# Patient Record
Sex: FEMALE | Race: WHITE | Employment: STUDENT | URBAN - METROPOLITAN AREA
[De-identification: names, ages, dates, MRNs, and addresses within clinical notes are randomized per-mention and may not be internally consistent; named-entity substitution may affect disease eponyms.]

---
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
## 2021-02-09 ENCOUNTER — HOSPITAL ENCOUNTER (OUTPATIENT)
Dept: LAB | Age: 21
Discharge: HOME | End: 2021-02-09
Payer: COMMERCIAL

## 2021-02-09 LAB — HGB BLD-MCNC: 9.5 G/DL (ref 11.7–15.4)

## 2021-02-09 PROCEDURE — 36415 COLL VENOUS BLD VENIPUNCTURE: CPT

## 2021-02-09 PROCEDURE — 85018 HEMOGLOBIN: CPT

## 2021-02-10 ENCOUNTER — ANESTHESIA EVENT (OUTPATIENT)
Dept: SURGERY | Age: 21
End: 2021-02-10
Payer: COMMERCIAL

## 2021-02-11 ENCOUNTER — ANESTHESIA (OUTPATIENT)
Dept: SURGERY | Age: 21
End: 2021-02-11
Payer: COMMERCIAL

## 2021-02-11 ENCOUNTER — HOSPITAL ENCOUNTER (OUTPATIENT)
Age: 21
Setting detail: OUTPATIENT SURGERY
Discharge: HOME | End: 2021-02-11
Attending: ORTHOPAEDIC SURGERY | Admitting: ORTHOPAEDIC SURGERY
Payer: COMMERCIAL

## 2021-02-11 ENCOUNTER — APPOINTMENT (OUTPATIENT)
Dept: GENERAL RADIOLOGY | Age: 21
End: 2021-02-11
Attending: ORTHOPAEDIC SURGERY
Payer: COMMERCIAL

## 2021-02-11 VITALS
HEART RATE: 89 BPM | RESPIRATION RATE: 20 BRPM | OXYGEN SATURATION: 99 % | SYSTOLIC BLOOD PRESSURE: 112 MMHG | TEMPERATURE: 97.6 F | WEIGHT: 125 LBS | DIASTOLIC BLOOD PRESSURE: 60 MMHG | BODY MASS INDEX: 19.58 KG/M2

## 2021-02-11 DIAGNOSIS — T85.848D PAIN FROM IMPLANTED HARDWARE, SUBSEQUENT ENCOUNTER: Primary | ICD-10-CM

## 2021-02-11 LAB — HCG UR QL: NEGATIVE

## 2021-02-11 PROCEDURE — 77030002933 HC SUT MCRYL J&J -A: Performed by: ORTHOPAEDIC SURGERY

## 2021-02-11 PROCEDURE — 77030008984 HC BN CANC CHP MUSC -E: Performed by: ORTHOPAEDIC SURGERY

## 2021-02-11 PROCEDURE — 76010010054 HC POST OP PAIN BLOCK: Performed by: ORTHOPAEDIC SURGERY

## 2021-02-11 PROCEDURE — 74011000250 HC RX REV CODE- 250: Performed by: ANESTHESIOLOGY

## 2021-02-11 PROCEDURE — 76010000161 HC OR TIME 1 TO 1.5 HR INTENSV-TIER 1: Performed by: ORTHOPAEDIC SURGERY

## 2021-02-11 PROCEDURE — 2709999900 HC NON-CHARGEABLE SUPPLY: Performed by: ORTHOPAEDIC SURGERY

## 2021-02-11 PROCEDURE — 74011250636 HC RX REV CODE- 250/636: Performed by: ANESTHESIOLOGY

## 2021-02-11 PROCEDURE — 77030003602 HC NDL NRV BLK BBMI -B: Performed by: ANESTHESIOLOGY

## 2021-02-11 PROCEDURE — 74011000250 HC RX REV CODE- 250: Performed by: NURSE ANESTHETIST, CERTIFIED REGISTERED

## 2021-02-11 PROCEDURE — 76060000033 HC ANESTHESIA 1 TO 1.5 HR: Performed by: ORTHOPAEDIC SURGERY

## 2021-02-11 PROCEDURE — 74011250636 HC RX REV CODE- 250/636: Performed by: ORTHOPAEDIC SURGERY

## 2021-02-11 PROCEDURE — 76210000006 HC OR PH I REC 0.5 TO 1 HR: Performed by: ORTHOPAEDIC SURGERY

## 2021-02-11 PROCEDURE — 76210000020 HC REC RM PH II FIRST 0.5 HR: Performed by: ORTHOPAEDIC SURGERY

## 2021-02-11 PROCEDURE — 74011250637 HC RX REV CODE- 250/637: Performed by: ANESTHESIOLOGY

## 2021-02-11 PROCEDURE — 76942 ECHO GUIDE FOR BIOPSY: CPT | Performed by: ORTHOPAEDIC SURGERY

## 2021-02-11 PROCEDURE — 77030038911 HC SYS ANGEL BN MARRW ARTH -G1: Performed by: ORTHOPAEDIC SURGERY

## 2021-02-11 PROCEDURE — 74011250636 HC RX REV CODE- 250/636: Performed by: NURSE ANESTHETIST, CERTIFIED REGISTERED

## 2021-02-11 PROCEDURE — 77030000032 HC CUF TRNQT ZIMM -B: Performed by: ORTHOPAEDIC SURGERY

## 2021-02-11 PROCEDURE — 81025 URINE PREGNANCY TEST: CPT

## 2021-02-11 DEVICE — GRAFT BNE SUB 30CC 1.7-10MM CANC CHIP MORSELIZED FRZ DRY: Type: IMPLANTABLE DEVICE | Site: FOOT | Status: FUNCTIONAL

## 2021-02-11 RX ORDER — PROPOFOL 10 MG/ML
INJECTION, EMULSION INTRAVENOUS AS NEEDED
Status: DISCONTINUED | OUTPATIENT
Start: 2021-02-11 | End: 2021-02-11 | Stop reason: HOSPADM

## 2021-02-11 RX ORDER — FENTANYL CITRATE 50 UG/ML
100 INJECTION, SOLUTION INTRAMUSCULAR; INTRAVENOUS ONCE
Status: COMPLETED | OUTPATIENT
Start: 2021-02-11 | End: 2021-02-11

## 2021-02-11 RX ORDER — SODIUM CHLORIDE 0.9 % (FLUSH) 0.9 %
5-40 SYRINGE (ML) INJECTION EVERY 8 HOURS
Status: DISCONTINUED | OUTPATIENT
Start: 2021-02-11 | End: 2021-02-11 | Stop reason: HOSPADM

## 2021-02-11 RX ORDER — PROPOFOL 10 MG/ML
INJECTION, EMULSION INTRAVENOUS
Status: DISCONTINUED | OUTPATIENT
Start: 2021-02-11 | End: 2021-02-11 | Stop reason: HOSPADM

## 2021-02-11 RX ORDER — DEXAMETHASONE SODIUM PHOSPHATE 4 MG/ML
INJECTION, SOLUTION INTRA-ARTICULAR; INTRALESIONAL; INTRAMUSCULAR; INTRAVENOUS; SOFT TISSUE
Status: COMPLETED | OUTPATIENT
Start: 2021-02-11 | End: 2021-02-11

## 2021-02-11 RX ORDER — HEPARIN SODIUM 1000 [USP'U]/ML
1000 INJECTION, SOLUTION INTRAVENOUS; SUBCUTANEOUS ONCE
Status: DISCONTINUED | OUTPATIENT
Start: 2021-02-11 | End: 2021-02-11 | Stop reason: HOSPADM

## 2021-02-11 RX ORDER — SODIUM CHLORIDE 9 MG/ML
50 INJECTION, SOLUTION INTRAVENOUS CONTINUOUS
Status: DISCONTINUED | OUTPATIENT
Start: 2021-02-11 | End: 2021-02-11 | Stop reason: HOSPADM

## 2021-02-11 RX ORDER — LIDOCAINE HYDROCHLORIDE 10 MG/ML
0.1 INJECTION INFILTRATION; PERINEURAL AS NEEDED
Status: DISCONTINUED | OUTPATIENT
Start: 2021-02-11 | End: 2021-02-11 | Stop reason: HOSPADM

## 2021-02-11 RX ORDER — ACETAMINOPHEN 500 MG
1000 TABLET ORAL ONCE
Status: COMPLETED | OUTPATIENT
Start: 2021-02-11 | End: 2021-02-11

## 2021-02-11 RX ORDER — LIDOCAINE HYDROCHLORIDE 20 MG/ML
INJECTION, SOLUTION EPIDURAL; INFILTRATION; INTRACAUDAL; PERINEURAL AS NEEDED
Status: DISCONTINUED | OUTPATIENT
Start: 2021-02-11 | End: 2021-02-11 | Stop reason: HOSPADM

## 2021-02-11 RX ORDER — SODIUM CHLORIDE, SODIUM LACTATE, POTASSIUM CHLORIDE, CALCIUM CHLORIDE 600; 310; 30; 20 MG/100ML; MG/100ML; MG/100ML; MG/100ML
150 INJECTION, SOLUTION INTRAVENOUS CONTINUOUS
Status: DISCONTINUED | OUTPATIENT
Start: 2021-02-11 | End: 2021-02-11 | Stop reason: HOSPADM

## 2021-02-11 RX ORDER — HYDROMORPHONE HYDROCHLORIDE 2 MG/ML
0.5 INJECTION, SOLUTION INTRAMUSCULAR; INTRAVENOUS; SUBCUTANEOUS
Status: DISCONTINUED | OUTPATIENT
Start: 2021-02-11 | End: 2021-02-11 | Stop reason: HOSPADM

## 2021-02-11 RX ORDER — ACETAMINOPHEN 500 MG
1000 TABLET ORAL
Status: DISCONTINUED | OUTPATIENT
Start: 2021-02-11 | End: 2021-02-11 | Stop reason: HOSPADM

## 2021-02-11 RX ORDER — MIDAZOLAM HYDROCHLORIDE 1 MG/ML
2 INJECTION, SOLUTION INTRAMUSCULAR; INTRAVENOUS
Status: COMPLETED | OUTPATIENT
Start: 2021-02-11 | End: 2021-02-11

## 2021-02-11 RX ORDER — CEFAZOLIN SODIUM/WATER 2 G/20 ML
2 SYRINGE (ML) INTRAVENOUS ONCE
Status: COMPLETED | OUTPATIENT
Start: 2021-02-11 | End: 2021-02-11

## 2021-02-11 RX ORDER — HYDROCODONE BITARTRATE AND ACETAMINOPHEN 5; 325 MG/1; MG/1
1 TABLET ORAL AS NEEDED
Status: DISCONTINUED | OUTPATIENT
Start: 2021-02-11 | End: 2021-02-11 | Stop reason: HOSPADM

## 2021-02-11 RX ORDER — SODIUM CHLORIDE 0.9 % (FLUSH) 0.9 %
5-40 SYRINGE (ML) INJECTION AS NEEDED
Status: DISCONTINUED | OUTPATIENT
Start: 2021-02-11 | End: 2021-02-11 | Stop reason: HOSPADM

## 2021-02-11 RX ORDER — FAMOTIDINE 20 MG/1
20 TABLET, FILM COATED ORAL ONCE
Status: COMPLETED | OUTPATIENT
Start: 2021-02-11 | End: 2021-02-11

## 2021-02-11 RX ADMIN — PROPOFOL 180 MCG/KG/MIN: 10 INJECTION, EMULSION INTRAVENOUS at 10:57

## 2021-02-11 RX ADMIN — DEXAMETHASONE SODIUM PHOSPHATE 4 MG: 4 INJECTION, SOLUTION INTRAMUSCULAR; INTRAVENOUS at 08:52

## 2021-02-11 RX ADMIN — FAMOTIDINE 20 MG: 20 TABLET, FILM COATED ORAL at 08:46

## 2021-02-11 RX ADMIN — HYDROCODONE BITARTRATE AND ACETAMINOPHEN 1 TABLET: 5; 325 TABLET ORAL at 13:07

## 2021-02-11 RX ADMIN — SODIUM CHLORIDE, SODIUM LACTATE, POTASSIUM CHLORIDE, AND CALCIUM CHLORIDE 150 ML/HR: 600; 310; 30; 20 INJECTION, SOLUTION INTRAVENOUS at 08:30

## 2021-02-11 RX ADMIN — MIDAZOLAM 2 MG: 1 INJECTION INTRAMUSCULAR; INTRAVENOUS at 08:44

## 2021-02-11 RX ADMIN — ROPIVACAINE HYDROCHLORIDE 20 ML: 5 INJECTION, SOLUTION EPIDURAL; INFILTRATION; PERINEURAL at 08:52

## 2021-02-11 RX ADMIN — ACETAMINOPHEN 1000 MG: 500 TABLET, FILM COATED ORAL at 08:46

## 2021-02-11 RX ADMIN — DEXAMETHASONE SODIUM PHOSPHATE 4 MG: 4 INJECTION, SOLUTION INTRAMUSCULAR; INTRAVENOUS at 08:48

## 2021-02-11 RX ADMIN — LIDOCAINE HYDROCHLORIDE 40 MG: 20 INJECTION, SOLUTION EPIDURAL; INFILTRATION; INTRACAUDAL; PERINEURAL at 10:57

## 2021-02-11 RX ADMIN — PROPOFOL 50 MG: 10 INJECTION, EMULSION INTRAVENOUS at 10:57

## 2021-02-11 RX ADMIN — Medication 2 G: at 10:52

## 2021-02-11 RX ADMIN — FENTANYL CITRATE 100 MCG: 50 INJECTION, SOLUTION INTRAMUSCULAR; INTRAVENOUS at 08:44

## 2021-02-11 RX ADMIN — ROPIVACAINE HYDROCHLORIDE 30 ML: 5 INJECTION, SOLUTION EPIDURAL; INFILTRATION; PERINEURAL at 08:48

## 2021-02-11 NOTE — DISCHARGE INSTRUCTIONS
INSTRUCTIONS FOLLOWING FOOT SURGERY    ACTIVITY  Elevate foot (feet) for 48 hours. Use crutches as directed by your doctor. No weight bearing on operative foot. Aspirin 325 mg po every day Patient Education   Patient Education        Learning About FYCVI-80 and Social Distancing  What is it? Social distancing means putting space between yourself and other people. The recommended distance is 6 feet, or about 2 meters. This also means staying away from any place where people may gather, such as goins or other public gathering places. Why is it important? Social distancing is the best way to reduce the spread of COVID-19. This virus seems to spread from person to person through droplets from coughing and sneezing. So if you keep your distance from others, you're less likely to get it or spread it. And social distancing is important for everyone, not just those who are at high risk of infection, like older people. You might have the virus but not have symptoms. You could then give the infection to someone you come into contact with. How is it done? Putting 6 feet, or about 2 meters, between you and other people is the recommended distance. Also stay away from any place where people may gather, such as goins or other public gathering places. So if possible:  · Work from home, and keep your kids at home. · Don't travel if you don't have to. And avoid public transportation, ride-shares, and taxis unless you have no choice. · Limit shopping to essentials, like food and medicines. · Wear a cloth face cover if you have to go to a public place like the grocery store or pharmacy. · Don't eat in restaurants. (You can still get takeout or food deliveries.)  · Avoid crowds and busy places. Follow stay-at-home orders or other directions for your area. Where can you learn more?   Go to http://www.gray.com/  Enter A133 in the search box to learn more about \"Learning About COVID-19 and Social Distancing. \"  Current as of: December 18, 2020               Content Version: 12.7  © 2006-2021 [employer]. Care instructions adapted under license by [employer] (which disclaims liability or warranty for this information). If you have questions about a medical condition or this instruction, always ask your healthcare professional. [place] [address] any warranty or liability for your use of this information. Learning About How to Use Crutches  Your Care Instructions  Crutches can help you walk when you have an injured hip, leg, knee, ankle, or foot. Your doctor will tell you how much weight--if any--you can put on your leg. Be sure your crutches fit you. When you stand up in your normal posture, there should be space for two or three fingers between the top of the crutch and your armpit. When you let your hands hang down, the hand  should be at your wrists. When you put your hands on the hand , your elbows should be slightly bent. To stay safe when using crutches:  · Look straight ahead, not down at your feet. · Clear away small rugs, cords, or anything else that could cause you to trip, slip, or fall. · Be very careful around pets and small children. They can get in your path when you least expect it. · Be sure the rubber tips on your crutches are clean and in good condition to help prevent slipping. · Avoid slick conditions, such as wet floors and snowy or icy driveways. In bad weather, be especially careful on curbs and steps. How to use crutches  Getting ready to walk   1. Bend your elbows slightly. Press the padded top parts of the crutches against your sides, under your armpits. 2. If you have been told not to put any weight on your injured leg, keep that leg bent and off the ground. How to walk with crutches when you can put weight on the injured leg   Crutches allow you to take all the weight off of one leg.  They can also be used as an added support if you have some injury or condition of both legs. Here's how to walk with crutches when you're able to put weight on your weak or injured leg. Be sure your crutches fit you. · When you stand up in your normal posture, there should be space for two or three fingers between the top of the crutch and your underarm. · When you let your hands hang down, the hand  should be at your wrists. · When you put your hands on the hand , your elbows should be slightly bent. 1. Put both crutches about 12 inches in front of you. 2. Put your weight on the handgrips, not on the pads under your arms. 3. Move your weak or injured leg forward so it's almost even with the crutches. 4. Bring your good leg up, so it's even with your weak or injured leg. 5. Move your crutches about 12 inches in front of you, and start the next step. The crutches and your feet should form a triangle. Hold the crutches close enough to your body so you can push straight down on them, but leave room between the crutches for your body to pass through. Don't lean forward to reach farther. Constant pressure against your underarms can cause numbness. When you're confident using the crutches, you can move the crutches and your injured leg at the same time. Then push straight down on the crutches as you step past the crutches with your strong leg, as you would in normal walking. Take small steps. Use ramps and elevators when you can. Sitting down   1. To sit, back up to the chair. Use one hand to hold both crutches by the handgrips, beside your injured leg. With the other hand, hold onto the seat and slowly lower yourself onto the chair. 2. Lay the crutches on the ground near your chair. If you prop them up, they may fall over. Getting up from a chair   1. To get up from a chair,  the crutches and put them in one hand beside your injured leg.   2. Put your weight on the handgrips of the crutches and on your strong leg to stand up. How to go up and down stairs using crutches   Here's how to go up and down stairs using crutches. Try this first with another person nearby to steady you if needed. 1. Stand near the edge of the stairs. 2. Go up or down the stairs. · If you are going up, step up with your stronger leg. Then bring the crutches and your weak or injured leg to the upper step. · If you are going down, put your crutches and your weak or injured leg on the lower step. Then bring your stronger leg down to the lower step. Remember \"up with the good, and down with the bad\" to help you lead with the correct leg. Crutches: How to go up and down stairs with handrails   If the stairs have a good sturdy handrail, you can hold it with one hand and your crutches together in the other hand. Here's how. Try this first with another person nearby to steady you if needed. If the stairs have a handrail but you don't think it's sturdy enough, use the crutches normally, holding one in each hand. 1. Stand near the edge of the stairs. 2. Put both crutches under the arm opposite the handrail. 3. Use the hand opposite the handrail to hold both crutches by the handgrips. 4. Hold onto the handrail as you go up or down. 5. Go up or down the stairs. · If you are going up, step up with your stronger leg. Then bring the crutches and your weak or injured leg to the upper step. · If you are going down, put your crutches and your weak or injured leg on the lower step. Then bring your stronger leg down to the lower step. Remember \"up with the good, down with the bad\" to help you lead with the correct leg. Follow-up care is a key part of your treatment and safety. Be sure to make and go to all appointments, and call your doctor if you are having problems. It's also a good idea to know your test results and keep a list of the medicines you take. Where can you learn more?   Go to http://www.gray.com/  Enter H378 in the search box to learn more about \"Learning About How to Use Crutches. \"  Current as of: March 2, 2020               Content Version: 12.6  © 6978-3458 "[employer]. Care instructions adapted under license by [employer] (which disclaims liability or warranty for this information). If you have questions about a medical condition or this instruction, always ask your healthcare professional. [place] [address] any warranty or liability for your use of this information. DIET  Clear liquids until no nausea or vomiting; then light diet for the first day. Advance to regular diet on second day, unless your doctor orders otherwise. PAIN  Take pain medications as directed by your doctor. Call your doctor if pain is NOT relieved by medication. DO NOT take aspirin or blood thinners until directed by your doctor. DRESSING CARE      FOLLOW-UP PHONE CALLS  Calls will be made by nursing staff. If you have any problems, call your doctor as needed. CALL YOUR DOCTOR IF YOU HAVE  Excessive bleeding that does not stop after holding mild pressure over the area. Temperature of 101 degrees or above. Redness, excessive swelling or bruising, and/or green or yellow, smelly discharge from incision. Loss of sensation - cold, white or blue toes. AFTER ANESTHESIA  For the first 24 hours and while taking narcotics for pain: DO NOT Drive, Drink Alcoholic beverages, or make important Decisions. Be aware of dizziness following anesthesia and while taking pain medication.     OTHER INSTRUCTIONS    APPOINTMENT DATE/TIME_________________________________    Ibeth Sosa DOCTOR'S PHONE NUMBER__________________________

## 2021-02-11 NOTE — BRIEF OP NOTE
Brief Postoperative Note    Patient: Christie Phoenix  YOB: 2000  MRN: [id number]    Date of Procedure: 2/11/2021     Pre-Op Diagnosis: Other mechanical complication of internal orthopedic device, unspecified orthopedic device type, initial encounter [facility]) [D26.096S]      Post-Op Diagnosis: Same as preoperative diagnosis. Procedure(s):  RIGHT FOOT HARDWARE REMOVAL/ PERONEAL TENOSYNOVECTOMY/ CALCANEAL BONE MARROW ASPIRATE CHOICE ANES    Surgeon(s):  Dani Asher MD    Surgical Assistant: None    Anesthesia: General     Estimated Blood Loss (mL): Minimal    Complications: None    Specimens: * No specimens in log *     Implants:   Implant Name Type Inv.  Item Serial No.  Lot No. LRB No. Used Action   BONE CHP CANC FD 1.7-10MM 30ML --  - [id number]  BONE CHP CANC FD 1.7-10MM 30ML --  [id number] [employer]  Right 1 Implanted       Drains: * No LDAs found *    Findings:     Electronically Signed by Johanny Pena MD on 2/11/2021 at 2:20 PM

## 2021-02-11 NOTE — ANESTHESIA PROCEDURE NOTES
Peripheral Block    Start time: 2/11/2021 8:44 AM  End time: 2/11/2021 8:48 AM  Performed by: Los Espinal MD  Authorized by: Los Espinal MD       Pre-procedure: Indications: at surgeon's request and post-op pain management    Preanesthetic Checklist: patient identified, risks and benefits discussed, site marked, timeout performed, anesthesia consent given and patient being monitored    Timeout Time: 08:44          Block Type:   Block Type:  Popliteal  Laterality:  Right  Monitoring:  Standard ASA monitoring, responsive to questions, oxygen, continuous pulse ox, frequent vital sign checks and heart rate  Injection Technique:  Single shot  Procedures: ultrasound guided and nerve stimulator    Patient Position: left lateral decubitus (lateral decubitus)  Prep: chlorhexidine    Location:  Mid thigh  Needle Type:  Stimuplex  Needle Gauge:  22 G  Needle Localization:  Nerve stimulator and ultrasound guidance  Motor Response comment:   Motor Response: minimal motor response >0.4 mA   Medication Injected:  Ropivacaine 0.375% with epi 1:200,000 in NS injection, 30 mL (Mixture components: ropivacaine (PF) 5 mg/mL (0.5 %) Soln, . 75 mL; EPINEPHrine HCl (PF) 1 mg/mL (1 mL) Soln, . 005 mL; 0.9% sodium chloride Soln, .245 mL)  dexamethasone (DECADRON) 4 mg/mL injection, 4 mg  Med Admin Time: 2/11/2021 8:48 AM    Assessment:  Number of attempts:  1  Injection Assessment:  Incremental injection every 5 mL, local visualized surrounding nerve on ultrasound, negative aspiration for blood, no intravascular symptoms, no paresthesia and ultrasound image on chart  Patient tolerance:  Patient tolerated the procedure well with no immediate complications  All needles out intact, proc. Tolerated well.

## 2021-02-11 NOTE — ANESTHESIA PROCEDURE NOTES
Peripheral Block    Start time: 2/11/2021 8:49 AM  End time: 2/11/2021 8:52 AM  Performed by: Danna Hsu MD  Authorized by: Danna Hsu MD       Pre-procedure: Indications: at surgeon's request and post-op pain management    Preanesthetic Checklist: patient identified, risks and benefits discussed, site marked, timeout performed, anesthesia consent given and patient being monitored    Timeout Time: 08:49          Block Type:   Block Type: Adductor canal  Laterality:  Right  Monitoring:  Standard ASA monitoring, responsive to questions, oxygen, continuous pulse ox, frequent vital sign checks and heart rate  Injection Technique:  Single shot  Procedures: ultrasound guided    Patient Position: supine  Prep: chlorhexidine    Location:  Mid thigh  Needle Type:  Stimuplex  Needle Gauge:  22 G  Needle Localization:  Ultrasound guidance  Medication Injected:  Ropivacaine 0.5% with epinephrine 1:200,000 injection, 20 mL (Mixture components: EPINEPHrine HCl (PF) 1 mg/mL (1 mL) Soln, . 005 mL; ropivacaine (PF) 5 mg/mL (0.5 %) Soln, 1 mL)  dexamethasone (DECADRON) 4 mg/mL injection, 4 mg  Med Admin Time: 2/11/2021 8:52 AM    Assessment:  Number of attempts:  1  Injection Assessment:  Incremental injection every 5 mL, local visualized surrounding nerve on ultrasound, negative aspiration for blood, no intravascular symptoms, no paresthesia and ultrasound image on chart  Patient tolerance:  Patient tolerated the procedure well with no immediate complications  All needles out intact, proc. Tolerated well.

## 2021-02-11 NOTE — OP NOTES
FULL OP NOTE    PATIENT NAME: Kevin Jacob  MRN: [id number]    DATE OF SURGERY: 2/11/2021    PREOPERATIVE DIAGNOSIS: Other mechanical complication of internal orthopedic device, unspecified orthopedic device type, initial encounter [facility]) [V52.092B]        POSTOPERATIVE DIAGNOSIS: Other mechanical complication of internal orthopedic device, unspecified orthopedic device type, initial encounter ([facility] [address] [M21.091K]        PROCEDURE: 1. Right foot hardware removal, 20680                             2.  Right peroneal Tenolysis, 09727                             3.  Right sural nerve neurolysis, 97124                            2.  Right lateral calcaneal bone marrow autograft harvest, 20900      SURGEON: Dwayne Rinaldi MD    HARDWARE:   Implant Name Type Inv. Item Serial No.  Lot No. LRB No. Used Action   BONE CHP CANC FD 1.7-10MM 30ML --  - [id number]  BONE CHP CANC FD 1.7-10MM 30ML --  [id number] [employer]_  Right 1 Implanted     INDICATIONS: This patient is a 21y.o. year old female with a history of Other mechanical complication of internal orthopedic device, unspecified orthopedic device type, initial encounter ([facility] [address] [H24.850R] with a history of an Jane lateral column lengthening procedure done who has pain over peroneal tendons and sural neuritis. Additionally she has some impingement of the wedge and her subtalar joint laterally causing sinus Tarsi pain   who has failed conservative therapy and desires surgical treatment. Risks and benefits of the procedure including, but not limited to, anesthetic complications as well as surgical complications including damage to nerves and blood vessels, risk of infection, risk of incomplete pain relief, risk of malunion, nonunion and need for additional surgery have been discussed with the patient who wishes to proceed. PROCEDURE IN DETAIL: A time out was done to confirm the operating procedure, surgeon, patient and site. A block was placed by the department of anesthesia. In a preop surgical timeout the right lower extremity was then a 5 tract surgical site and prepped and draped in a standard sterile fashion using 70% alcohol solution. A large bore needle was placed lateral calcaneal wall where bone marrow aspirate was obtained. This was later spun in a centrifuge and mixed with allograft and placed in the defect from the hardware removal.  Patient's previous lateral approach to the calcaneus was an open. The sural nerve was identified. A neurolysis of this was performed at that time to free the sural nerve from scar tissue and the nerve was carefully protected out the procedure. Both peroneal tendons were identified and a tenolysis of both peroneal tendons was also performed that time to mobilize them from scar tissue in the area. The underlying Jane metal wedge was identified. An osteotome was used on both sides a wedge to remove the wedge in its entirety. Disc rated a large defect in the calcaneus as was anticipated. This was packed with allograft chips soaked in the patient's bone marrow aspirate and confirmed to be adequately placed on image intensification. Wound was irrigated and closed using Monocryl nylon sutures. A sterile dressings and applied followed by well-padded posterior splint. Anesthesia was discontinued. Patient was transfer better recovery bed. She was taken recovery in satisfactory condition. She appeared to tolerate procedure well. There were no apparent or lower anesthetic complications. All needle and sponge counts are correct. Postop should be nonweightbearing in splint taking aspirin 325 mg daily for DVT prophylaxis. TOURNIQUET TIME: Approx 54 minutes. SPECIMENS: none    ESTIMATED BLOOD LOSS: min mL.

## 2021-02-11 NOTE — ANESTHESIA PREPROCEDURE EVALUATION
Anesthetic History     PONV          Review of Systems / Medical History  Patient summary reviewed and pertinent labs reviewed    Pulmonary  Within defined limits                 Neuro/Psych         Psychiatric history (Depression)     Cardiovascular                  Exercise tolerance: >4 METS  Comments: Denies CP, SOB or changes in functional status   GI/Hepatic/Renal  Within defined limits              Endo/Other  Within defined limits           Other Findings   Comments: CRPS LE           Physical Exam    Airway  Mallampati: II  TM Distance: 4 - 6 cm  Neck ROM: normal range of motion   Mouth opening: Normal     Cardiovascular    Rhythm: regular  Rate: normal         Dental  No notable dental hx       Pulmonary  Breath sounds clear to auscultation               Abdominal  GI exam deferred       Other Findings            Anesthetic Plan    ASA: 2  Anesthesia type: total IV anesthesia and general - backup      Post-op pain plan if not by surgeon: peripheral nerve block single    Induction: Intravenous  Anesthetic plan and risks discussed with: Patient and Spouse

## 2021-02-11 NOTE — ANESTHESIA POSTPROCEDURE EVALUATION
Procedure(s):  RIGHT FOOT HARDWARE REMOVAL/ PERONEAL TENOSYNOVECTOMY/ CALCANEAL BONE MARROW ASPIRATE CHOICE ANES. total IV anesthesia, general - backup    Anesthesia Post Evaluation      Multimodal analgesia: multimodal analgesia used between 6 hours prior to anesthesia start to PACU discharge  Patient location during evaluation: bedside  Patient participation: complete - patient participated  Level of consciousness: awake and alert  Pain management: adequate  Airway patency: patent  Anesthetic complications: no  Cardiovascular status: hemodynamically stable  Respiratory status: spontaneous ventilation  Hydration status: euvolemic  Comments: Patient stable and may discharge at this time. Post anesthesia nausea and vomiting:  none  Final Post Anesthesia Temperature Assessment:  Normothermia (36.0-37.5 degrees C)    Good result with popliteal and adductor canal blocks.     INITIAL Post-op Vital signs:   Vitals Value Taken Time   /63 02/11/21 1253   Temp 36.4 °C (97.6 °F) 02/11/21 1253   Pulse 83 02/11/21 1253   Resp 16 02/11/21 1253   SpO2 100 % 02/11/21 1253

## 2021-02-19 RX ORDER — OXYCODONE HYDROCHLORIDE 5 MG/1
10 TABLET ORAL
Qty: 30 TAB | Refills: 0 | Status: SHIPPED | OUTPATIENT
Start: 2021-02-19 | End: 2021-02-22

## 2021-05-13 ENCOUNTER — HOSPITAL ENCOUNTER (OUTPATIENT)
Dept: CT IMAGING | Age: 21
Discharge: HOME | End: 2021-05-13
Attending: ORTHOPAEDIC SURGERY
Payer: COMMERCIAL

## 2021-05-13 DIAGNOSIS — T84.498A OTHER MECHANICAL COMPLICATION OF OTHER INTERNAL ORTHOPEDIC DEVICES, IMPLANTS AND GRAFTS, INITIAL ENCOUNTER (HCC): ICD-10-CM

## 2021-05-13 PROCEDURE — 73700 CT LOWER EXTREMITY W/O DYE: CPT

## 2021-05-17 ENCOUNTER — HOSPITAL ENCOUNTER (OUTPATIENT)
Dept: LAB | Age: 21
Discharge: HOME | End: 2021-05-17
Attending: ORTHOPAEDIC SURGERY
Payer: COMMERCIAL

## 2021-05-17 DIAGNOSIS — Z98.890 S/P FOOT SURGERY, LEFT: Chronic | ICD-10-CM

## 2021-05-17 LAB
BASOPHILS # BLD: 0.1 K/UL (ref 0–0.2)
BASOPHILS NFR BLD: 1 % (ref 0–2)
CRP SERPL-MCNC: <0.3 MG/DL (ref 0–0.9)
DIFFERENTIAL METHOD BLD: ABNORMAL
EOSINOPHIL # BLD: 0.1 K/UL (ref 0–0.8)
EOSINOPHIL NFR BLD: 1 % (ref 0.5–7.8)
ERYTHROCYTE [DISTWIDTH] IN BLOOD BY AUTOMATED COUNT: 15.5 % (ref 11.9–14.6)
ERYTHROCYTE [SEDIMENTATION RATE] IN BLOOD: 14 MM/HR (ref 0–20)
HCT VFR BLD AUTO: 32.2 % (ref 35.8–46.3)
HGB BLD-MCNC: 8.9 G/DL (ref 11.7–15.4)
IMM GRANULOCYTES # BLD AUTO: 0 K/UL (ref 0–0.5)
IMM GRANULOCYTES NFR BLD AUTO: 1 % (ref 0–5)
LYMPHOCYTES # BLD: 2 K/UL (ref 0.5–4.6)
LYMPHOCYTES NFR BLD: 23 % (ref 13–44)
MCH RBC QN AUTO: 20.1 PG (ref 26.1–32.9)
MCHC RBC AUTO-ENTMCNC: 27.6 G/DL (ref 31.4–35)
MCV RBC AUTO: 72.9 FL (ref 79.6–97.8)
MONOCYTES # BLD: 0.7 K/UL (ref 0.1–1.3)
MONOCYTES NFR BLD: 8 % (ref 4–12)
NEUTS SEG # BLD: 5.8 K/UL (ref 1.7–8.2)
NEUTS SEG NFR BLD: 66 % (ref 43–78)
NRBC # BLD: 0 K/UL (ref 0–0.2)
PLATELET # BLD AUTO: 303 K/UL (ref 150–450)
PMV BLD AUTO: 10 FL (ref 9.4–12.3)
RBC # BLD AUTO: 4.42 M/UL (ref 4.05–5.2)
WBC # BLD AUTO: 8.8 K/UL (ref 4.3–11.1)

## 2021-05-17 PROCEDURE — 86140 C-REACTIVE PROTEIN: CPT

## 2021-05-17 PROCEDURE — 36415 COLL VENOUS BLD VENIPUNCTURE: CPT

## 2021-05-17 PROCEDURE — 85025 COMPLETE CBC W/AUTO DIFF WBC: CPT

## 2021-05-17 PROCEDURE — 85652 RBC SED RATE AUTOMATED: CPT

## 2021-07-07 ENCOUNTER — ANESTHESIA EVENT (OUTPATIENT)
Dept: SURGERY | Age: 21
End: 2021-07-07
Payer: COMMERCIAL

## 2021-07-08 ENCOUNTER — HOSPITAL ENCOUNTER (OUTPATIENT)
Age: 21
Setting detail: OBSERVATION
LOS: 1 days | Discharge: HOME | End: 2021-07-11
Attending: ORTHOPAEDIC SURGERY | Admitting: ORTHOPAEDIC SURGERY
Payer: COMMERCIAL

## 2021-07-08 ENCOUNTER — APPOINTMENT (OUTPATIENT)
Dept: GENERAL RADIOLOGY | Age: 21
End: 2021-07-08
Attending: ORTHOPAEDIC SURGERY
Payer: COMMERCIAL

## 2021-07-08 ENCOUNTER — ANESTHESIA (OUTPATIENT)
Dept: SURGERY | Age: 21
End: 2021-07-08
Payer: COMMERCIAL

## 2021-07-08 DIAGNOSIS — M19.071 ARTHRITIS OF MIDTARSAL JOINT OF RIGHT FOOT: Primary | ICD-10-CM

## 2021-07-08 LAB — HCG UR QL: NEGATIVE

## 2021-07-08 PROCEDURE — 76010010054 HC POST OP PAIN BLOCK: Performed by: ORTHOPAEDIC SURGERY

## 2021-07-08 PROCEDURE — 28320 REPAIR OF FOOT BONES: CPT | Performed by: NURSE PRACTITIONER

## 2021-07-08 PROCEDURE — 74011250637 HC RX REV CODE- 250/637: Performed by: NURSE PRACTITIONER

## 2021-07-08 PROCEDURE — 99218 HC RM OBSERVATION: CPT

## 2021-07-08 PROCEDURE — 77030002933 HC SUT MCRYL J&J -A: Performed by: ORTHOPAEDIC SURGERY

## 2021-07-08 PROCEDURE — 74011250636 HC RX REV CODE- 250/636: Performed by: NURSE PRACTITIONER

## 2021-07-08 PROCEDURE — 77030025281 HC SPLNT ORTHGLS 1 BSNM -B: Performed by: ORTHOPAEDIC SURGERY

## 2021-07-08 PROCEDURE — 76010000161 HC OR TIME 1 TO 1.5 HR INTENSV-TIER 1: Performed by: ORTHOPAEDIC SURGERY

## 2021-07-08 PROCEDURE — 74011000250 HC RX REV CODE- 250: Performed by: NURSE ANESTHETIST, CERTIFIED REGISTERED

## 2021-07-08 PROCEDURE — 77030029041: Performed by: ORTHOPAEDIC SURGERY

## 2021-07-08 PROCEDURE — 74011250637 HC RX REV CODE- 250/637: Performed by: PHYSICIAN ASSISTANT

## 2021-07-08 PROCEDURE — 77030000032 HC CUF TRNQT ZIMM -B: Performed by: ORTHOPAEDIC SURGERY

## 2021-07-08 PROCEDURE — 74011250636 HC RX REV CODE- 250/636: Performed by: ANESTHESIOLOGY

## 2021-07-08 PROCEDURE — 28320 REPAIR OF FOOT BONES: CPT | Performed by: ORTHOPAEDIC SURGERY

## 2021-07-08 PROCEDURE — 76210000001 HC OR PH I REC 2.5 TO 3 HR: Performed by: ORTHOPAEDIC SURGERY

## 2021-07-08 PROCEDURE — 2709999900 HC NON-CHARGEABLE SUPPLY: Performed by: ORTHOPAEDIC SURGERY

## 2021-07-08 PROCEDURE — 28740 FUSION OF FOOT BONES: CPT | Performed by: NURSE PRACTITIONER

## 2021-07-08 PROCEDURE — 77030027138 HC INCENT SPIROMETER -A

## 2021-07-08 PROCEDURE — 77030038911 HC SYS ANGEL BN MARRW ARTH -G1: Performed by: ORTHOPAEDIC SURGERY

## 2021-07-08 PROCEDURE — 2709999900 HC NON-CHARGEABLE SUPPLY

## 2021-07-08 PROCEDURE — 76060000033 HC ANESTHESIA 1 TO 1.5 HR: Performed by: ORTHOPAEDIC SURGERY

## 2021-07-08 PROCEDURE — 74011250636 HC RX REV CODE- 250/636: Performed by: PHYSICIAN ASSISTANT

## 2021-07-08 PROCEDURE — 76942 ECHO GUIDE FOR BIOPSY: CPT | Performed by: ORTHOPAEDIC SURGERY

## 2021-07-08 PROCEDURE — 74011000250 HC RX REV CODE- 250: Performed by: ANESTHESIOLOGY

## 2021-07-08 PROCEDURE — 74011250636 HC RX REV CODE- 250/636: Performed by: NURSE ANESTHETIST, CERTIFIED REGISTERED

## 2021-07-08 PROCEDURE — 77030002916 HC SUT ETHLN J&J -A: Performed by: ORTHOPAEDIC SURGERY

## 2021-07-08 PROCEDURE — 74011250637 HC RX REV CODE- 250/637: Performed by: ANESTHESIOLOGY

## 2021-07-08 PROCEDURE — 20900 REMOVAL OF BONE FOR GRAFT: CPT | Performed by: ORTHOPAEDIC SURGERY

## 2021-07-08 PROCEDURE — 20900 REMOVAL OF BONE FOR GRAFT: CPT | Performed by: NURSE PRACTITIONER

## 2021-07-08 PROCEDURE — 81025 URINE PREGNANCY TEST: CPT

## 2021-07-08 PROCEDURE — 28740 FUSION OF FOOT BONES: CPT | Performed by: ORTHOPAEDIC SURGERY

## 2021-07-08 PROCEDURE — C1713 ANCHOR/SCREW BN/BN,TIS/BN: HCPCS | Performed by: ORTHOPAEDIC SURGERY

## 2021-07-08 PROCEDURE — 77030003602 HC NDL NRV BLK BBMI -B: Performed by: ANESTHESIOLOGY

## 2021-07-08 DEVICE — IMPLANTABLE DEVICE: Type: IMPLANTABLE DEVICE | Site: FOOT | Status: FUNCTIONAL

## 2021-07-08 RX ORDER — ENOXAPARIN SODIUM 100 MG/ML
40 INJECTION SUBCUTANEOUS EVERY 24 HOURS
Status: DISCONTINUED | OUTPATIENT
Start: 2021-07-08 | End: 2021-07-11 | Stop reason: HOSPADM

## 2021-07-08 RX ORDER — MIDAZOLAM HYDROCHLORIDE 1 MG/ML
2 INJECTION, SOLUTION INTRAMUSCULAR; INTRAVENOUS ONCE
Status: COMPLETED | OUTPATIENT
Start: 2021-07-08 | End: 2021-07-08

## 2021-07-08 RX ORDER — OXYCODONE HYDROCHLORIDE 5 MG/1
10 TABLET ORAL
Status: DISCONTINUED | OUTPATIENT
Start: 2021-07-08 | End: 2021-07-08

## 2021-07-08 RX ORDER — FAMOTIDINE 20 MG/1
20 TABLET, FILM COATED ORAL ONCE
Status: COMPLETED | OUTPATIENT
Start: 2021-07-08 | End: 2021-07-08

## 2021-07-08 RX ORDER — ACETAMINOPHEN 325 MG/1
650 TABLET ORAL
Status: DISCONTINUED | OUTPATIENT
Start: 2021-07-08 | End: 2021-07-11 | Stop reason: HOSPADM

## 2021-07-08 RX ORDER — SODIUM CHLORIDE 0.9 % (FLUSH) 0.9 %
5-40 SYRINGE (ML) INJECTION AS NEEDED
Status: DISCONTINUED | OUTPATIENT
Start: 2021-07-08 | End: 2021-07-11 | Stop reason: HOSPADM

## 2021-07-08 RX ORDER — LIDOCAINE HYDROCHLORIDE 10 MG/ML
0.1 INJECTION INFILTRATION; PERINEURAL AS NEEDED
Status: DISCONTINUED | OUTPATIENT
Start: 2021-07-08 | End: 2021-07-11 | Stop reason: HOSPADM

## 2021-07-08 RX ORDER — PROPOFOL 10 MG/ML
INJECTION, EMULSION INTRAVENOUS AS NEEDED
Status: DISCONTINUED | OUTPATIENT
Start: 2021-07-08 | End: 2021-07-08 | Stop reason: HOSPADM

## 2021-07-08 RX ORDER — CEFAZOLIN SODIUM/WATER 2 G/20 ML
2 SYRINGE (ML) INTRAVENOUS ONCE
Status: COMPLETED | OUTPATIENT
Start: 2021-07-08 | End: 2021-07-08

## 2021-07-08 RX ORDER — BUPIVACAINE HYDROCHLORIDE AND EPINEPHRINE 5; 5 MG/ML; UG/ML
INJECTION, SOLUTION EPIDURAL; INTRACAUDAL; PERINEURAL
Status: COMPLETED | OUTPATIENT
Start: 2021-07-08 | End: 2021-07-08

## 2021-07-08 RX ORDER — FENTANYL CITRATE 50 UG/ML
100 INJECTION, SOLUTION INTRAMUSCULAR; INTRAVENOUS ONCE
Status: COMPLETED | OUTPATIENT
Start: 2021-07-08 | End: 2021-07-08

## 2021-07-08 RX ORDER — OXYCODONE HYDROCHLORIDE 5 MG/1
5 TABLET ORAL
Status: DISCONTINUED | OUTPATIENT
Start: 2021-07-08 | End: 2021-07-08

## 2021-07-08 RX ORDER — VENLAFAXINE HYDROCHLORIDE 75 MG/1
225 CAPSULE, EXTENDED RELEASE ORAL DAILY
Status: DISCONTINUED | OUTPATIENT
Start: 2021-07-09 | End: 2021-07-11 | Stop reason: HOSPADM

## 2021-07-08 RX ORDER — HYDROMORPHONE HYDROCHLORIDE 1 MG/ML
0.5 INJECTION, SOLUTION INTRAMUSCULAR; INTRAVENOUS; SUBCUTANEOUS
Status: DISCONTINUED | OUTPATIENT
Start: 2021-07-08 | End: 2021-07-08

## 2021-07-08 RX ORDER — SODIUM CHLORIDE, SODIUM LACTATE, POTASSIUM CHLORIDE, CALCIUM CHLORIDE 600; 310; 30; 20 MG/100ML; MG/100ML; MG/100ML; MG/100ML
75 INJECTION, SOLUTION INTRAVENOUS CONTINUOUS
Status: DISPENSED | OUTPATIENT
Start: 2021-07-08 | End: 2021-07-09

## 2021-07-08 RX ORDER — SODIUM CHLORIDE 0.9 % (FLUSH) 0.9 %
5-40 SYRINGE (ML) INJECTION EVERY 8 HOURS
Status: DISCONTINUED | OUTPATIENT
Start: 2021-07-08 | End: 2021-07-11 | Stop reason: HOSPADM

## 2021-07-08 RX ORDER — KETOROLAC TROMETHAMINE 15 MG/ML
15 INJECTION, SOLUTION INTRAMUSCULAR; INTRAVENOUS
Status: COMPLETED | OUTPATIENT
Start: 2021-07-08 | End: 2021-07-08

## 2021-07-08 RX ORDER — TRAZODONE HYDROCHLORIDE 50 MG/1
50 TABLET ORAL
Status: DISCONTINUED | OUTPATIENT
Start: 2021-07-08 | End: 2021-07-11 | Stop reason: HOSPADM

## 2021-07-08 RX ORDER — TRAMADOL HYDROCHLORIDE 50 MG/1
50 TABLET ORAL
Status: DISCONTINUED | OUTPATIENT
Start: 2021-07-08 | End: 2021-07-09

## 2021-07-08 RX ORDER — HYDROCODONE BITARTRATE AND ACETAMINOPHEN 5; 325 MG/1; MG/1
1 TABLET ORAL
Status: DISCONTINUED | OUTPATIENT
Start: 2021-07-08 | End: 2021-07-08

## 2021-07-08 RX ORDER — LIDOCAINE HYDROCHLORIDE 20 MG/ML
INJECTION, SOLUTION EPIDURAL; INFILTRATION; INTRACAUDAL; PERINEURAL AS NEEDED
Status: DISCONTINUED | OUTPATIENT
Start: 2021-07-08 | End: 2021-07-08 | Stop reason: HOSPADM

## 2021-07-08 RX ORDER — HYDROMORPHONE HYDROCHLORIDE 1 MG/ML
1 INJECTION, SOLUTION INTRAMUSCULAR; INTRAVENOUS; SUBCUTANEOUS
Status: DISCONTINUED | OUTPATIENT
Start: 2021-07-08 | End: 2021-07-09

## 2021-07-08 RX ORDER — SODIUM CHLORIDE, SODIUM LACTATE, POTASSIUM CHLORIDE, CALCIUM CHLORIDE 600; 310; 30; 20 MG/100ML; MG/100ML; MG/100ML; MG/100ML
75 INJECTION, SOLUTION INTRAVENOUS CONTINUOUS
Status: DISCONTINUED | OUTPATIENT
Start: 2021-07-08 | End: 2021-07-11 | Stop reason: HOSPADM

## 2021-07-08 RX ADMIN — PROPOFOL 140 MG: 10 INJECTION, EMULSION INTRAVENOUS at 08:08

## 2021-07-08 RX ADMIN — SODIUM CHLORIDE, SODIUM LACTATE, POTASSIUM CHLORIDE, AND CALCIUM CHLORIDE 75 ML/HR: 600; 310; 30; 20 INJECTION, SOLUTION INTRAVENOUS at 06:45

## 2021-07-08 RX ADMIN — PROPOFOL 50 MG: 10 INJECTION, EMULSION INTRAVENOUS at 08:07

## 2021-07-08 RX ADMIN — BUPIVACAINE HYDROCHLORIDE AND EPINEPHRINE BITARTRATE 30 ML: 5; .005 INJECTION, SOLUTION EPIDURAL; INTRACAUDAL; PERINEURAL at 08:02

## 2021-07-08 RX ADMIN — CEFAZOLIN 2 G: 1 INJECTION, POWDER, FOR SOLUTION INTRAVENOUS at 08:07

## 2021-07-08 RX ADMIN — ENOXAPARIN SODIUM 40 MG: 40 INJECTION SUBCUTANEOUS at 21:38

## 2021-07-08 RX ADMIN — Medication 5 ML: at 21:38

## 2021-07-08 RX ADMIN — BUPIVACAINE HYDROCHLORIDE AND EPINEPHRINE 20 ML: 5; 5 INJECTION, SOLUTION EPIDURAL; INTRACAUDAL; PERINEURAL at 08:03

## 2021-07-08 RX ADMIN — FENTANYL CITRATE 100 MCG: 50 INJECTION, SOLUTION INTRAMUSCULAR; INTRAVENOUS at 07:29

## 2021-07-08 RX ADMIN — HYDROMORPHONE HYDROCHLORIDE 1 MG: 1 INJECTION, SOLUTION INTRAMUSCULAR; INTRAVENOUS; SUBCUTANEOUS at 23:51

## 2021-07-08 RX ADMIN — FAMOTIDINE 20 MG: 20 TABLET, FILM COATED ORAL at 06:26

## 2021-07-08 RX ADMIN — TRAMADOL HYDROCHLORIDE 50 MG: 50 TABLET, FILM COATED ORAL at 20:19

## 2021-07-08 RX ADMIN — SODIUM CHLORIDE, SODIUM LACTATE, POTASSIUM CHLORIDE, AND CALCIUM CHLORIDE 75 ML/HR: 600; 310; 30; 20 INJECTION, SOLUTION INTRAVENOUS at 23:51

## 2021-07-08 RX ADMIN — MIDAZOLAM 2 MG: 1 INJECTION INTRAMUSCULAR; INTRAVENOUS at 07:29

## 2021-07-08 RX ADMIN — Medication 10 ML: at 15:34

## 2021-07-08 RX ADMIN — KETOROLAC TROMETHAMINE 15 MG: 15 INJECTION, SOLUTION INTRAMUSCULAR; INTRAVENOUS at 21:37

## 2021-07-08 RX ADMIN — MIDAZOLAM 2 MG: 1 INJECTION INTRAMUSCULAR; INTRAVENOUS at 08:07

## 2021-07-08 RX ADMIN — SODIUM CHLORIDE, SODIUM LACTATE, POTASSIUM CHLORIDE, AND CALCIUM CHLORIDE 75 ML/HR: 600; 310; 30; 20 INJECTION, SOLUTION INTRAVENOUS at 12:21

## 2021-07-08 RX ADMIN — LIDOCAINE HYDROCHLORIDE 40 MG: 20 INJECTION, SOLUTION EPIDURAL; INFILTRATION; INTRACAUDAL; PERINEURAL at 08:07

## 2021-07-08 RX ADMIN — TRAZODONE HYDROCHLORIDE 50 MG: 50 TABLET ORAL at 21:38

## 2021-07-08 RX ADMIN — HYDROMORPHONE HYDROCHLORIDE 1 MG: 1 INJECTION, SOLUTION INTRAMUSCULAR; INTRAVENOUS; SUBCUTANEOUS at 18:37

## 2021-07-08 RX ADMIN — PROPOFOL 160 MCG/KG/MIN: 10 INJECTION, EMULSION INTRAVENOUS at 08:11

## 2021-07-08 NOTE — BRIEF OP NOTE
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 Procedure(s):  RIGHT OPEN TREATMENT OF CALCANEAL NONUNION WITH ALLOGRAFT AND BONE MARROW ASPIRATE AND CALCANEOCUBOID JOINT ARTHRODESIS    Surgeon(s):  Katharina Essex, MD    Surgical Assistant: Nurse Practitioner: Rose Cranker, Darroll Reels, RN    Anesthesia: Regional     Estimated Blood Loss (mL): Minimal    Complications: None    Specimens: * No specimens in log *     Implants:   Implant Name Type Inv.  Item Serial No.  Lot No. LRB No. Used Action   LANDIS WEDGE, 12 MM   [id number] [employer]  Right 1 Implanted   STAPLE COMPR W/INSTR [id number] -- NIT DYNANITE STAPLE - [id number]  STAPLE COMPR W/INSTR [id number] -- NIT DYNANITE STAPLE  [employer] [id number] Right 1 Implanted   STAPLE BNE FIX [id number] NIT COMPR W/ INSTR LO PROF FOR - [id number]  STAPLE BNE FIX [id number] NIT COMPR W/ Rose Pel FOR  [employer] [id number] Right 1 Implanted   STAPLE COMPR W/INSTR [id number] -- NIT DYNANITE STAPLE - [id number]  STAPLE COMPR W/INSTR [id number] -- NIT DYNANITE STAPLE  [employer] [id number] Right 1 Implanted       Drains: * No LDAs found *    Findings:     Electronically Signed by Gage Adams MD on 7/8/2021 at 2:54 PM

## 2021-07-08 NOTE — ANESTHESIA PROCEDURE NOTES
Peripheral Block    Start time: 7/8/2021 7:38 AM  End time: 7/8/2021 7:39 AM  Performed by: De Chinchilla MD  Authorized by: De Chinchilla MD       Pre-procedure: Indications: at surgeon's request, post-op pain management and procedure for pain    Preanesthetic Checklist: patient identified, risks and benefits discussed, site marked, timeout performed, anesthesia consent given and patient being monitored    Timeout Time: 07:38 EDT          Block Type:   Block Type:   Adductor canal block  Laterality:  Right  Monitoring:  Standard ASA monitoring, continuous pulse ox, frequent vital sign checks, heart rate, responsive to questions and oxygen  Injection Technique:  Single shot  Procedures: ultrasound guided    Patient Position: supine  Prep: chlorhexidine    Location:  Mid thigh  Needle Type:  Stimuplex  Needle Gauge:  21 G  Needle Localization:  Ultrasound guidance and anatomical landmarks  Medication Injected:  Bupivacaine-EPINEPHrine (PF)(SENSORCAINE) 0.5%-1:200,000 mg injection, 20 mL    Assessment:  Number of attempts:  1  Injection Assessment:  Incremental injection every 5 mL, local visualized surrounding nerve on ultrasound, negative aspiration for blood, no paresthesia, no intravascular symptoms and ultrasound image on chart  Patient tolerance:  Patient tolerated the procedure well with no immediate complications

## 2021-07-08 NOTE — ANESTHESIA PROCEDURE NOTES
Peripheral Block    Start time: 7/8/2021 7:29 AM  End time: 7/8/2021 7:37 AM  Performed by: Gabriel Winn MD  Authorized by: Gabriel Winn MD       Pre-procedure:    Indications: at surgeon's request, post-op pain management and procedure for pain    Preanesthetic Checklist: patient identified, risks and benefits discussed, site marked, timeout performed, anesthesia consent given and patient being monitored    Timeout Time: 07:29 EDT          Block Type:   Block Type:  Popliteal  Laterality:  Right  Monitoring:  Standard ASA monitoring, continuous pulse ox, frequent vital sign checks, heart rate, responsive to questions and oxygen  Injection Technique:  Single shot  Procedures: ultrasound guided    Patient Position: supine  Prep: chlorhexidine    Location:  Lower thigh  Needle Type:  Stimuplex  Needle Gauge:  21 G  Needle Localization:  Ultrasound guidance and anatomical landmarks  Medication Injected:  Bupivacaine-EPINEPHrine (PF)(SENSORCAINE) 0.5%-1:200,000 mg injection, 30 mL    Assessment:  Number of attempts:  1  Injection Assessment:  Incremental injection every 5 mL, local visualized surrounding nerve on ultrasound, negative aspiration for blood, no paresthesia, no intravascular symptoms and ultrasound image on chart  Patient tolerance:  Patient tolerated the procedure well with no immediate complications

## 2021-07-08 NOTE — PERIOP NOTES
TRANSFER - OUT REPORT:    Verbal report given to Rozina Zaragoza RN(name) on Lisset Petit  being transferred to 70(unit) for routine post - op       Report consisted of patients Situation, Background, Assessment and   Recommendations(SBAR). Information from the following report(s) SBAR, OR Summary, Procedure Summary, MAR, Recent Results and Cardiac Rhythm SR was reviewed with the receiving nurse. Lines:   Peripheral IV 07/08/21 Posterior;Right Hand (Active)   Site Assessment Clean, dry, & intact 07/08/21 1028   Phlebitis Assessment 0 07/08/21 1028   Infiltration Assessment 0 07/08/21 1028   Dressing Status Clean, dry, & intact 07/08/21 1028   Dressing Type Tape;Transparent 07/08/21 1028   Hub Color/Line Status Pink; Infusing 07/08/21 1028        Opportunity for questions and clarification was provided.       Patient transported with:   [employer]

## 2021-07-08 NOTE — OP NOTES
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 Open treatment of right calcaneus nonunion, 26001                            2.  Right calcaneocuboid joint arthrodesis, 03398                           3.  Right distal tibial autograft harvest, 20900    SURGEON: Deana Gore MD    ASSISTANT: Jose De Jesus Singh NP  An assistant was required for positioning, retraction, and wound closure for this procedure. HARDWARE:   Implant Name Type Inv. Item Serial No.  Lot No. LRB No. Used Action   LANDIS WEDGE, 12 MM   [id number] [employer]  Right 1 Implanted   STAPLE COMPR W/INSTR [id number] -- NIT DYNANITE STAPLE - [id number]  STAPLE COMPR W/INSTR [id number] -- NIT DYNANITE STAPLE  [employer] [id number] Right 1 Implanted   STAPLE BNE FIX [id number] NIT COMPR W/ INSTR LO PROF FOR - [id number]  STAPLE BNE FIX [id number] NIT COMPR W/ Eduin Nanas FOR  [employer] [id number] Right 1 Implanted   STAPLE COMPR W/INSTR [id number] -- NIT DYNANITE STAPLE - [id number]  STAPLE COMPR W/INSTR [id number] -- NIT DYNANITE STAPLE  [employer] [id number] Right 1 Implanted     INDICATIONS: This patient is a 24y.o. 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  Risks and benefits of the procedure including, but not limited to, anesthetic complications as well as surgical complications including damage to nerves and blood vessels, risk of infection, risk of incomplete pain relief, risk of malunion, nonunion and need for additional surgery have been discussed with the patient who wishes to proceed. PROCEDURE IN DETAIL: A time out was done to confirm the operating procedure, surgeon, patient and site. A block was placed by the department of anesthesia. During a preop surgical timeout the right lower extremity was identified as the correct surgical site and prepped and draped in a standard sterile fashion using alcohol. A large bore needle was then placed in the distal tibia or autograft was obtained and later used in the arthrodesis sites. The patient's previous approach to the foot laterally was then reopened. Peroneal tendons were identified and carefully protected. The underlying nonunion site was then identified. A curette and rongeur were used to remove nonunion fibrous material and saw the anterior process of the calcaneus. Both sides of the nonunion site were then freshened using an osteotome. Additionally the calcaneocuboid joint was also identified and prepared using a curette followed by an osteotome. An allograft wedge soaked in the patient's autograft was then placed inside the nonunion and confirmed to be adequately placed on image intensification. This was secured using a compression staple. At calcaneocuboid joint arthrodesis is also secured at that time using 2 compression staples confirmed to be adequately placed on image intensification. The wound was then closed using Monocryl and nylon sutures. A sterile dressing was applied followed by well-padded posterior splint. Anesthesia was discontinued. The patient was transferred back to recovery bed. She was taken to recovery in satisfactory condition. She appeared to tolerate the procedure well. There were no apparent or lower ascetic complications. All needle and sponge counts are correct. TOURNIQUET TIME: Approx 46  minutes. SPECIMENS: none    ESTIMATED BLOOD LOSS: min mL.

## 2021-07-08 NOTE — H&P
Outpatient Surgery History and Physical:  Pooja Rivera was seen and examined. CHIEF COMPLAINT:   Right foot. PE:     Visit Vitals  /83 (BP 1 Location: Left upper arm, BP Patient Position: At rest)   Pulse (!) 101   Temp 98.6 °F (37 °C)   Resp 16   Wt 130 lb (59 kg)   LMP 07/08/2021   SpO2 98%   BMI 20.36 kg/m²       Heart:   Regular rhythm      Lungs:  Are clear      Past Medical History:    Patient Active Problem List    Diagnosis    Cellulitis of left jaw    Microcytic anemia    Menorrhagia with regular cycle    Depression     daily meds      Sepsis ([address]    Right upper lobe pulmonary infiltrate    Cholecystitis    Parent refuses immunizations    Scoliosis ([facility] by [facility])    S/P foot surgery, left (surgeries for \"flat feet\" - under care of POA)       Surgical History:   Past Surgical History:   Procedure Laterality Date    HX CYST REMOVAL Right 2016    ganglion cyst right hand     HX HEENT      dental surgery for root canal and crown    HX LAP CHOLECYSTECTOMY  2018    HX ORTHOPAEDIC Left age 8    left foot. extra bone removed     HX ORTHOPAEDIC Right age 6    right foot , extra bone removed,     HX ORTHOPAEDIC Left age 15    left foot surgery with hardware    HX ORTHOPAEDIC Left age 15    left surgery surgery to remove screw    HX ORTHOPAEDIC      right ankle     HX ORTHOPAEDIC  06/2016    Left foot painful hardware with subtalar joint    HX ORTHOPAEDIC Left 2016    I&D foot       Social History: Patient  reports that she has never smoked. She has never used smokeless tobacco. She reports that she does not drink alcohol and does not use drugs.     Family History:   Family History   Problem Relation Age of Onset    No Known Problems Mother     Cancer Father         skin cancer    No Known Problems Sister     No Known Problems Brother     No Known Problems Sister     No Known Problems Sister        Allergies: Reviewed per EMR  Allergies   Allergen Reactions  Latex Other (comments)     Redness at site     Chlorhexidine Rash    Betadine [Povidone-Iodine] Hives     Itching, severe       Medications:    No current facility-administered medications on file prior to encounter. Current Outpatient Medications on File Prior to Encounter   Medication Sig    traZODone (DESYREL) 50 mg tablet TAKE 1 TABLET BY MOUTH EVERY NIGHT AT BEDTIME    Venlafaxine-ER 24 HR (EFFEXOR-ER) 75 mg tr24 tablet Take 75 mg by mouth every morning.  acetaminophen (Tylenol Extra Strength) 500 mg tablet Take 500 mg by mouth every six (6) hours as needed for Pain.  hydrOXYzine HCl (ATARAX) 25 mg tablet Take 25 mg by mouth daily. The surgery is planned for the RIGHT OPEN TREATMENT OF CALCANEAL NONUNION WITH ALLOGRAFT AND BONE MARROW ASPIRATE AND CALCANEOCUBOID JOINT ARTHRODESIS  . History and physical has been reviewed. The patient has been examined. There have been no significant clinical changes since the completion of the originally dated History and Physical.  Patient identified by surgeon; surgical site was confirmed by patient and surgeon. The patient is here today for outpatient surgery. I have examined the patient, no changes are noted in the patient's medical status. Necessity for the procedure/care is still present and the history and physical above is current. See the office notes for the full long term history of the problem. Please see the recent office notes for the musculoskeletal examination.     Signed By: Peyton Balderas NP     July 8, 2021 6:58 AM

## 2021-07-08 NOTE — ANESTHESIA POSTPROCEDURE EVALUATION
Procedure(s):  RIGHT OPEN TREATMENT OF CALCANEAL NONUNION WITH ALLOGRAFT AND BONE MARROW ASPIRATE AND CALCANEOCUBOID JOINT ARTHRODESIS.    total IV anesthesia, general - backup    Anesthesia Post Evaluation      Multimodal analgesia: multimodal analgesia not used between 6 hours prior to anesthesia start to PACU discharge  Patient location during evaluation: PACU  Patient participation: complete - patient participated  Level of consciousness: awake and alert  Pain management: adequate  Airway patency: patent  Anesthetic complications: no  Cardiovascular status: hemodynamically stable  Respiratory status: acceptable  Hydration status: acceptable        INITIAL Post-op Vital signs:   Vitals Value Taken Time   BP 90/53 07/08/21 0958   Temp 36.4 °C (97.5 °F) 07/08/21 0924   Pulse 93 07/08/21 1003   Resp 14 07/08/21 0953   SpO2 97 % 07/08/21 1003   Vitals shown include unvalidated device data.

## 2021-07-08 NOTE — PROGRESS NOTES
TRANSFER - IN REPORT:    Verbal report received from (Camri) on Melchor Padmini  being received from (outpatient PACU) for routine progression of care      Report consisted of patients Situation, Background, Assessment and   Recommendations(SBAR). Information from the following report(s) SBAR, OR Summary and Procedure Summary was reviewed with the receiving nurse. Opportunity for questions and clarification was provided. Assessment completed upon patients arrival to unit and care assumed.

## 2021-07-09 PROCEDURE — 74011250637 HC RX REV CODE- 250/637: Performed by: NURSE PRACTITIONER

## 2021-07-09 PROCEDURE — 94760 N-INVAS EAR/PLS OXIMETRY 1: CPT

## 2021-07-09 PROCEDURE — 74011250636 HC RX REV CODE- 250/636: Performed by: ANESTHESIOLOGY

## 2021-07-09 PROCEDURE — 96375 TX/PRO/DX INJ NEW DRUG ADDON: CPT

## 2021-07-09 PROCEDURE — 74011250637 HC RX REV CODE- 250/637: Performed by: PHYSICIAN ASSISTANT

## 2021-07-09 PROCEDURE — 74011250636 HC RX REV CODE- 250/636: Performed by: NURSE PRACTITIONER

## 2021-07-09 PROCEDURE — 96374 THER/PROPH/DIAG INJ IV PUSH: CPT

## 2021-07-09 PROCEDURE — 99218 HC RM OBSERVATION: CPT

## 2021-07-09 PROCEDURE — 96376 TX/PRO/DX INJ SAME DRUG ADON: CPT

## 2021-07-09 PROCEDURE — 2709999900 HC NON-CHARGEABLE SUPPLY

## 2021-07-09 PROCEDURE — 74011000250 HC RX REV CODE- 250: Performed by: ANESTHESIOLOGY

## 2021-07-09 PROCEDURE — 96372 THER/PROPH/DIAG INJ SC/IM: CPT

## 2021-07-09 RX ORDER — HYDROMORPHONE HYDROCHLORIDE 1 MG/ML
1 INJECTION, SOLUTION INTRAMUSCULAR; INTRAVENOUS; SUBCUTANEOUS EVERY 4 HOURS
Status: DISCONTINUED | OUTPATIENT
Start: 2021-07-09 | End: 2021-07-09

## 2021-07-09 RX ORDER — OXYCODONE HYDROCHLORIDE 5 MG/1
10 TABLET ORAL
Status: DISCONTINUED | OUTPATIENT
Start: 2021-07-09 | End: 2021-07-09

## 2021-07-09 RX ORDER — OXYCODONE HYDROCHLORIDE 5 MG/1
10 TABLET ORAL EVERY 4 HOURS
Status: DISCONTINUED | OUTPATIENT
Start: 2021-07-09 | End: 2021-07-11

## 2021-07-09 RX ORDER — TIZANIDINE 2 MG/1
4 TABLET ORAL 4 TIMES DAILY
Status: DISCONTINUED | OUTPATIENT
Start: 2021-07-09 | End: 2021-07-11 | Stop reason: HOSPADM

## 2021-07-09 RX ORDER — KETOROLAC TROMETHAMINE 30 MG/ML
30 INJECTION, SOLUTION INTRAMUSCULAR; INTRAVENOUS EVERY 6 HOURS
Status: DISCONTINUED | OUTPATIENT
Start: 2021-07-09 | End: 2021-07-11 | Stop reason: HOSPADM

## 2021-07-09 RX ORDER — TRAMADOL HYDROCHLORIDE 50 MG/1
50 TABLET ORAL
Status: DISCONTINUED | OUTPATIENT
Start: 2021-07-09 | End: 2021-07-11 | Stop reason: HOSPADM

## 2021-07-09 RX ORDER — HYDROMORPHONE HYDROCHLORIDE 1 MG/ML
1 INJECTION, SOLUTION INTRAMUSCULAR; INTRAVENOUS; SUBCUTANEOUS
Status: DISCONTINUED | OUTPATIENT
Start: 2021-07-09 | End: 2021-07-11 | Stop reason: HOSPADM

## 2021-07-09 RX ADMIN — OXYCODONE 10 MG: 5 TABLET ORAL at 08:13

## 2021-07-09 RX ADMIN — HYDROMORPHONE HYDROCHLORIDE 1 MG: 1 INJECTION, SOLUTION INTRAMUSCULAR; INTRAVENOUS; SUBCUTANEOUS at 14:16

## 2021-07-09 RX ADMIN — Medication 5 ML: at 22:00

## 2021-07-09 RX ADMIN — TIZANIDINE 4 MG: 2 TABLET ORAL at 20:12

## 2021-07-09 RX ADMIN — Medication 10 ML: at 15:23

## 2021-07-09 RX ADMIN — Medication 5 ML: at 21:46

## 2021-07-09 RX ADMIN — OXYCODONE 10 MG: 5 TABLET ORAL at 20:12

## 2021-07-09 RX ADMIN — VENLAFAXINE HYDROCHLORIDE 225 MG: 75 CAPSULE, EXTENDED RELEASE ORAL at 08:13

## 2021-07-09 RX ADMIN — OXYCODONE 10 MG: 5 TABLET ORAL at 15:50

## 2021-07-09 RX ADMIN — TIZANIDINE 4 MG: 2 TABLET ORAL at 15:13

## 2021-07-09 RX ADMIN — TRAZODONE HYDROCHLORIDE 50 MG: 50 TABLET ORAL at 21:46

## 2021-07-09 RX ADMIN — PROMETHAZINE HYDROCHLORIDE 3.25 MG: 25 INJECTION INTRAMUSCULAR; INTRAVENOUS at 11:07

## 2021-07-09 RX ADMIN — HYDROMORPHONE HYDROCHLORIDE 1 MG: 1 INJECTION, SOLUTION INTRAMUSCULAR; INTRAVENOUS; SUBCUTANEOUS at 21:46

## 2021-07-09 RX ADMIN — ENOXAPARIN SODIUM 40 MG: 40 INJECTION SUBCUTANEOUS at 21:46

## 2021-07-09 RX ADMIN — OXYCODONE 10 MG: 5 TABLET ORAL at 12:00

## 2021-07-09 RX ADMIN — HYDROMORPHONE HYDROCHLORIDE 1 MG: 1 INJECTION, SOLUTION INTRAMUSCULAR; INTRAVENOUS; SUBCUTANEOUS at 03:42

## 2021-07-09 RX ADMIN — KETOROLAC TROMETHAMINE 30 MG: 30 INJECTION, SOLUTION INTRAMUSCULAR; INTRAVENOUS at 15:45

## 2021-07-09 RX ADMIN — KETOROLAC TROMETHAMINE 30 MG: 30 INJECTION, SOLUTION INTRAMUSCULAR; INTRAVENOUS at 20:12

## 2021-07-09 RX ADMIN — Medication 5 ML: at 21:50

## 2021-07-09 RX ADMIN — HYDROMORPHONE HYDROCHLORIDE 1 MG: 1 INJECTION, SOLUTION INTRAMUSCULAR; INTRAVENOUS; SUBCUTANEOUS at 18:11

## 2021-07-09 RX ADMIN — TRAMADOL HYDROCHLORIDE 50 MG: 50 TABLET, FILM COATED ORAL at 02:39

## 2021-07-09 RX ADMIN — Medication 10 ML: at 15:22

## 2021-07-09 RX ADMIN — HYDROMORPHONE HYDROCHLORIDE 1 MG: 1 INJECTION, SOLUTION INTRAMUSCULAR; INTRAVENOUS; SUBCUTANEOUS at 10:17

## 2021-07-09 NOTE — PROGRESS NOTES
[address] FRACTURE PROGRESS NOTE    2021  Admit Date:   2021    Post Op day: 1 Day Post-Op    Subjective:    Mai Niño reports being in a great deal of pain. Upon my arrival she was crying in bed, stating she hadn't had anything since 3:30am. She would like her pain to be better controlled before going home.      PT/OT:   Gait:   NWB                 Vital Signs:    Patient Vitals for the past 8 hrs:   BP Temp Pulse Resp SpO2   21 0425 99/65 97.9 °F (36.6 °C) 97 18 99 %     Temp (24hrs), Av °F (36.7 °C), Min:97.5 °F (36.4 °C), Max:98.4 °F (36.9 °C)      Pain Control:   Pain Assessment  Pain Scale 1: FLACC  Pain Intensity 1: 0  Pain Onset 1: post op  Pain Location 1: Foot  Pain Orientation 1: Right  Pain Description 1: Stabbing, Aching  Pain Intervention(s) 1: Medication (see MAR)    Meds:    Current Facility-Administered Medications   Medication Dose Route Frequency    oxyCODONE IR (ROXICODONE) tablet 10 mg  10 mg Oral Q4H PRN    traMADoL (ULTRAM) tablet 50 mg  50 mg Oral Q6H PRN    sodium chloride (NS) flush 5-40 mL  5-40 mL IntraVENous Q8H    sodium chloride (NS) flush 5-40 mL  5-40 mL IntraVENous PRN    lidocaine (XYLOCAINE) 10 mg/mL (1 %) injection 0.1 mL  0.1 mL SubCUTAneous PRN    lactated Ringers infusion  75 mL/hr IntraVENous CONTINUOUS    promethazine (PHENERGAN) with saline injection 3.25 mg  3.25 mg IntraVENous Q15MIN PRN    traZODone (DESYREL) tablet 50 mg  50 mg Oral QHS    venlafaxine-SR (EFFEXOR-XR) capsule 225 mg  225 mg Oral DAILY    sodium chloride (NS) flush 5-40 mL  5-40 mL IntraVENous Q8H    sodium chloride (NS) flush 5-40 mL  5-40 mL IntraVENous PRN    acetaminophen (TYLENOL) tablet 650 mg  650 mg Oral Q4H PRN    HYDROmorphone (DILAUDID) injection 1 mg  1 mg IntraVENous Q4H PRN    enoxaparin (LOVENOX) injection 40 mg  40 mg SubCUTAneous Q24H    traZODone (DESYREL) tablet 50 mg  50 mg Oral QHS PRN       LAB:    No results for input(s): HCT, HCTEXT, HGB, HGBEXT, INR, INREXT, HCTEXT, HGBEXT, INREXT in the last 72 hours. 24 Hour Assessment Issues:    Oriented    Discharge Planning: HOME    Assessment & Physician's Comment:  Neurovascular checks within normal limits, dressing is clean and intact, right foot elevated on pillow. Will attempt to get pain under control today for discharge. 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 D/C to home today or tomorrow. Office follow up in 2 weeks with Dr Arely Cross .        Dorian Knight NP

## 2021-07-09 NOTE — PROGRESS NOTES
Problem: Falls - Risk of  Goal: *Absence of Falls  Description: Document Ethel Lewis Fall Risk and appropriate interventions in the flowsheet.   Outcome: Progressing Towards Goal  Note: Fall Risk Interventions:  Mobility Interventions: Bed/chair exit alarm, Patient to call before getting OOB         Medication Interventions: Bed/chair exit alarm, Evaluate medications/consider consulting pharmacy, Patient to call before getting OOB, Teach patient to arise slowly    Elimination Interventions: Call light in reach, Bed/chair exit alarm, Patient to call for help with toileting needs, Stay With Me (per policy)              Problem: Patient Education: Go to Patient Education Activity  Goal: Patient/Family Education  Outcome: Progressing Towards Goal     Problem: Pain  Goal: *Control of Pain  Outcome: Progressing Towards Goal     Problem: Patient Education: Go to Patient Education Activity  Goal: Patient/Family Education  Outcome: Progressing Towards Goal

## 2021-07-09 NOTE — PROGRESS NOTES
Pt c/o  RLE pain. She was given 1 mg dilaudid at 2351 She states the tramadol doesn't help. She is requesting oxycodone, states Dr. Tavares Hill said in pre-op that is what she was going to have. On call physician, CAMERON Greene notified. States \"  Patient will need to discuss pain medication in the morning with the surgeon on rounds. \"

## 2021-07-09 NOTE — PROGRESS NOTES
Problem: Falls - Risk of  Goal: *Absence of Falls  Description: Document Ruben Babin Fall Risk and appropriate interventions in the flowsheet.   Outcome: Progressing Towards Goal  Note: Fall Risk Interventions:  Mobility Interventions: Bed/chair exit alarm, OT consult for ADLs, Patient to call before getting OOB, PT Consult for mobility concerns         Medication Interventions: Bed/chair exit alarm, Patient to call before getting OOB, Teach patient to arise slowly    Elimination Interventions: Bed/chair exit alarm, Call light in reach, Patient to call for help with toileting needs, Toileting schedule/hourly rounds              Problem: Patient Education: Go to Patient Education Activity  Goal: Patient/Family Education  Outcome: Progressing Towards Goal     Problem: Pain  Goal: *Control of Pain  Outcome: Progressing Towards Goal     Problem: Patient Education: Go to Patient Education Activity  Goal: Patient/Family Education  Outcome: Progressing Towards Goal

## 2021-07-09 NOTE — PROGRESS NOTES
Chart screened by  for potential discharge needs or concerns. None identified at this time. Pt is medically cleared for dc to home today. Please notify/consult  if any discharge needs arise. Care Management Interventions  PCP Verified by CM: Yes (no PCP; goes to urgent care for needs)  Mode of Transport at Discharge:  Other (see comment) (family)  Discharge Durable Medical Equipment: No  Physical Therapy Consult: No  Occupational Therapy Consult: No  Speech Therapy Consult: No  Current Support Network: Relative's Home  Confirm Follow Up Transport: Family  Discharge Location  Discharge Placement: Home with family assistance

## 2021-07-10 LAB
ANION GAP SERPL CALC-SCNC: 2 MMOL/L (ref 7–16)
BUN SERPL-MCNC: 7 MG/DL (ref 6–23)
CALCIUM SERPL-MCNC: 8.3 MG/DL (ref 8.3–10.4)
CHLORIDE SERPL-SCNC: 106 MMOL/L (ref 98–107)
CO2 SERPL-SCNC: 32 MMOL/L (ref 21–32)
CREAT SERPL-MCNC: 0.43 MG/DL (ref 0.6–1)
ERYTHROCYTE [DISTWIDTH] IN BLOOD BY AUTOMATED COUNT: 16.8 % (ref 11.9–14.6)
GLUCOSE SERPL-MCNC: 110 MG/DL (ref 65–100)
HCT VFR BLD AUTO: 24.6 % (ref 35.8–46.3)
HGB BLD-MCNC: 6.7 G/DL (ref 11.7–15.4)
HISTORY CHECKED?,CKHIST: NORMAL
MCH RBC QN AUTO: 20.6 PG (ref 26.1–32.9)
MCHC RBC AUTO-ENTMCNC: 27.2 G/DL (ref 31.4–35)
MCV RBC AUTO: 75.5 FL (ref 79.6–97.8)
NRBC # BLD: 0 K/UL (ref 0–0.2)
PLATELET # BLD AUTO: 220 K/UL (ref 150–450)
PMV BLD AUTO: 10.4 FL (ref 9.4–12.3)
POTASSIUM SERPL-SCNC: 3.7 MMOL/L (ref 3.5–5.1)
RBC # BLD AUTO: 3.26 M/UL (ref 4.05–5.2)
SODIUM SERPL-SCNC: 140 MMOL/L (ref 136–145)
WBC # BLD AUTO: 6.8 K/UL (ref 4.3–11.1)

## 2021-07-10 PROCEDURE — 2709999900 HC NON-CHARGEABLE SUPPLY

## 2021-07-10 PROCEDURE — 86901 BLOOD TYPING SEROLOGIC RH(D): CPT

## 2021-07-10 PROCEDURE — 74011250636 HC RX REV CODE- 250/636: Performed by: NURSE PRACTITIONER

## 2021-07-10 PROCEDURE — 80048 BASIC METABOLIC PNL TOTAL CA: CPT

## 2021-07-10 PROCEDURE — 99218 HC RM OBSERVATION: CPT

## 2021-07-10 PROCEDURE — 96372 THER/PROPH/DIAG INJ SC/IM: CPT

## 2021-07-10 PROCEDURE — 96376 TX/PRO/DX INJ SAME DRUG ADON: CPT

## 2021-07-10 PROCEDURE — 36430 TRANSFUSION BLD/BLD COMPNT: CPT

## 2021-07-10 PROCEDURE — P9016 RBC LEUKOCYTES REDUCED: HCPCS

## 2021-07-10 PROCEDURE — 85027 COMPLETE CBC AUTOMATED: CPT

## 2021-07-10 PROCEDURE — 74011250637 HC RX REV CODE- 250/637: Performed by: NURSE PRACTITIONER

## 2021-07-10 PROCEDURE — 86923 COMPATIBILITY TEST ELECTRIC: CPT

## 2021-07-10 PROCEDURE — 36415 COLL VENOUS BLD VENIPUNCTURE: CPT

## 2021-07-10 RX ORDER — SODIUM CHLORIDE 9 MG/ML
250 INJECTION, SOLUTION INTRAVENOUS AS NEEDED
Status: DISCONTINUED | OUTPATIENT
Start: 2021-07-10 | End: 2021-07-11 | Stop reason: HOSPADM

## 2021-07-10 RX ADMIN — VENLAFAXINE HYDROCHLORIDE 225 MG: 75 CAPSULE, EXTENDED RELEASE ORAL at 08:01

## 2021-07-10 RX ADMIN — OXYCODONE 10 MG: 5 TABLET ORAL at 00:00

## 2021-07-10 RX ADMIN — OXYCODONE 10 MG: 5 TABLET ORAL at 05:40

## 2021-07-10 RX ADMIN — ENOXAPARIN SODIUM 40 MG: 40 INJECTION SUBCUTANEOUS at 22:06

## 2021-07-10 RX ADMIN — KETOROLAC TROMETHAMINE 30 MG: 30 INJECTION, SOLUTION INTRAMUSCULAR; INTRAVENOUS at 02:40

## 2021-07-10 RX ADMIN — OXYCODONE 10 MG: 5 TABLET ORAL at 09:19

## 2021-07-10 RX ADMIN — HYDROMORPHONE HYDROCHLORIDE 1 MG: 1 INJECTION, SOLUTION INTRAMUSCULAR; INTRAVENOUS; SUBCUTANEOUS at 02:40

## 2021-07-10 RX ADMIN — KETOROLAC TROMETHAMINE 30 MG: 30 INJECTION, SOLUTION INTRAMUSCULAR; INTRAVENOUS at 14:21

## 2021-07-10 RX ADMIN — HYDROMORPHONE HYDROCHLORIDE 1 MG: 1 INJECTION, SOLUTION INTRAMUSCULAR; INTRAVENOUS; SUBCUTANEOUS at 22:06

## 2021-07-10 RX ADMIN — TIZANIDINE 4 MG: 2 TABLET ORAL at 00:00

## 2021-07-10 RX ADMIN — OXYCODONE 10 MG: 5 TABLET ORAL at 23:53

## 2021-07-10 RX ADMIN — Medication 10 ML: at 04:48

## 2021-07-10 RX ADMIN — TIZANIDINE 4 MG: 2 TABLET ORAL at 18:17

## 2021-07-10 RX ADMIN — Medication 10 ML: at 22:13

## 2021-07-10 RX ADMIN — Medication 5 ML: at 13:33

## 2021-07-10 RX ADMIN — OXYCODONE 10 MG: 5 TABLET ORAL at 12:16

## 2021-07-10 RX ADMIN — OXYCODONE 10 MG: 5 TABLET ORAL at 20:17

## 2021-07-10 RX ADMIN — Medication 10 ML: at 22:10

## 2021-07-10 RX ADMIN — TIZANIDINE 4 MG: 2 TABLET ORAL at 23:53

## 2021-07-10 RX ADMIN — HYDROMORPHONE HYDROCHLORIDE 1 MG: 1 INJECTION, SOLUTION INTRAMUSCULAR; INTRAVENOUS; SUBCUTANEOUS at 10:24

## 2021-07-10 RX ADMIN — TRAZODONE HYDROCHLORIDE 50 MG: 50 TABLET ORAL at 22:06

## 2021-07-10 RX ADMIN — OXYCODONE 10 MG: 5 TABLET ORAL at 16:42

## 2021-07-10 RX ADMIN — Medication 5 ML: at 02:50

## 2021-07-10 RX ADMIN — HYDROMORPHONE HYDROCHLORIDE 1 MG: 1 INJECTION, SOLUTION INTRAMUSCULAR; INTRAVENOUS; SUBCUTANEOUS at 18:17

## 2021-07-10 RX ADMIN — TIZANIDINE 4 MG: 2 TABLET ORAL at 14:21

## 2021-07-10 RX ADMIN — Medication 5 ML: at 13:34

## 2021-07-10 RX ADMIN — KETOROLAC TROMETHAMINE 30 MG: 30 INJECTION, SOLUTION INTRAMUSCULAR; INTRAVENOUS at 08:01

## 2021-07-10 RX ADMIN — KETOROLAC TROMETHAMINE 30 MG: 30 INJECTION, SOLUTION INTRAMUSCULAR; INTRAVENOUS at 22:06

## 2021-07-10 RX ADMIN — HYDROMORPHONE HYDROCHLORIDE 1 MG: 1 INJECTION, SOLUTION INTRAMUSCULAR; INTRAVENOUS; SUBCUTANEOUS at 14:21

## 2021-07-10 RX ADMIN — TIZANIDINE 4 MG: 2 TABLET ORAL at 02:41

## 2021-07-10 NOTE — PROGRESS NOTES
[facility]   Progress Note    Patient: Peyton Ryan MRN: [id number]  SSN: [id number]    YOB: 2000  Age: 24 y.o. Sex: female      Admit Date: 7/8/2021    LOS: 1 day     Subjective:     Resting well. Pain controlled overnight to a 4/10 Blood pressures soft. Objective:     Vitals:    07/10/21 0540 07/10/21 0756 07/10/21 0948 07/10/21 1004   BP: (!) 91/58 94/60 95/63 100/66   Pulse:  74 69 73   Resp:  17 18 18   Temp:  97.4 °F (36.3 °C) 98 °F (36.7 °C)    SpO2:  96% 100% 100%   Weight:            Intake and Output:  Current Shift: No intake/output data recorded. Last three shifts: No intake/output data recorded. Physical Exam:   RLE: splint c/d/I. +silt in toes. toes wwp x5 w/ BCR <2s in each. toe flicker intact but motion limited. ROM at knee and hip normal.  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 She has history of anemia. I do not feel this is acute blood loss, rather chronic anemia. 5: Continue pain control to 4/10 as long as BP is stable. IF systolic get in 85'X need to with-hold.   6: due to transfusion - plan to d/c tomorrow and not today    Signed By: Mariano Blank MD     July 10, 2021

## 2021-07-10 NOTE — DISCHARGE SUMMARY
[facility] Discharge Summary      Patient ID:  Linda Ledezma  [id number]  89 y.o.  2000    Allergies: Latex, Chlorhexidine, and Betadine [povidone-iodine]    Admit date: 7/8/2021    Discharge date and time: No discharge date for patient encounter. 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 ICD-10-CM: M19.071  ICD-9-CM: 716.97  7/8/2021 - Present Yes              Surgeon: Krista Cunningham MD     * Procedure: Procedure(s):  RIGHT OPEN TREATMENT OF CALCANEAL NONUNION WITH ALLOGRAFT AND BONE MARROW ASPIRATE AND CALCANEOCUBOID JOINT ARTHRODESIS           Perioperative Antibiotics: Ancef  _2gms __                                                  Postoperative Pain Management:  Dilaudid 1mg        Roxicodoone 10mg        Toradol 30mg        Tizanidine 4mg     DVT Prophylaxis: Lovenox 40mg SQ                                      Post Op complications: hemoglobin noted 6.7 on POD 1 - tranfused 2 unit RBC's. Pain controlled to 4/10. Hemoglobin at discharge: No labs post surgery    * Discharge Condition: Stable without wound complications  Wound appears to be healing without any evidence of infection. Pateint's pain in becoming better controlled. * Discharged to: Home with office follow up    * Follow-up Care/Discharge instructions:  - Resume pre hospital diet            - Resume home medications per medical continuation form     - Pt will remain NWB until office follow up.    - Follow up in office as scheduled       Signed:  Kit Granger NP  7/9/2021  9:59 PM

## 2021-07-10 NOTE — PROGRESS NOTES
Pt is discharging home in stable condition. No d/c needs identified. Tx goals met. Care Management Interventions  PCP Verified by CM: No (Goes to Urgent Care for PCP needs)  Mode of Transport at Discharge:  Other (see comment)  Transition of Care Consult (CM Consult): Discharge Planning  Discharge Durable Medical Equipment: No  Physical Therapy Consult: No  Occupational Therapy Consult: No  Speech Therapy Consult: No  Current Support Network: Family Lives [place], Own Home  Confirm Follow Up Transport: Family  The Plan for Transition of Care is Related to the Following Treatment Goals : Return home and back to her baseline  The Patient and/or Patient Representative was Provided with a Choice of Provider and Agrees with the Discharge Plan?: Yes  Name of the Patient Representative Who was Provided with a Choice of Provider and Agrees with the Discharge Plan: Patient  Freedom of Choice List was Provided with Basic Dialogue that Supports the Patient's Individualized Plan of Care/Goals, Treatment Preferences and Shares the Quality Data Associated with the Providers?: Yes  [place] Resource Information Provided?: No  Discharge Location  Discharge Placement: Home

## 2021-07-11 VITALS
HEART RATE: 84 BPM | OXYGEN SATURATION: 97 % | SYSTOLIC BLOOD PRESSURE: 111 MMHG | BODY MASS INDEX: 20.36 KG/M2 | TEMPERATURE: 98.3 F | DIASTOLIC BLOOD PRESSURE: 73 MMHG | WEIGHT: 130 LBS | RESPIRATION RATE: 16 BRPM

## 2021-07-11 LAB
ABO + RH BLD: NORMAL
BLD PROD TYP BPU: NORMAL
BLD PROD TYP BPU: NORMAL
BLOOD GROUP ANTIBODIES SERPL: NORMAL
BPU ID: NORMAL
BPU ID: NORMAL
CROSSMATCH RESULT,%XM: NORMAL
CROSSMATCH RESULT,%XM: NORMAL
HCT VFR BLD AUTO: 32.2 % (ref 35.8–46.3)
HGB BLD-MCNC: 9.4 G/DL (ref 11.7–15.4)
SPECIMEN EXP DATE BLD: NORMAL
STATUS OF UNIT,%ST: NORMAL
STATUS OF UNIT,%ST: NORMAL
UNIT DIVISION, %UDIV: 0
UNIT DIVISION, %UDIV: 0

## 2021-07-11 PROCEDURE — 99218 HC RM OBSERVATION: CPT

## 2021-07-11 PROCEDURE — 74011250636 HC RX REV CODE- 250/636: Performed by: NURSE PRACTITIONER

## 2021-07-11 PROCEDURE — 96376 TX/PRO/DX INJ SAME DRUG ADON: CPT

## 2021-07-11 PROCEDURE — 85018 HEMOGLOBIN: CPT

## 2021-07-11 PROCEDURE — 36415 COLL VENOUS BLD VENIPUNCTURE: CPT

## 2021-07-11 PROCEDURE — 74011250637 HC RX REV CODE- 250/637: Performed by: PHYSICIAN ASSISTANT

## 2021-07-11 PROCEDURE — 74011250637 HC RX REV CODE- 250/637: Performed by: NURSE PRACTITIONER

## 2021-07-11 RX ORDER — HYDROMORPHONE HYDROCHLORIDE 2 MG/1
2 TABLET ORAL
Status: DISCONTINUED | OUTPATIENT
Start: 2021-07-11 | End: 2021-07-11 | Stop reason: HOSPADM

## 2021-07-11 RX ORDER — HYDROMORPHONE HYDROCHLORIDE 2 MG/1
2 TABLET ORAL
Qty: 30 TABLET | Refills: 0 | Status: SHIPPED | OUTPATIENT
Start: 2021-07-11 | End: 2021-07-12 | Stop reason: SINTOL

## 2021-07-11 RX ADMIN — OXYCODONE 10 MG: 5 TABLET ORAL at 08:21

## 2021-07-11 RX ADMIN — TIZANIDINE 4 MG: 2 TABLET ORAL at 14:42

## 2021-07-11 RX ADMIN — KETOROLAC TROMETHAMINE 30 MG: 30 INJECTION, SOLUTION INTRAMUSCULAR; INTRAVENOUS at 02:56

## 2021-07-11 RX ADMIN — HYDROMORPHONE HYDROCHLORIDE 2 MG: 2 TABLET ORAL at 16:44

## 2021-07-11 RX ADMIN — Medication 10 ML: at 14:45

## 2021-07-11 RX ADMIN — KETOROLAC TROMETHAMINE 30 MG: 30 INJECTION, SOLUTION INTRAMUSCULAR; INTRAVENOUS at 08:21

## 2021-07-11 RX ADMIN — HYDROMORPHONE HYDROCHLORIDE 1 MG: 1 INJECTION, SOLUTION INTRAMUSCULAR; INTRAVENOUS; SUBCUTANEOUS at 06:57

## 2021-07-11 RX ADMIN — OXYCODONE 10 MG: 5 TABLET ORAL at 03:00

## 2021-07-11 RX ADMIN — HYDROMORPHONE HYDROCHLORIDE 2 MG: 2 TABLET ORAL at 11:59

## 2021-07-11 RX ADMIN — VENLAFAXINE HYDROCHLORIDE 225 MG: 75 CAPSULE, EXTENDED RELEASE ORAL at 08:21

## 2021-07-11 RX ADMIN — Medication 10 ML: at 14:44

## 2021-07-11 RX ADMIN — KETOROLAC TROMETHAMINE 30 MG: 30 INJECTION, SOLUTION INTRAMUSCULAR; INTRAVENOUS at 14:42

## 2021-07-11 RX ADMIN — Medication 10 ML: at 03:01

## 2021-07-11 RX ADMIN — TIZANIDINE 4 MG: 2 TABLET ORAL at 02:56

## 2021-07-11 NOTE — PROGRESS NOTES
Problem: Falls - Risk of  Goal: *Absence of Falls  Description: Document Beatriz Ortega Fall Risk and appropriate interventions in the flowsheet.   7/11/2021 1535 by Annalisa George RN  Outcome: Resolved/Met  7/11/2021 0910 by Annalisa George RN  Outcome: Progressing Towards Goal  Note: Fall Risk Interventions:  Mobility Interventions: Bed/chair exit alarm, OT consult for ADLs, Patient to call before getting OOB, PT Consult for mobility concerns         Medication Interventions: Bed/chair exit alarm, Patient to call before getting OOB, Teach patient to arise slowly    Elimination Interventions: Bed/chair exit alarm, Call light in reach, Patient to call for help with toileting needs, Toileting schedule/hourly rounds              Problem: Patient Education: Go to Patient Education Activity  Goal: Patient/Family Education  7/11/2021 1535 by Annalisa George RN  Outcome: Resolved/Met  7/11/2021 0910 by Annalisa George RN  Outcome: Progressing Towards Goal     Problem: Pain  Goal: *Control of Pain  7/11/2021 1535 by Annalisa George RN  Outcome: Resolved/Met  7/11/2021 0910 by Annalisa George RN  Outcome: Progressing Towards Goal     Problem: Patient Education: Go to Patient Education Activity  Goal: Patient/Family Education  7/11/2021 1535 by Annalisa George RN  Outcome: Resolved/Met  7/11/2021 0910 by Annalisa George RN  Outcome: Progressing Towards Goal

## 2021-07-11 NOTE — PROGRESS NOTES
Problem: Falls - Risk of  Goal: *Absence of Falls  Description: Document Trini Pillow Fall Risk and appropriate interventions in the flowsheet.   Outcome: Progressing Towards Goal  Note: Fall Risk Interventions:  Mobility Interventions: Bed/chair exit alarm, OT consult for ADLs, Patient to call before getting OOB, PT Consult for mobility concerns         Medication Interventions: Bed/chair exit alarm, Patient to call before getting OOB, Teach patient to arise slowly    Elimination Interventions: Bed/chair exit alarm, Call light in reach, Patient to call for help with toileting needs, Toileting schedule/hourly rounds              Problem: Patient Education: Go to Patient Education Activity  Goal: Patient/Family Education  Outcome: Progressing Towards Goal     Problem: Pain  Goal: *Control of Pain  Outcome: Progressing Towards Goal     Problem: Patient Education: Go to Patient Education Activity  Goal: Patient/Family Education  Outcome: Progressing Towards Goal

## 2021-07-11 NOTE — PROGRESS NOTES
Pt is medically cleared for dc to home today. No dc needs or concerns identified or reported. SW remains available to assist if needed. Care Management Interventions  PCP Verified by CM: No (Goes to Urgent Care for PCP needs)  Mode of Transport at Discharge:  Other (see comment)  Transition of Care Consult (CM Consult): Discharge Planning  Discharge Durable Medical Equipment: No  Physical Therapy Consult: No  Occupational Therapy Consult: No  Speech Therapy Consult: No  Current Support Network: Family Lives [place], Own Home  Confirm Follow Up Transport: Family  The Plan for Transition of Care is Related to the Following Treatment Goals : Return home and back to her baseline  The Patient and/or Patient Representative was Provided with a Choice of Provider and Agrees with the Discharge Plan?: Yes  Name of the Patient Representative Who was Provided with a Choice of Provider and Agrees with the Discharge Plan: Patient  Freedom of Choice List was Provided with Basic Dialogue that Supports the Patient's Individualized Plan of Care/Goals, Treatment Preferences and Shares the Quality Data Associated with the Providers?: Yes  [facility] Resource Information Provided?: No  Discharge Location  Discharge Placement: Home

## 2021-07-11 NOTE — PROGRESS NOTES
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 Vital Signs:    Patient Vitals for the past 8 hrs:   BP Temp Pulse Resp SpO2   21 0736 103/69 97.7 °F (36.5 °C) 72 16 97 %   21 0350 (!) 90/55 97.7 °F (36.5 °C) 74 16 99 %   21 0255 94/60         Temp (24hrs), Av.8 °F (36.6 °C), Min:97.7 °F (36.5 °C), Max:98 °F (36.7 °C)    Pain Control:   Pain Assessment  Pain Scale 1: Numeric (0 - 10)  Pain Intensity 1: 8  Pain Onset 1: post op  Pain Location 1: Foot  Pain Orientation 1: Right  Pain Description 1: Aching  Pain Intervention(s) 1: Medication (see MAR)  Subjective: Appears to be resting comfortably in bed, still c/o moderate pain, no other complaints. Objective:  No Acute Distress, Alert and Oriented, RLE splint in place, C/D/I. Neurovascular exam is normal.  Limited toe motion due to pain. Assessment:   Patient Active Problem List   Diagnosis Code    Parent refuses immunizations Z28.82    Scoliosis (managed by [facility] [place] [facility]) M41.9    S/P foot surgery, left (surgeries for \"flat feet\" - under care of POA) Z98.890    Cholecystitis K81.9    Cellulitis of left jaw L03. 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 S/p 2 units PRBC yesterday, H&H ordered. Continue pain control.   Discussed with Dr. [place]- will d/c oxycodone and start po Dilaudid to optimize pain control as long as BP remains stable. Plan for d/c to home later today.    Signed By: CAMERON Starkey

## 2021-07-26 ENCOUNTER — ANESTHESIA EVENT (OUTPATIENT)
Dept: SURGERY | Age: 21
End: 2021-07-26
Payer: COMMERCIAL

## 2021-07-26 ENCOUNTER — HOSPITAL ENCOUNTER (OUTPATIENT)
Dept: LAB | Age: 21
Discharge: HOME | End: 2021-07-26
Payer: COMMERCIAL

## 2021-07-26 NOTE — PERIOP NOTES
Recent Results (from the past 12 hour(s))   HEMOGLOBIN    Collection Time: 07/26/21  1:55 PM   Result Value Ref Range    HGB 11.8 11.7 - 15.4 g/dL

## 2021-07-27 ENCOUNTER — ANESTHESIA (OUTPATIENT)
Dept: SURGERY | Age: 21
End: 2021-07-27
Payer: COMMERCIAL

## 2021-07-27 ENCOUNTER — HOSPITAL ENCOUNTER (OUTPATIENT)
Age: 21
Setting detail: OUTPATIENT SURGERY
Discharge: HOME | End: 2021-07-27
Attending: ORTHOPAEDIC SURGERY | Admitting: ORTHOPAEDIC SURGERY
Payer: COMMERCIAL

## 2021-07-27 VITALS
SYSTOLIC BLOOD PRESSURE: 100 MMHG | BODY MASS INDEX: 20.36 KG/M2 | RESPIRATION RATE: 16 BRPM | HEART RATE: 97 BPM | DIASTOLIC BLOOD PRESSURE: 59 MMHG | OXYGEN SATURATION: 97 % | WEIGHT: 130 LBS | TEMPERATURE: 98.3 F

## 2021-07-27 DIAGNOSIS — G89.18 ACUTE POST-OPERATIVE PAIN: Primary | ICD-10-CM

## 2021-07-27 LAB — HCG UR QL: NEGATIVE

## 2021-07-27 PROCEDURE — 87205 SMEAR GRAM STAIN: CPT

## 2021-07-27 PROCEDURE — 87077 CULTURE AEROBIC IDENTIFY: CPT

## 2021-07-27 PROCEDURE — 74011250636 HC RX REV CODE- 250/636: Performed by: NURSE PRACTITIONER

## 2021-07-27 PROCEDURE — 87186 SC STD MICRODIL/AGAR DIL: CPT

## 2021-07-27 PROCEDURE — 74011000250 HC RX REV CODE- 250: Performed by: ANESTHESIOLOGY

## 2021-07-27 PROCEDURE — 76060000032 HC ANESTHESIA 0.5 TO 1 HR: Performed by: ORTHOPAEDIC SURGERY

## 2021-07-27 PROCEDURE — 87075 CULTR BACTERIA EXCEPT BLOOD: CPT

## 2021-07-27 PROCEDURE — 77030002986 HC SUT PROL J&J -A: Performed by: ORTHOPAEDIC SURGERY

## 2021-07-27 PROCEDURE — 28002 TREATMENT OF FOOT INFECTION: CPT | Performed by: ORTHOPAEDIC SURGERY

## 2021-07-27 PROCEDURE — 77030002916 HC SUT ETHLN J&J -A: Performed by: ORTHOPAEDIC SURGERY

## 2021-07-27 PROCEDURE — 76210000021 HC REC RM PH II 0.5 TO 1 HR: Performed by: ORTHOPAEDIC SURGERY

## 2021-07-27 PROCEDURE — 76010000154 HC OR TIME FIRST 0.5 HR: Performed by: ORTHOPAEDIC SURGERY

## 2021-07-27 PROCEDURE — 77030002933 HC SUT MCRYL J&J -A: Performed by: ORTHOPAEDIC SURGERY

## 2021-07-27 PROCEDURE — 77030003602 HC NDL NRV BLK BBMI -B: Performed by: ANESTHESIOLOGY

## 2021-07-27 PROCEDURE — 76210000063 HC OR PH I REC FIRST 0.5 HR: Performed by: ORTHOPAEDIC SURGERY

## 2021-07-27 PROCEDURE — 74011250637 HC RX REV CODE- 250/637: Performed by: ANESTHESIOLOGY

## 2021-07-27 PROCEDURE — 87076 CULTURE ANAEROBE IDENT EACH: CPT

## 2021-07-27 PROCEDURE — 74011250636 HC RX REV CODE- 250/636: Performed by: NURSE ANESTHETIST, CERTIFIED REGISTERED

## 2021-07-27 PROCEDURE — 87185 SC STD ENZYME DETCJ PER NZM: CPT

## 2021-07-27 PROCEDURE — 74011250636 HC RX REV CODE- 250/636: Performed by: ANESTHESIOLOGY

## 2021-07-27 PROCEDURE — 2709999900 HC NON-CHARGEABLE SUPPLY: Performed by: ORTHOPAEDIC SURGERY

## 2021-07-27 PROCEDURE — 76010010054 HC POST OP PAIN BLOCK: Performed by: ORTHOPAEDIC SURGERY

## 2021-07-27 PROCEDURE — 74011000250 HC RX REV CODE- 250: Performed by: NURSE ANESTHETIST, CERTIFIED REGISTERED

## 2021-07-27 PROCEDURE — 76942 ECHO GUIDE FOR BIOPSY: CPT | Performed by: ORTHOPAEDIC SURGERY

## 2021-07-27 PROCEDURE — 81025 URINE PREGNANCY TEST: CPT

## 2021-07-27 RX ORDER — SODIUM CHLORIDE 0.9 % (FLUSH) 0.9 %
5-40 SYRINGE (ML) INJECTION EVERY 8 HOURS
Status: DISCONTINUED | OUTPATIENT
Start: 2021-07-27 | End: 2021-07-27 | Stop reason: HOSPADM

## 2021-07-27 RX ORDER — OXYCODONE HYDROCHLORIDE 5 MG/1
10 TABLET ORAL
Status: DISCONTINUED | OUTPATIENT
Start: 2021-07-27 | End: 2021-07-27 | Stop reason: HOSPADM

## 2021-07-27 RX ORDER — CEFAZOLIN SODIUM/WATER 2 G/20 ML
2 SYRINGE (ML) INTRAVENOUS ONCE
Status: COMPLETED | OUTPATIENT
Start: 2021-07-27 | End: 2021-07-27

## 2021-07-27 RX ORDER — SODIUM CHLORIDE, SODIUM LACTATE, POTASSIUM CHLORIDE, CALCIUM CHLORIDE 600; 310; 30; 20 MG/100ML; MG/100ML; MG/100ML; MG/100ML
75 INJECTION, SOLUTION INTRAVENOUS CONTINUOUS
Status: DISCONTINUED | OUTPATIENT
Start: 2021-07-27 | End: 2021-07-27 | Stop reason: HOSPADM

## 2021-07-27 RX ORDER — OXYCODONE HYDROCHLORIDE 5 MG/1
5 TABLET ORAL
Qty: 30 TABLET | Refills: 0 | Status: SHIPPED | OUTPATIENT
Start: 2021-07-27 | End: 2021-07-27

## 2021-07-27 RX ORDER — BUPIVACAINE HYDROCHLORIDE AND EPINEPHRINE 5; 5 MG/ML; UG/ML
INJECTION, SOLUTION EPIDURAL; INTRACAUDAL; PERINEURAL
Status: COMPLETED | OUTPATIENT
Start: 2021-07-27 | End: 2021-07-27

## 2021-07-27 RX ORDER — LIDOCAINE HYDROCHLORIDE 20 MG/ML
INJECTION, SOLUTION EPIDURAL; INFILTRATION; INTRACAUDAL; PERINEURAL AS NEEDED
Status: DISCONTINUED | OUTPATIENT
Start: 2021-07-27 | End: 2021-07-27 | Stop reason: HOSPADM

## 2021-07-27 RX ORDER — MIDAZOLAM HYDROCHLORIDE 1 MG/ML
2 INJECTION, SOLUTION INTRAMUSCULAR; INTRAVENOUS ONCE
Status: DISCONTINUED | OUTPATIENT
Start: 2021-07-27 | End: 2021-07-27 | Stop reason: HOSPADM

## 2021-07-27 RX ORDER — LIDOCAINE HYDROCHLORIDE 10 MG/ML
0.1 INJECTION INFILTRATION; PERINEURAL AS NEEDED
Status: DISCONTINUED | OUTPATIENT
Start: 2021-07-27 | End: 2021-07-27 | Stop reason: HOSPADM

## 2021-07-27 RX ORDER — SODIUM CHLORIDE 0.9 % (FLUSH) 0.9 %
5-40 SYRINGE (ML) INJECTION AS NEEDED
Status: DISCONTINUED | OUTPATIENT
Start: 2021-07-27 | End: 2021-07-27 | Stop reason: HOSPADM

## 2021-07-27 RX ORDER — FAMOTIDINE 20 MG/1
20 TABLET, FILM COATED ORAL ONCE
Status: COMPLETED | OUTPATIENT
Start: 2021-07-27 | End: 2021-07-27

## 2021-07-27 RX ORDER — MIDAZOLAM HYDROCHLORIDE 1 MG/ML
2 INJECTION, SOLUTION INTRAMUSCULAR; INTRAVENOUS ONCE
Status: COMPLETED | OUTPATIENT
Start: 2021-07-27 | End: 2021-07-27

## 2021-07-27 RX ORDER — FENTANYL CITRATE 50 UG/ML
100 INJECTION, SOLUTION INTRAMUSCULAR; INTRAVENOUS ONCE
Status: COMPLETED | OUTPATIENT
Start: 2021-07-27 | End: 2021-07-27

## 2021-07-27 RX ORDER — HYDROMORPHONE HYDROCHLORIDE 2 MG/1
2 TABLET ORAL
Qty: 60 TABLET | Refills: 0 | Status: SHIPPED | OUTPATIENT
Start: 2021-07-27 | End: 2021-08-11

## 2021-07-27 RX ORDER — OXYCODONE HYDROCHLORIDE 5 MG/1
5 TABLET ORAL
Status: DISCONTINUED | OUTPATIENT
Start: 2021-07-27 | End: 2021-07-27 | Stop reason: HOSPADM

## 2021-07-27 RX ORDER — PROPOFOL 10 MG/ML
INJECTION, EMULSION INTRAVENOUS
Status: DISCONTINUED | OUTPATIENT
Start: 2021-07-27 | End: 2021-07-27 | Stop reason: HOSPADM

## 2021-07-27 RX ORDER — PROPOFOL 10 MG/ML
INJECTION, EMULSION INTRAVENOUS AS NEEDED
Status: DISCONTINUED | OUTPATIENT
Start: 2021-07-27 | End: 2021-07-27 | Stop reason: HOSPADM

## 2021-07-27 RX ORDER — HYDROMORPHONE HYDROCHLORIDE 2 MG/ML
0.5 INJECTION, SOLUTION INTRAMUSCULAR; INTRAVENOUS; SUBCUTANEOUS
Status: DISCONTINUED | OUTPATIENT
Start: 2021-07-27 | End: 2021-07-27 | Stop reason: HOSPADM

## 2021-07-27 RX ADMIN — PROPOFOL 40 MG: 10 INJECTION, EMULSION INTRAVENOUS at 07:18

## 2021-07-27 RX ADMIN — SODIUM CHLORIDE, SODIUM LACTATE, POTASSIUM CHLORIDE, AND CALCIUM CHLORIDE 75 ML/HR: 600; 310; 30; 20 INJECTION, SOLUTION INTRAVENOUS at 06:09

## 2021-07-27 RX ADMIN — PROPOFOL 160 MCG/KG/MIN: 10 INJECTION, EMULSION INTRAVENOUS at 07:18

## 2021-07-27 RX ADMIN — CEFAZOLIN 2 G: 1 INJECTION, POWDER, FOR SOLUTION INTRAVENOUS at 07:28

## 2021-07-27 RX ADMIN — FENTANYL CITRATE 100 MCG: 50 INJECTION, SOLUTION INTRAMUSCULAR; INTRAVENOUS at 06:35

## 2021-07-27 RX ADMIN — BUPIVACAINE HYDROCHLORIDE AND EPINEPHRINE BITARTRATE 40 ML: 5; .005 INJECTION, SOLUTION EPIDURAL; INTRACAUDAL; PERINEURAL at 06:40

## 2021-07-27 RX ADMIN — PROPOFOL 20 MG: 10 INJECTION, EMULSION INTRAVENOUS at 07:21

## 2021-07-27 RX ADMIN — BUPIVACAINE HYDROCHLORIDE AND EPINEPHRINE 20 ML: 5; 5 INJECTION, SOLUTION EPIDURAL; INTRACAUDAL; PERINEURAL at 06:44

## 2021-07-27 RX ADMIN — LIDOCAINE HYDROCHLORIDE 40 MG: 20 INJECTION, SOLUTION EPIDURAL; INFILTRATION; INTRACAUDAL; PERINEURAL at 07:18

## 2021-07-27 RX ADMIN — PROPOFOL 40 MG: 10 INJECTION, EMULSION INTRAVENOUS at 07:19

## 2021-07-27 RX ADMIN — FAMOTIDINE 20 MG: 20 TABLET, FILM COATED ORAL at 06:09

## 2021-07-27 RX ADMIN — MIDAZOLAM 2 MG: 1 INJECTION INTRAMUSCULAR; INTRAVENOUS at 06:35

## 2021-07-27 NOTE — H&P
Outpatient Surgery History and Physical:  Oj Davenport was seen and examined. CHIEF COMPLAINT:   Right foot. PE:     Visit Vitals  BP (!) 112/59   Pulse 95   Temp 98.3 °F (36.8 °C)   Resp 16   Wt 130 lb (59 kg)   LMP 07/08/2021   SpO2 100%   BMI 20.36 kg/m²       Heart:   Regular rhythm      Lungs:  Are clear      Past Medical History:    Patient Active Problem List    Diagnosis    Arthritis of midtarsal joint of right foot    Cellulitis of left jaw    Microcytic anemia    Menorrhagia with regular cycle    Depression     daily meds      Sepsis ([address]    Right upper lobe pulmonary infiltrate    Cholecystitis    Parent refuses immunizations    Scoliosis ([facility] by [facility] [place] [facility])    S/P foot surgery, left (surgeries for \"flat feet\" - under care of POA)       Surgical History:   Past Surgical History:   Procedure Laterality Date    HX CYST REMOVAL Right 2016    ganglion cyst right hand     HX HEENT      dental surgery for root canal and crown    HX LAP CHOLECYSTECTOMY  2018    HX ORTHOPAEDIC Left age 8    left foot. extra bone removed     HX ORTHOPAEDIC Right age 6    right foot , extra bone removed,     HX ORTHOPAEDIC Left age 15    left foot surgery with hardware    HX ORTHOPAEDIC Left age 15    left surgery surgery to remove screw    HX ORTHOPAEDIC      right ankle     HX ORTHOPAEDIC  06/2016    Left foot painful hardware with subtalar joint    HX ORTHOPAEDIC Left 2016    I&D foot       Social History: Patient  reports that she has never smoked. She has never used smokeless tobacco. She reports that she does not drink alcohol and does not use drugs.     Family History:   Family History   Problem Relation Age of Onset    No Known Problems Mother     Cancer Father         skin cancer    No Known Problems Sister     No Known Problems Brother     No Known Problems Sister     No Known Problems Sister        Allergies: Reviewed per EMR  Allergies   Allergen Reactions    Latex Other (comments)     Redness at site     Chlorhexidine Rash    Betadine [Povidone-Iodine] Hives     Itching, severe       Medications:    No current facility-administered medications on file prior to encounter. Current Outpatient Medications on File Prior to Encounter   Medication Sig    cephALEXin (KEFLEX) 500 mg capsule Take 1 Capsule by mouth four (4) times daily.  oxyCODONE IR (ROXICODONE) 5 mg immediate release tablet Take 1 Tablet by mouth every six (6) hours as needed for Pain for up to 8 days. Max Daily Amount: 20 mg.    traZODone (DESYREL) 50 mg tablet TAKE 1 TABLET BY MOUTH EVERY NIGHT AT BEDTIME    venlafaxine-SR (EFFEXOR-XR) 75 mg capsule Take 225 mg by mouth every morning.  acetaminophen (Tylenol Extra Strength) 500 mg tablet Take 500 mg by mouth every six (6) hours as needed for Pain.  hydrOXYzine HCl (ATARAX) 25 mg tablet Take 25 mg by mouth daily. The surgery is planned for the RIGHT FOOT IRRIGATION AND DEBRIDEMENT . History and physical has been reviewed. The patient has been examined. There have been no significant clinical changes since the completion of the originally dated History and Physical.  Patient identified by surgeon; surgical site was confirmed by patient and surgeon. The patient is here today for outpatient surgery. I have examined the patient, no changes are noted in the patient's medical status. Necessity for the procedure/care is still present and the history and physical above is current. See the office notes for the full long term history of the problem. Please see the recent office notes for the musculoskeletal examination.     Signed By: Peyton Balderas NP     July 27, 2021 6:58 AM

## 2021-07-27 NOTE — BRIEF OP NOTE
Brief Postoperative Note    Patient: Chastity Larson  YOB: 2000  MRN: [id number]    Date of Procedure: 7/27/2021     Pre-Op Diagnosis: Closed fracture of right foot with nonunion, subsequent encounter [A81.025F]    Post-Op Diagnosis: Same as preoperative diagnosis.       Procedure(s):  RIGHT FOOT IRRIGATION AND DEBRIDEMENT    Surgeon(s):  Kylah Taylor MD    Surgical Assistant: None    Anesthesia: Regional     Estimated Blood Loss (mL): Minimal    Complications: None    Specimens:   ID Type Source Tests Collected by Time Destination   1 : Right Foot Wound  CULTURE, ANAEROBIC, CULTURE, WOUND W Sarah Zhou MD 7/27/2021 0442 Microbiology        Implants: * No implants in log *    Drains: * No LDAs found *    Findings:     Electronically Signed by Kalina Block MD on 7/27/2021 at 7:47 AM

## 2021-07-27 NOTE — DISCHARGE INSTRUCTIONS
INSTRUCTIONS FOLLOWING FOOT SURGERY    ACTIVITY  Elevate foot. No Ice    Protected partial weight bearing on the heel only as tolerated in post op shoe after full sensation returns. Let the office know if dressing gets saturated with water . Blood clot prevention:  As instructed by Dr Lida Malhotra: Take one 325mg aspirin daily if okay with your medical doctor and you have no GI ulcer. Get up and out of bed frequently. While in bed move the legs as much as possible)    DRESSING CARE Keep dry and in place until follow up appointment. Cover with cast bag or plastic bag when showering. CALL YOUR DOCTOR IF YOU HAVE  Excessive bleeding that does not stop after holding mild pressure over the area. Temperature of 101 degrees or above. Redness, excessive swelling or bruising, and/or green or yellow, smelly discharge from incision. Loss of sensation - cold, white or blue toes. DIET  Day of Surgery: Clear liquids until no nausea or vomiting; then light, bland diet (Baked chicken, plain rice, grits, scrambled egg, toast). Nothing Greasy, fried or spicy today  Advance to regular diet on second day, unless your doctor orders otherwise. PAIN  Take pain medications as directed by your doctor. Call your doctor if pain is NOT relieved by medication. PAIN MED SIDE EFFECTS  Constipation: Lots of fluids, try prune juice, then OTC stool softeners if necessary  Nausea: Take medication with food. AFTER ANESTHESIA  For the first 24 hours and while taking narcotics for pain: DO NOT Drive, Drink Alcoholic beverages, or make important Decisions. Be aware of dizziness following anesthesia and while taking pain medication. Preventing Infection at Home  We care about preventing infection and avoiding the spread of germs - not only when you are in the hospital but also when you return home.  When you return home from the hospital, its important to take the following steps to help prevent infection and avoid spreading germs that could infect you and others. Ask everyone in your home to follow these guidelines, too. Clean Your Hands  · Clean your hands whenever your hands are visibly dirty, before you eat, before or after touching your mouth, nose or eyes, and before preparing food. Clean them after contact with body fluids, using the restroom, touching animals or changing diapers. · When washing hands, wet them with warm water and work up a lather. Rub hands for at least 15 seconds, then rinse them and pat them dry with a clean towel or paper towel. · When using hand sanitizers, it should take about 15 seconds to rub your hands dry. If not, you probably didnt apply enough . Cover Your Sneeze or Cough  Germs are released into the air whenever you sneeze or cough. To prevent the spread of infection:  · Turn away from other people before coughing or sneezing. · Cover your mouth or nose with a tissue when you cough or sneeze. Put the tissue in the trash. · If you dont have a tissue, cough or sneeze into your upper sleeve, not your hands. · Always clean your hands after coughing or sneezing. Care for Wounds  Your skin is your bodys first line of defense against germs, but an open wound leaves an easy way for germs to enter your body. To prevent infection:  · Clean your hands before and after changing wound dressings, and wear gloves to change dressings if recommended by your doctor. · Take special care with IV lines or other devices inserted into the body. If you must touch them, clean your hands first.  · Follow any specific instructions from your doctor to care for your wounds. Contact your doctor if you experience any signs of infection, such as fever or increased redness at the surgical or wound site. Keep a Clean Home  · Clean or wipe commonly touched hard surfaces like door handles, sinks, tabletops, phones and TV remotes.   · Use products labeled disinfectant to kill harmful bacteria and viruses. · Use a clean cloth or paper towel to clean and dry surfaces. Wiping surfaces with a dirty dishcloth, sponge or towel will only spread germs. · Never share toothbrushes, avelar, drinking glasses, utensils, razor blades, face cloths or bath towels to avoid spreading germs. · Be sure that the linens that you sleep on are clean. · Keep pets away from wounds and wash your hands after touching pets, their toys or bedding. We care about you and your health. Remember, preventing infections is a team effort between you, your family, friends and health care providers. DISCHARGE SUMMARY from Nurse    PATIENT INSTRUCTIONS:    After general anesthesia or intravenous sedation, for 24 hours or while taking prescription Narcotics:  · Limit your activities  · Do not drive and operate hazardous machinery  · Do not make important personal or business decisions  · Do  not drink alcoholic beverages  · If you have not urinated within 8 hours after discharge, please contact your surgeon on call. *  Please give a list of your current medications to your Primary Care Provider. *  Please update this list whenever your medications are discontinued, doses are      changed, or new medications (including over-the-counter products) are added. *  Please carry medication information at all times in case of emergency situations. These are general instructions for a healthy lifestyle:    No smoking/ No tobacco products/ Avoid exposure to second hand smoke    Surgeon General's Warning:  Quitting smoking now greatly reduces serious risk to your health.     Obesity, smoking, and sedentary lifestyle greatly increases your risk for illness    A healthy diet, regular physical exercise & weight monitoring are important for maintaining a healthy lifestyle    You may be retaining fluid if you have a history of heart failure or if you experience any of the following symptoms:  Weight gain of 3 pounds or more overnight or 5 pounds in a week, increased swelling in our hands or feet or shortness of breath while lying flat in bed. Please call your doctor as soon as you notice any of these symptoms; do not wait until your next office visit. Recognize signs and symptoms of STROKE:    F-face looks uneven    A-arms unable to move or move unevenly    S-speech slurred or non-existent    T-time-call 911 as soon as signs and symptoms begin-DO NOT go       Back to bed or wait to see if you get better-TIME IS BRAIN. Learning About How to Use Crutches  Your Care Instructions  Crutches can help you walk when you have an injured hip, leg, knee, ankle, or foot. Your doctor will tell you how much weight--if any--you can put on your leg. Be sure your crutches fit you. When you stand up in your normal posture, there should be space for two or three fingers between the top of the crutch and your armpit. When you let your hands hang down, the hand  should be at your wrists. When you put your hands on the hand , your elbows should be slightly bent. To stay safe when using crutches:  · Look straight ahead, not down at your feet. · Clear away small rugs, cords, or anything else that could cause you to trip, slip, or fall. · Be very careful around pets and small children. They can get in your path when you least expect it. · Be sure the rubber tips on your crutches are clean and in good condition to help prevent slipping. · Avoid slick conditions, such as wet floors and snowy or icy driveways. In bad weather, be especially careful on curbs and steps. How to use crutches  Getting ready to walk    1. Bend your elbows slightly. Press the padded top parts of the crutches against your sides, under your armpits. 2. If you have been told not to put any weight on your injured leg, keep that leg bent and off the ground. Walking with crutches    1. Put both crutches about 12 inches in front of you.   2. Put your weight on the handgrips, not on the pads under your arms. (Constant pressure against your underarms can cause numbness.) Swing your body forward. (If you have been told not to put any weight on your injured leg, keep that leg bent and off the ground.)  3. To complete the step, put your weight on the strong leg. 4. Move your crutches about 12 inches in front of you, and start the next step. 5. When you're confident using the crutches, you can move the crutches and your injured leg at the same time. Then push straight down on the crutches as you step past the crutches with your strong leg, as you would in normal walking. 6. Take small steps. 7. Use ramps and elevators when you can. Sitting down    1. To sit, back up to the chair. Use one hand to hold both crutches by the handgrips, beside your injured leg. With the other hand, hold onto the seat and slowly lower yourself onto the chair. 2. Lay the crutches on the ground near your chair. If you prop them up, they may fall over. Getting up from a chair    1. To get up from a chair,  the crutches and put them in one hand beside your injured leg. 2. Put your weight on the handgrips of the crutches and on your strong leg to stand up. Walking up stairs    1. To go up stairs, step up with your strong leg and then bring the crutches and your injured leg to the upper step. 2. For stairs that have handrails: Put both crutches under the arm opposite the handrail. Use the hand opposite the handrail to hold both crutches by the handgrips. 3. Hold onto the handrail as you go up. Put your strong leg on the step first when you go up. Walking down stairs    1. To go down stairs, put your crutches and injured leg on the lower step. 2. Bring your strong leg to the lower step. This saying may help you remember: \"Up with the good, down with the bad. \"  3. For stairs that have handrails: Put both crutches under the arm opposite the handrail.  Use the hand opposite the handrail to hold both crutches by the handgrips. Hold onto the handrail as you go down. Follow the same process you use for stairs: Lead with your crutches and injured leg on the way down. Follow-up care is a key part of your treatment and safety. Be sure to make and go to all appointments, and call your doctor if you are having problems. It's also a good idea to know your test results and keep a list of the medicines you take. Where can you learn more? Go to http://www.gray.com/. Enter Z650 in the search box to learn more about \"Learning About How to Use Crutches. \"  Current as of: August 4, 2016  Content Version: 11.2  © 5251-7046 [employer]. Care instructions adapted under license by [employer] (which disclaims liability or warranty for this information). If you have questions about a medical condition or this instruction, always ask your healthcare professional. [address] any warranty or liability for your use of this information.

## 2021-07-27 NOTE — ANESTHESIA POSTPROCEDURE EVALUATION
Procedure(s):  RIGHT FOOT IRRIGATION AND DEBRIDEMENT.    total IV anesthesia, general - backup    Anesthesia Post Evaluation      Multimodal analgesia: multimodal analgesia not used between 6 hours prior to anesthesia start to PACU discharge  Patient location during evaluation: PACU  Patient participation: complete - patient participated  Level of consciousness: awake and alert  Pain management: adequate  Airway patency: patent  Anesthetic complications: no  Cardiovascular status: hemodynamically stable  Respiratory status: acceptable  Hydration status: acceptable        INITIAL Post-op Vital signs:   Vitals Value Taken Time   /58 07/27/21 0756   Temp 36.8 °C (98.3 °F) 07/27/21 0747   Pulse 99 07/27/21 0757   Resp 16 07/27/21 0756   SpO2 100 % 07/27/21 0757   Vitals shown include unvalidated device data.

## 2021-07-27 NOTE — ANESTHESIA PROCEDURE NOTES
Peripheral Block    Start time: 7/27/2021 6:35 AM  End time: 7/27/2021 6:41 AM  Performed by: Dayana Bhatia MD  Authorized by: Dayana Bhatia MD       Pre-procedure:    Indications: at surgeon's request, post-op pain management and procedure for pain    Preanesthetic Checklist: patient identified, risks and benefits discussed, site marked, timeout performed, anesthesia consent given and patient being monitored    Timeout Time: 06:35 EDT          Block Type:   Block Type:  Popliteal  Laterality:  Right  Monitoring:  Standard ASA monitoring, continuous pulse ox, frequent vital sign checks, heart rate, oxygen and responsive to questions  Injection Technique:  Single shot  Procedures: ultrasound guided and nerve stimulator    Patient Position: supine  Prep: chlorhexidine    Location:  Lower thigh  Needle Type:  Stimuplex  Needle Gauge:  21 G  Needle Localization:  Ultrasound guidance and anatomical landmarks  Motor Response comment:   Motor Response: minimal motor response >0.4 mA   Medication Injected:  Bupivacaine-EPINEPHrine (PF)(SENSORCAINE) 0.5%-1:200,000 mg injection, 40 mL  Med Admin Time: 7/27/2021 6:40 AM    Assessment:  Number of attempts:  1  Injection Assessment:  Incremental injection every 5 mL, local visualized surrounding nerve on ultrasound, negative aspiration for blood, negative aspiration for CSF, no paresthesia, no intravascular symptoms and ultrasound image on chart  Patient tolerance:  Patient tolerated the procedure well with no immediate complications

## 2021-07-27 NOTE — ANESTHESIA PROCEDURE NOTES
Peripheral Block    Start time: 7/27/2021 6:42 AM  End time: 7/27/2021 6:44 AM  Performed by: Colette Jackson MD  Authorized by: Colette Jackson MD       Pre-procedure: Indications: at surgeon's request, post-op pain management and procedure for pain    Preanesthetic Checklist: patient identified, risks and benefits discussed, site marked, timeout performed, anesthesia consent given and patient being monitored      Block Type:   Block Type:   Adductor canal  Laterality:  Right  Monitoring:  Standard ASA monitoring, continuous pulse ox, frequent vital sign checks, heart rate, responsive to questions and oxygen  Injection Technique:  Single shot  Procedures: ultrasound guided    Patient Position: supine  Prep: chlorhexidine    Location:  Mid thigh  Needle Type:  Stimuplex  Needle Gauge:  21 G  Needle Localization:  Ultrasound guidance and anatomical landmarks  Medication Injected:  Bupivacaine-EPINEPHrine (PF)(SENSORCAINE) 0.5%-1:200,000 mg injection, 20 mL  Med Admin Time: 7/27/2021 6:44 AM    Assessment:  Number of attempts:  1  Injection Assessment:  Incremental injection every 5 mL, local visualized surrounding nerve on ultrasound, negative aspiration for blood, no paresthesia, no intravascular symptoms and ultrasound image on chart  Patient tolerance:  Patient tolerated the procedure well with no immediate complications

## 2021-07-27 NOTE — OP NOTES
FULL OP NOTE    PATIENT NAME: Sheeba Mccann  MRN: [id number]    DATE OF SURGERY: 7/27/2021    PREOPERATIVE DIAGNOSIS: Closed fracture of right foot with nonunion, subsequent encounter [S92.901K]      POSTOPERATIVE DIAGNOSIS: Closed fracture of right foot with nonunion, subsequent encounter [S92.901K]      PROCEDURE: Incision and drainage right foot below fascia with tendon sheath involvement, single bursal space of the foot, 66350    SURGEON: Carlie Rinne, MD    ASSISTANT: Duarte Jon NP  An assistant was required for positioning, retraction, and wound closure for this procedure. HARDWARE: * No implants in log *  INDICATIONS: This patient is a 24y.o. year old female with a history of Closed fracture of right foot with nonunion, subsequent encounter [S92.091K] who has failed conservative therapy and desires surgical treatment. Risks and benefits of the procedure including, but not limited to, anesthetic complications as well as surgical complications including damage to nerves and blood vessels, risk of infection, risk of incomplete pain relief, risk of malunion, nonunion and need for additional surgery have been discussed with the patient who wishes to proceed. PROCEDURE IN DETAIL: A time out was done to confirm the operating procedure, surgeon, patient and site. A block was placed by the department of anesthesia. During a preop surgical timeout the right lower extremity was identified as a correct surgical site and prepped and draped in a standard sterile fashion using alcohol which is lab dry completely before draping. The patient's previous sutures were removed at that time. The distal aspect of the wound was then opened. There was a hematoma identified. Cultures were obtained at that time. The hematoma was completely evacuated and some necrotic skin edges and necrotic tendon sheath were removed sharply using a #15 blade at that time.   The wound was then irrigated copiously using sterile saline and then closed using Prolene sutures. Sterile dressings applied. Anesthesia was discontinued. The patient was transferred back to recovery bed. She was taken to recovery in satisfactory condition. She appeared to tolerate the procedure well. There were no apparent surgical or anesthetic complications. All needle and sponge counts are correct. TOURNIQUET TIME: Approx 0 minutes. SPECIMENS: Intraoperative cultures    ESTIMATED BLOOD LOSS: min mL.

## 2021-07-28 NOTE — ADDENDUM NOTE
Addendum  created 07/28/21 1508 by Zan Montejo CRNA    Flowsheet accepted, Intraprocedure Flowsheets edited

## 2021-07-30 LAB
BACTERIA SPEC CULT: ABNORMAL
GRAM STN SPEC: ABNORMAL
GRAM STN SPEC: ABNORMAL
SERVICE CMNT-IMP: ABNORMAL

## 2021-08-04 ENCOUNTER — ANESTHESIA EVENT (OUTPATIENT)
Dept: SURGERY | Age: 21
DRG: 857 | End: 2021-08-04
Payer: COMMERCIAL

## 2021-08-05 ENCOUNTER — ANESTHESIA (OUTPATIENT)
Dept: SURGERY | Age: 21
DRG: 857 | End: 2021-08-05
Payer: COMMERCIAL

## 2021-08-05 ENCOUNTER — HOSPITAL ENCOUNTER (INPATIENT)
Age: 21
LOS: 6 days | Discharge: HOME HEALTH | DRG: 857 | End: 2021-08-11
Attending: ORTHOPAEDIC SURGERY | Admitting: ORTHOPAEDIC SURGERY
Payer: COMMERCIAL

## 2021-08-05 PROBLEM — L02.619 CELLULITIS AND ABSCESS OF FOOT: Status: ACTIVE | Noted: 2021-08-05

## 2021-08-05 PROBLEM — L03.119 CELLULITIS AND ABSCESS OF FOOT: Status: ACTIVE | Noted: 2021-08-05

## 2021-08-05 LAB
HCG UR QL: NEGATIVE
HCT VFR BLD AUTO: 37.2 % (ref 35.8–46.3)
HGB BLD-MCNC: 11.2 G/DL (ref 11.7–15.4)

## 2021-08-05 PROCEDURE — 87205 SMEAR GRAM STAIN: CPT

## 2021-08-05 PROCEDURE — 87077 CULTURE AEROBIC IDENTIFY: CPT

## 2021-08-05 PROCEDURE — 74011000250 HC RX REV CODE- 250: Performed by: NURSE ANESTHETIST, CERTIFIED REGISTERED

## 2021-08-05 PROCEDURE — 87186 SC STD MICRODIL/AGAR DIL: CPT

## 2021-08-05 PROCEDURE — 3E0T3BZ INTRODUCTION OF ANESTHETIC AGENT INTO PERIPHERAL NERVES AND PLEXI, PERCUTANEOUS APPROACH: ICD-10-PCS | Performed by: STUDENT IN AN ORGANIZED HEALTH CARE EDUCATION/TRAINING PROGRAM

## 2021-08-05 PROCEDURE — 76010010054 HC POST OP PAIN BLOCK: Performed by: ORTHOPAEDIC SURGERY

## 2021-08-05 PROCEDURE — 77030018717 HC DRSG GRNUFM KCON -B: Performed by: ORTHOPAEDIC SURGERY

## 2021-08-05 PROCEDURE — 85018 HEMOGLOBIN: CPT

## 2021-08-05 PROCEDURE — 74011250637 HC RX REV CODE- 250/637: Performed by: ORTHOPAEDIC SURGERY

## 2021-08-05 PROCEDURE — 74011250636 HC RX REV CODE- 250/636: Performed by: ORTHOPAEDIC SURGERY

## 2021-08-05 PROCEDURE — 0QBL0ZZ EXCISION OF RIGHT TARSAL, OPEN APPROACH: ICD-10-PCS | Performed by: ORTHOPAEDIC SURGERY

## 2021-08-05 PROCEDURE — 74011250636 HC RX REV CODE- 250/636: Performed by: STUDENT IN AN ORGANIZED HEALTH CARE EDUCATION/TRAINING PROGRAM

## 2021-08-05 PROCEDURE — 74011250636 HC RX REV CODE- 250/636: Performed by: NURSE ANESTHETIST, CERTIFIED REGISTERED

## 2021-08-05 PROCEDURE — 87075 CULTR BACTERIA EXCEPT BLOOD: CPT

## 2021-08-05 PROCEDURE — 74011250636 HC RX REV CODE- 250/636: Performed by: NURSE PRACTITIONER

## 2021-08-05 PROCEDURE — 77030003602 HC NDL NRV BLK BBMI -B: Performed by: STUDENT IN AN ORGANIZED HEALTH CARE EDUCATION/TRAINING PROGRAM

## 2021-08-05 PROCEDURE — 65270000029 HC RM PRIVATE

## 2021-08-05 PROCEDURE — 76010000161 HC OR TIME 1 TO 1.5 HR INTENSV-TIER 1: Performed by: ORTHOPAEDIC SURGERY

## 2021-08-05 PROCEDURE — 77030019952 HC CANSTR VAC ASST KCON -B: Performed by: ORTHOPAEDIC SURGERY

## 2021-08-05 PROCEDURE — 76942 ECHO GUIDE FOR BIOPSY: CPT | Performed by: ORTHOPAEDIC SURGERY

## 2021-08-05 PROCEDURE — C1713 ANCHOR/SCREW BN/BN,TIS/BN: HCPCS | Performed by: ORTHOPAEDIC SURGERY

## 2021-08-05 PROCEDURE — 20680 REMOVAL OF IMPLANT DEEP: CPT | Performed by: ORTHOPAEDIC SURGERY

## 2021-08-05 PROCEDURE — 74011000250 HC RX REV CODE- 250: Performed by: INTERNAL MEDICINE

## 2021-08-05 PROCEDURE — 74011250636 HC RX REV CODE- 250/636: Performed by: INTERNAL MEDICINE

## 2021-08-05 PROCEDURE — 81025 URINE PREGNANCY TEST: CPT

## 2021-08-05 PROCEDURE — 2709999900 HC NON-CHARGEABLE SUPPLY

## 2021-08-05 PROCEDURE — 76060000033 HC ANESTHESIA 1 TO 1.5 HR: Performed by: ORTHOPAEDIC SURGERY

## 2021-08-05 PROCEDURE — 76210000063 HC OR PH I REC FIRST 0.5 HR: Performed by: ORTHOPAEDIC SURGERY

## 2021-08-05 PROCEDURE — 77030000032 HC CUF TRNQT ZIMM -B: Performed by: ORTHOPAEDIC SURGERY

## 2021-08-05 PROCEDURE — 2709999900 HC NON-CHARGEABLE SUPPLY: Performed by: ORTHOPAEDIC SURGERY

## 2021-08-05 DEVICE — STIMULAN® RAPID CURE PROVIDED STERILE FOR SINGLE PATIENT USE. STIMULAN® RAPID CURE CONTAINS CALCIUM SULFATE POWDER AND MIXING SOLUTION IN PRE-MEASURED QUANTITIES SO THAT WHEN MIXED TOGETHER IN A STERILE MIXING BOWL, THE RESULTANT PASTE IS TO BE DIGITALLY PACKED INTO OPEN BONE VOID/GAP TO SET INSITU OR PLACED INTO THE MOULD PROVIDED, THE MIXTURE SETS TO FORM BEADS. THE BIODEGRADABLE, RADIOPAQUE BEADS ARE RESORBED IN APPROXIMATELY 30 – 60 DAYS WHEN USED IN ACCORDANCE WITH THE DEVICE LABELLING. STIMULAN® RAPID CURE IS MANUFACTURED FROM SYNTHETIC IMPLANT GRADE CALCIUM SULFATE DIHYDRATE(CASO4.2H2O) THAT RESORBS AND IS REPLACED WITH BONE DURING THE HEALING PROCESS. ALSO, AS THE BONE VOID FILLER BEADS ARE BIODEGRADABLE AND BIOCOMPATIBLE, THEY MAY BE USED AT AN INFECTED SITE.
Type: IMPLANTABLE DEVICE | Site: FOOT | Status: FUNCTIONAL
Brand: STIMULAN® RAPID CURE

## 2021-08-05 RX ORDER — NALOXONE HYDROCHLORIDE 0.4 MG/ML
0.1 INJECTION, SOLUTION INTRAMUSCULAR; INTRAVENOUS; SUBCUTANEOUS AS NEEDED
Status: DISCONTINUED | OUTPATIENT
Start: 2021-08-05 | End: 2021-08-05

## 2021-08-05 RX ORDER — TRAZODONE HYDROCHLORIDE 50 MG/1
50 TABLET ORAL
Status: DISCONTINUED | OUTPATIENT
Start: 2021-08-05 | End: 2021-08-11 | Stop reason: HOSPADM

## 2021-08-05 RX ORDER — HYDROMORPHONE HYDROCHLORIDE 2 MG/1
2 TABLET ORAL
Status: DISCONTINUED | OUTPATIENT
Start: 2021-08-05 | End: 2021-08-06

## 2021-08-05 RX ORDER — FLUMAZENIL 0.1 MG/ML
0.2 INJECTION INTRAVENOUS
Status: DISCONTINUED | OUTPATIENT
Start: 2021-08-05 | End: 2021-08-05

## 2021-08-05 RX ORDER — SODIUM CHLORIDE 0.9 % (FLUSH) 0.9 %
5-40 SYRINGE (ML) INJECTION AS NEEDED
Status: DISCONTINUED | OUTPATIENT
Start: 2021-08-05 | End: 2021-08-09

## 2021-08-05 RX ORDER — LIDOCAINE HYDROCHLORIDE 10 MG/ML
0.1 INJECTION INFILTRATION; PERINEURAL AS NEEDED
Status: DISCONTINUED | OUTPATIENT
Start: 2021-08-05 | End: 2021-08-05 | Stop reason: HOSPADM

## 2021-08-05 RX ORDER — MIDAZOLAM HYDROCHLORIDE 1 MG/ML
2 INJECTION, SOLUTION INTRAMUSCULAR; INTRAVENOUS ONCE
Status: COMPLETED | OUTPATIENT
Start: 2021-08-05 | End: 2021-08-05

## 2021-08-05 RX ORDER — CEFAZOLIN SODIUM/WATER 2 G/20 ML
2 SYRINGE (ML) INTRAVENOUS EVERY 8 HOURS
Status: DISCONTINUED | OUTPATIENT
Start: 2021-08-05 | End: 2021-08-11 | Stop reason: HOSPADM

## 2021-08-05 RX ORDER — PROPOFOL 10 MG/ML
INJECTION, EMULSION INTRAVENOUS
Status: DISCONTINUED | OUTPATIENT
Start: 2021-08-05 | End: 2021-08-05 | Stop reason: HOSPADM

## 2021-08-05 RX ORDER — SODIUM CHLORIDE 0.9 % (FLUSH) 0.9 %
5-40 SYRINGE (ML) INJECTION AS NEEDED
Status: DISCONTINUED | OUTPATIENT
Start: 2021-08-05 | End: 2021-08-05 | Stop reason: HOSPADM

## 2021-08-05 RX ORDER — ACETAMINOPHEN 500 MG
500 TABLET ORAL
Status: DISCONTINUED | OUTPATIENT
Start: 2021-08-05 | End: 2021-08-11 | Stop reason: HOSPADM

## 2021-08-05 RX ORDER — SODIUM CHLORIDE 0.9 % (FLUSH) 0.9 %
5-40 SYRINGE (ML) INJECTION EVERY 8 HOURS
Status: DISCONTINUED | OUTPATIENT
Start: 2021-08-05 | End: 2021-08-06

## 2021-08-05 RX ORDER — ROPIVACAINE HYDROCHLORIDE 5 MG/ML
INJECTION, SOLUTION EPIDURAL; INFILTRATION; PERINEURAL
Status: DISCONTINUED | OUTPATIENT
Start: 2021-08-05 | End: 2021-08-05 | Stop reason: HOSPADM

## 2021-08-05 RX ORDER — HYDROMORPHONE HYDROCHLORIDE 2 MG/ML
0.5 INJECTION, SOLUTION INTRAMUSCULAR; INTRAVENOUS; SUBCUTANEOUS
Status: DISCONTINUED | OUTPATIENT
Start: 2021-08-05 | End: 2021-08-05 | Stop reason: SDUPTHER

## 2021-08-05 RX ORDER — HYDROMORPHONE HYDROCHLORIDE 1 MG/ML
1 INJECTION, SOLUTION INTRAMUSCULAR; INTRAVENOUS; SUBCUTANEOUS
Status: DISCONTINUED | OUTPATIENT
Start: 2021-08-05 | End: 2021-08-05 | Stop reason: SDUPTHER

## 2021-08-05 RX ORDER — EPHEDRINE SULFATE/0.9% NACL/PF 50 MG/5 ML
SYRINGE (ML) INTRAVENOUS AS NEEDED
Status: DISCONTINUED | OUTPATIENT
Start: 2021-08-05 | End: 2021-08-05 | Stop reason: HOSPADM

## 2021-08-05 RX ORDER — PROPOFOL 10 MG/ML
INJECTION, EMULSION INTRAVENOUS AS NEEDED
Status: DISCONTINUED | OUTPATIENT
Start: 2021-08-05 | End: 2021-08-05 | Stop reason: HOSPADM

## 2021-08-05 RX ORDER — VANCOMYCIN HYDROCHLORIDE 1 G/20ML
INJECTION, POWDER, LYOPHILIZED, FOR SOLUTION INTRAVENOUS AS NEEDED
Status: DISCONTINUED | OUTPATIENT
Start: 2021-08-05 | End: 2021-08-05 | Stop reason: HOSPADM

## 2021-08-05 RX ORDER — HYDROXYZINE 25 MG/1
25 TABLET, FILM COATED ORAL DAILY
Status: DISCONTINUED | OUTPATIENT
Start: 2021-08-06 | End: 2021-08-11 | Stop reason: HOSPADM

## 2021-08-05 RX ORDER — OXYCODONE HYDROCHLORIDE 5 MG/1
5 TABLET ORAL
Status: DISCONTINUED | OUTPATIENT
Start: 2021-08-05 | End: 2021-08-05 | Stop reason: SDUPTHER

## 2021-08-05 RX ORDER — HYDROMORPHONE HYDROCHLORIDE 2 MG/1
2 TABLET ORAL
Status: DISCONTINUED | OUTPATIENT
Start: 2021-08-05 | End: 2021-08-05

## 2021-08-05 RX ORDER — FENTANYL CITRATE 50 UG/ML
100 INJECTION, SOLUTION INTRAMUSCULAR; INTRAVENOUS ONCE
Status: COMPLETED | OUTPATIENT
Start: 2021-08-05 | End: 2021-08-05

## 2021-08-05 RX ORDER — SODIUM CHLORIDE 0.9 % (FLUSH) 0.9 %
5-40 SYRINGE (ML) INJECTION AS NEEDED
Status: DISCONTINUED | OUTPATIENT
Start: 2021-08-05 | End: 2021-08-06

## 2021-08-05 RX ORDER — HYDROMORPHONE HYDROCHLORIDE 1 MG/ML
1 INJECTION, SOLUTION INTRAMUSCULAR; INTRAVENOUS; SUBCUTANEOUS
Status: DISCONTINUED | OUTPATIENT
Start: 2021-08-05 | End: 2021-08-06

## 2021-08-05 RX ORDER — DEXAMETHASONE SODIUM PHOSPHATE 4 MG/ML
INJECTION, SOLUTION INTRA-ARTICULAR; INTRALESIONAL; INTRAMUSCULAR; INTRAVENOUS; SOFT TISSUE
Status: DISCONTINUED | OUTPATIENT
Start: 2021-08-05 | End: 2021-08-05 | Stop reason: HOSPADM

## 2021-08-05 RX ORDER — NALOXONE HYDROCHLORIDE 0.4 MG/ML
0.1 INJECTION, SOLUTION INTRAMUSCULAR; INTRAVENOUS; SUBCUTANEOUS
Status: DISCONTINUED | OUTPATIENT
Start: 2021-08-05 | End: 2021-08-05 | Stop reason: HOSPADM

## 2021-08-05 RX ORDER — LIDOCAINE HYDROCHLORIDE 20 MG/ML
INJECTION, SOLUTION EPIDURAL; INFILTRATION; INTRACAUDAL; PERINEURAL AS NEEDED
Status: DISCONTINUED | OUTPATIENT
Start: 2021-08-05 | End: 2021-08-05 | Stop reason: HOSPADM

## 2021-08-05 RX ORDER — ENOXAPARIN SODIUM 100 MG/ML
40 INJECTION SUBCUTANEOUS EVERY 24 HOURS
Status: DISCONTINUED | OUTPATIENT
Start: 2021-08-05 | End: 2021-08-11 | Stop reason: HOSPADM

## 2021-08-05 RX ORDER — SODIUM CHLORIDE 0.9 % (FLUSH) 0.9 %
5-40 SYRINGE (ML) INJECTION EVERY 8 HOURS
Status: DISCONTINUED | OUTPATIENT
Start: 2021-08-05 | End: 2021-08-05 | Stop reason: HOSPADM

## 2021-08-05 RX ORDER — SODIUM CHLORIDE, SODIUM LACTATE, POTASSIUM CHLORIDE, CALCIUM CHLORIDE 600; 310; 30; 20 MG/100ML; MG/100ML; MG/100ML; MG/100ML
100 INJECTION, SOLUTION INTRAVENOUS CONTINUOUS
Status: DISCONTINUED | OUTPATIENT
Start: 2021-08-05 | End: 2021-08-09

## 2021-08-05 RX ORDER — CEFAZOLIN SODIUM/WATER 2 G/20 ML
2 SYRINGE (ML) INTRAVENOUS ONCE
Status: COMPLETED | OUTPATIENT
Start: 2021-08-05 | End: 2021-08-05

## 2021-08-05 RX ORDER — ONDANSETRON 2 MG/ML
4 INJECTION INTRAMUSCULAR; INTRAVENOUS
Status: DISCONTINUED | OUTPATIENT
Start: 2021-08-05 | End: 2021-08-11 | Stop reason: HOSPADM

## 2021-08-05 RX ORDER — DIPHENHYDRAMINE HYDROCHLORIDE 50 MG/ML
12.5 INJECTION, SOLUTION INTRAMUSCULAR; INTRAVENOUS
Status: DISCONTINUED | OUTPATIENT
Start: 2021-08-05 | End: 2021-08-05

## 2021-08-05 RX ORDER — SODIUM CHLORIDE, SODIUM LACTATE, POTASSIUM CHLORIDE, CALCIUM CHLORIDE 600; 310; 30; 20 MG/100ML; MG/100ML; MG/100ML; MG/100ML
100 INJECTION, SOLUTION INTRAVENOUS CONTINUOUS
Status: DISCONTINUED | OUTPATIENT
Start: 2021-08-05 | End: 2021-08-05 | Stop reason: HOSPADM

## 2021-08-05 RX ORDER — SODIUM CHLORIDE 0.9 % (FLUSH) 0.9 %
5-40 SYRINGE (ML) INJECTION EVERY 8 HOURS
Status: DISCONTINUED | OUTPATIENT
Start: 2021-08-05 | End: 2021-08-10

## 2021-08-05 RX ORDER — VENLAFAXINE HYDROCHLORIDE 75 MG/1
225 CAPSULE, EXTENDED RELEASE ORAL
Status: DISCONTINUED | OUTPATIENT
Start: 2021-08-06 | End: 2021-08-11 | Stop reason: HOSPADM

## 2021-08-05 RX ORDER — NALOXONE HYDROCHLORIDE 0.4 MG/ML
0.4 INJECTION, SOLUTION INTRAMUSCULAR; INTRAVENOUS; SUBCUTANEOUS AS NEEDED
Status: DISCONTINUED | OUTPATIENT
Start: 2021-08-05 | End: 2021-08-11 | Stop reason: HOSPADM

## 2021-08-05 RX ADMIN — HYDROMORPHONE HYDROCHLORIDE 2 MG: 2 TABLET ORAL at 19:16

## 2021-08-05 RX ADMIN — HYDROMORPHONE HYDROCHLORIDE 2 MG: 2 TABLET ORAL at 14:31

## 2021-08-05 RX ADMIN — Medication 10 ML: at 21:31

## 2021-08-05 RX ADMIN — ROPIVACAINE HYDROCHLORIDE 15 ML: 5 INJECTION, SOLUTION EPIDURAL; INFILTRATION; PERINEURAL at 09:55

## 2021-08-05 RX ADMIN — LIDOCAINE HYDROCHLORIDE 20 MG: 20 INJECTION, SOLUTION EPIDURAL; INFILTRATION; INTRACAUDAL; PERINEURAL at 10:52

## 2021-08-05 RX ADMIN — HYDROMORPHONE HYDROCHLORIDE 1 MG: 1 INJECTION, SOLUTION INTRAMUSCULAR; INTRAVENOUS; SUBCUTANEOUS at 21:30

## 2021-08-05 RX ADMIN — HYDROMORPHONE HYDROCHLORIDE 1 MG: 1 INJECTION, SOLUTION INTRAMUSCULAR; INTRAVENOUS; SUBCUTANEOUS at 16:43

## 2021-08-05 RX ADMIN — Medication 10 ML: at 21:30

## 2021-08-05 RX ADMIN — Medication 10 MG: at 11:38

## 2021-08-05 RX ADMIN — MEPIVACAINE HYDROCHLORIDE 5 ML: 15 INJECTION, SOLUTION EPIDURAL; INFILTRATION at 09:55

## 2021-08-05 RX ADMIN — SODIUM CHLORIDE, SODIUM LACTATE, POTASSIUM CHLORIDE, AND CALCIUM CHLORIDE 100 ML/HR: 600; 310; 30; 20 INJECTION, SOLUTION INTRAVENOUS at 23:51

## 2021-08-05 RX ADMIN — FENTANYL CITRATE 100 MCG: 50 INJECTION, SOLUTION INTRAMUSCULAR; INTRAVENOUS at 09:49

## 2021-08-05 RX ADMIN — SODIUM CHLORIDE, SODIUM LACTATE, POTASSIUM CHLORIDE, AND CALCIUM CHLORIDE 100 ML/HR: 600; 310; 30; 20 INJECTION, SOLUTION INTRAVENOUS at 14:31

## 2021-08-05 RX ADMIN — DEXAMETHASONE SODIUM PHOSPHATE 2 MG: 4 INJECTION, SOLUTION INTRA-ARTICULAR; INTRALESIONAL; INTRAMUSCULAR; INTRAVENOUS; SOFT TISSUE at 09:58

## 2021-08-05 RX ADMIN — CEFAZOLIN 2 G: 1 INJECTION, POWDER, FOR SOLUTION INTRAVENOUS at 10:42

## 2021-08-05 RX ADMIN — ENOXAPARIN SODIUM 40 MG: 40 INJECTION SUBCUTANEOUS at 14:31

## 2021-08-05 RX ADMIN — CEFAZOLIN SODIUM 2 G: 100 INJECTION, POWDER, LYOPHILIZED, FOR SOLUTION INTRAVENOUS at 19:17

## 2021-08-05 RX ADMIN — HYDROMORPHONE HYDROCHLORIDE 2 MG: 2 TABLET ORAL at 23:48

## 2021-08-05 RX ADMIN — DEXAMETHASONE SODIUM PHOSPHATE 2 MG: 4 INJECTION, SOLUTION INTRAMUSCULAR; INTRAVENOUS at 09:55

## 2021-08-05 RX ADMIN — PROPOFOL 160 MCG/KG/MIN: 10 INJECTION, EMULSION INTRAVENOUS at 10:53

## 2021-08-05 RX ADMIN — TRAZODONE HYDROCHLORIDE 50 MG: 50 TABLET ORAL at 21:30

## 2021-08-05 RX ADMIN — MIDAZOLAM 2 MG: 1 INJECTION INTRAMUSCULAR; INTRAVENOUS at 09:49

## 2021-08-05 RX ADMIN — PROPOFOL 50 MG: 10 INJECTION, EMULSION INTRAVENOUS at 10:52

## 2021-08-05 RX ADMIN — SODIUM CHLORIDE, SODIUM LACTATE, POTASSIUM CHLORIDE, AND CALCIUM CHLORIDE 100 ML/HR: 600; 310; 30; 20 INJECTION, SOLUTION INTRAVENOUS at 09:49

## 2021-08-05 RX ADMIN — ROPIVACAINE HYDROCHLORIDE 15 ML: 5 INJECTION, SOLUTION EPIDURAL; INFILTRATION; PERINEURAL at 09:58

## 2021-08-05 RX ADMIN — Medication 5 ML: at 14:33

## 2021-08-05 NOTE — OP NOTES
FULL OP NOTE    PATIENT NAME: Michelle Bee  MRN: [id number]    DATE OF SURGERY: 8/5/2021    PREOPERATIVE DIAGNOSIS: Cellulitis of right lower extremity [L03.115]      POSTOPERATIVE DIAGNOSIS: Cellulitis of right lower extremity [W36.621]      PROCEDURE: Incision and drainage of right foot wound with debridement of bone, removal of hardware, and placement of absorbable antibiotic beads and negative wound pressure therapy, 47925    SURGEON: Alberto Pike MD    HARDWARE:   Implant Name Type Inv. Item Serial No.  Lot No. LRB No. Used Action   GRAFT BNE SUB 5CC CA SULF STIMULAN RAP CURE - [id number]  GRAFT BNE SUB 5CC CA SULF STIMULAN RAP CURE  [employer] [id number] 1 Implanted     INDICATIONS: This patient is a 24y.o. year old female with a history of Cellulitis of right lower extremity [L03.115] who has failed conservative therapy and desires surgical treatment. Risks and benefits of the procedure including, but not limited to, anesthetic complications as well as surgical complications including damage to nerves and blood vessels, risk of infection, risk of incomplete pain relief, risk of malunion, nonunion and need for additional surgery have been discussed with the patient who wishes to proceed. PROCEDURE IN DETAIL: A time out was done to confirm the operating procedure, surgeon, patient and site. A block was placed by the department of anesthesia. In a preop surgical timeout the right lower extremity was identified as a correct surgical site and prepped draped in a standard sterile fashion using alcohol based solution. The patient's previous incision was and opened. It was immediately realized that the underlying hardware was exposed in the area of the wound. Cultures were obtained. Secondary to exposed hardware all the exposed hardware was then removed at that time consisting of 3 staples.   Additionally the patient's allograft was also debrided at that time back to bleeding healthy bone. To fill in the gap absorbable OsteoSet beads were placed which were mixed with vancomycin powder. The wound was then closed proximally using Prolene sutures. The distal aspect as needed would be closed. A wound VAC was in place with good seal noted in the distal aspect of the wound. Anesthesia was discontinued. The patient was transferred back to recovery bed. She was taken recovery in satisfactory condition. She appeared to tolerate the procedure well. There were no apparent trouble or anesthetic complications. All needle and sponge counts are correct. TOURNIQUET TIME: Approx 32 minutes. SPECIMENS: none    ESTIMATED BLOOD LOSS: min mL. Postoperatively the plan is for admission to the hospital placed on parenteral antibiotics. Infectious disease will be consulted for future management based on her intraoperative cultures. Additionally she will benefit from a wound VAC at home which we will try to get arranged with case management to have home health come and change the wound VAC every 3 days.     Wound dimensions: Lateral foot wound which is left open is 4 cm x 2 cm and is 3 cm deep with no tunneling

## 2021-08-05 NOTE — ANESTHESIA PREPROCEDURE EVALUATION
Anesthetic History     PONV          Review of Systems / Medical History  Patient summary reviewed and pertinent labs reviewed    Pulmonary  Within defined limits                 Neuro/Psych         Psychiatric history (Depression)     Cardiovascular                  Exercise tolerance: >4 METS  Comments: Denies CP, SOB or changes in functional status   GI/Hepatic/Renal  Within defined limits              Endo/Other  Within defined limits           Other Findings   Comments: CRPS LLE           Physical Exam    Airway  Mallampati: II  TM Distance: 4 - 6 cm  Neck ROM: normal range of motion   Mouth opening: Normal     Cardiovascular    Rhythm: regular  Rate: normal         Dental  No notable dental hx       Pulmonary  Breath sounds clear to auscultation               Abdominal  GI exam deferred       Other Findings            Anesthetic Plan    ASA: 2  Anesthesia type: total IV anesthesia and general - backup      Post-op pain plan if not by surgeon: peripheral nerve block single    Induction: Intravenous  Anesthetic plan and risks discussed with: Patient and Family

## 2021-08-05 NOTE — CONSULTS
Infectious Disease Consult    Today's Date: 8/5/2021   Admit Date: 8/5/2021    Impression:   · MSSA R foot HW related infection s/p debridement 7/27, debridement with [facility] 8/5  -post burkett procedure several years ago   · Depression     Plan:   ·  Continue Cefazolin for now. Duration pending the depth of infection. Seems HW still on medial aspect? · Follow OP cx. Also anaerobic GPC sent to reference lab    Anti-infectives:   · Keflex 7/27-  · Cefazolin 8/5-    Subjective:   Date of Consultation:  August 5, 2021  Referring Physician: Sonu Santiago     Patient is a 24 y.o. female with medical hx for depression and Burkett lateral column lengthening procedure (several years ago) who presented for operative revision due to ongoing complications. She has had issues of chronic pain and non-union so in July 8 2021 Dr Sonu Santiago performed calcaneocuboid joint arthrodesis. Then developed hematoma and cellulitis. On 7/27, debridement was done and OP cx with MSSA/anaerobic GPC. Latest OR visit done today with [facility]. OP cx pending. Now on cefazolin. Lives with parents in [facility] [place]. Patient Active Problem List   Diagnosis Code    Parent refuses immunizations Z28.82    Scoliosis (managed by [facility]) M41.9    S/P foot surgery, left (surgeries for \"flat feet\" - under care of POA) Z98.890    Cholecystitis K81.9    Cellulitis of left jaw L03. 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    Cellulitis and abscess of foot L03.119, L02.619     Past Medical History:   Diagnosis Date    Anemia 2019    2 units PRBC's - abscess in jaw    Anxiety     no longer taking meds per pt's mother    CRPS (complex regional pain syndrome)     Depression     daily meds    Gall bladder disease     gall bladder removal     GERD (gastroesophageal reflux disease) resolved per patient- no current meds 2/8/21    Left foot pain     PONV (postoperative nausea and vomiting)     with first oral surgery under general- none since      Family History   Problem Relation Age of Onset    No Known Problems Mother     Cancer Father         skin cancer    No Known Problems Sister     No Known Problems Brother     No Known Problems Sister     No Known Problems Sister       Social History     Tobacco Use    Smoking status: Never Smoker    Smokeless tobacco: Never Used   Substance Use Topics    Alcohol use: No     Past Surgical History:   Procedure Laterality Date    HX CYST REMOVAL Right 2016    ganglion cyst right hand     HX HEENT      dental surgery for root canal and crown    HX LAP CHOLECYSTECTOMY  2018    HX ORTHOPAEDIC Left age 8    left foot. extra bone removed     HX ORTHOPAEDIC Right age 6    right foot , extra bone removed,     HX ORTHOPAEDIC Left age 15    left foot surgery with hardware    HX ORTHOPAEDIC Left age 15    left surgery surgery to remove screw    HX ORTHOPAEDIC      right ankle     HX ORTHOPAEDIC  06/2016    Left foot painful hardware with subtalar joint    HX ORTHOPAEDIC Left 2016    I&D foot      Prior to Admission medications    Medication Sig Start Date End Date Taking? Authorizing Provider   HYDROmorphone (Dilaudid) 2 mg tablet Take 1 Tablet by mouth every four (4) hours as needed for Pain for up to 10 days. Max Daily Amount: 12 mg. 1-2 tabs 7/27/21 8/6/21 Yes Sylvester Gold, NP   cephALEXin [facility]) 500 mg capsule Take 1 Capsule by mouth four (4) times daily. 7/21/21  Yes Shama Baez MD   traZODone (DESYREL) 50 mg tablet TAKE 1 TABLET BY MOUTH EVERY NIGHT AT BEDTIME 3/11/21  Yes Provider, Historical   venlafaxine-SR (EFFEXOR-XR) 75 mg capsule Take 225 mg by mouth every morning. Yes Provider, Historical   acetaminophen (Tylenol Extra Strength) 500 mg tablet Take 500 mg by mouth every six (6) hours as needed for Pain.    Yes Provider, Historical   hydrOXYzine HCl (ATARAX) 25 mg tablet Take 25 mg by mouth daily. Yes Other, MD Martha       Allergies   Allergen Reactions    Latex Other (comments)     Redness at site     Chlorhexidine Rash    Betadine [Povidone-Iodine] Hives     Itching, severe    Hydrocodone Itching        Review of Systems:  A comprehensive review of systems was negative except for that written in the History of Present Illness. Objective:     Visit Vitals  BP (!) 103/58   Pulse (!) 104   Temp 97.5 °F (36.4 °C)   Resp 16   Wt 59 kg (130 lb)   SpO2 95%   BMI 20.36 kg/m²     Temp (24hrs), Av.2 °F (36.8 °C), Min:97.5 °F (36.4 °C), Max:99.2 °F (37.3 °C)       Lines:  Peripheral IV:       Physical Exam:    General:  Alert, cooperative, well noursished, well developed, appears stated age   Eyes:  Sclera anicteric. Pupils equally round and reactive to light. Mouth/Throat: Mucous membranes normal, oral pharynx clear   Neck: Supple   Lungs:   Clear to auscultation bilaterally, good effort   CV:  Regular rate and rhythm,no murmur, click, rub or gallop   Abdomen:   Soft, non-tender. bowel sounds normal. non-distended   Extremities: No cyanosis or edema   Skin: LEs with flea bites per pt    Lymph nodes: Cervical and supraclavicular normal   Musculoskeletal: R lateral heel area with VAC   Lines/Devices:  Intact, no erythema, drainage or tenderness   Psych: Alert and oriented, normal mood affect given the setting       Data Review:     CBC:  Recent Labs     21  0926   HGB 11.2*   HCT 37.2       BMP:  No results for input(s): CREA, BUN, NA, K, CL, CO2, AGAP, GLU in the last 72 hours. LFTS:  No results for input(s): TBILI, ALT, AP, TP, ALB in the last 72 hours.     No lab exists for component: SGOT    Microbiology:     All Micro Results     Procedure Component Value Units Date/Time    CULTURE, ANAEROBIC [[id number]] Collected: 21 1106    Order Status: Completed Specimen: Foot, Right Updated: 21 0623 [place]ett  STAIN [[id number]] Collected: 08/05/21 1106    Order Status: Completed Specimen: Wound from Foot Updated: 08/05/21 1304          Imaging:   See HPI/EPIC     Signed By: Pretty Duncan NP     August 5, 2021

## 2021-08-05 NOTE — PERIOP NOTES
TRANSFER - OUT REPORT:    Verbal report given to [facility] on Wilmer Larsen  being transferred to [address] [place] [address] for routine post - op       Report consisted of patients Situation, Background, Assessment and   Recommendations(SBAR). Information from the following report(s) OR Summary, Procedure Summary, Intake/Output and MAR was reviewed with the receiving nurse. Lines:   Peripheral IV 08/05/21 Anterior;Right Wrist (Active)   Site Assessment Clean, dry, & intact 08/05/21 1151   Phlebitis Assessment 0 08/05/21 1151   Infiltration Assessment 0 08/05/21 1151   Dressing Status Clean, dry, & intact 08/05/21 1151   Dressing Type Tape;Transparent 08/05/21 1151   Hub Color/Line Status Green; Infusing 08/05/21 1151        Opportunity for questions and clarification was provided. Patient transported with:   O2 @ 0 liters    VTE prophylaxis orders have not been written for Wilmer Larsen. Patient and family given floor number and nurses name. Family updated re: pt status after security code verified.

## 2021-08-05 NOTE — ANESTHESIA PROCEDURE NOTES
Peripheral Block    Start time: 8/5/2021 9:56 AM  End time: 8/5/2021 9:58 AM  Performed by: Kenia Blake MD  Authorized by: Kenia Blake MD       Pre-procedure: Indications: at surgeon's request and post-op pain management    Preanesthetic Checklist: patient identified, risks and benefits discussed, site marked, timeout performed, anesthesia consent given and patient being monitored    Timeout Time: 09:56 EDT          Block Type:   Block Type:   Adductor canal  Laterality:  Right  Monitoring:  Standard ASA monitoring, responsive to questions, oxygen, continuous pulse ox, frequent vital sign checks and heart rate  Injection Technique:  Single shot  Procedures: ultrasound guided    Patient Position: supine  Prep: alcohol    Location:  Lower thigh  Needle Type:  Stimuplex  Needle Gauge:  20 G  Needle Localization:  Ultrasound guidance  Medication Injected:  Ropivacaine (PF) (NAROPIN)(0.5%) 5 mg/mL injection, 15 mL  mepivacaine PF (CARBOCAINE) 1.5 % injection, 5 mL  dexamethasone (DECADRON) 4 mg/mL injection, 2 mg  Med Admin Time: 8/5/2021 9:58 AM    Assessment:  Number of attempts:  1  Injection Assessment:  Incremental injection every 5 mL, negative aspiration for CSF, no paresthesia, ultrasound image on chart, local visualized surrounding nerve on ultrasound, negative aspiration for blood and no intravascular symptoms  Patient tolerance:  Patient tolerated the procedure well with no immediate complications

## 2021-08-05 NOTE — ANESTHESIA PROCEDURE NOTES
Peripheral Block    Start time: 8/5/2021 9:49 AM  End time: 8/5/2021 9:55 AM  Performed by: Glo Acosta MD  Authorized by: Glo Acosta MD       Pre-procedure: Indications: at surgeon's request and post-op pain management    Preanesthetic Checklist: patient identified, risks and benefits discussed, site marked, timeout performed, anesthesia consent given and patient being monitored    Timeout Time: 09:49 EDT          Block Type:   Block Type:  Popliteal  Laterality:  Right  Monitoring:  Standard ASA monitoring, responsive to questions, oxygen, continuous pulse ox, frequent vital sign checks and heart rate  Injection Technique:  Single shot  Procedures: ultrasound guided    Patient Position: supine  Prep: alcohol    Location:  Lower thigh  Needle Type:  Stimuplex  Needle Gauge:  20 G  Needle Localization:  Ultrasound guidance  Medication Injected:  Ropivacaine (PF) (NAROPIN)(0.5%) 5 mg/mL injection, 15 mL  mepivacaine PF (CARBOCAINE) 1.5 % injection, 5 mL  dexamethasone (DECADRON) 4 mg/mL injection, 2 mg  Med Admin Time: 8/5/2021 9:55 AM    Assessment:  Number of attempts:  1  Injection Assessment:  Incremental injection every 5 mL, negative aspiration for CSF, no paresthesia, local visualized surrounding nerve on ultrasound, ultrasound image on chart, negative aspiration for blood and no intravascular symptoms  Patient tolerance:  Patient tolerated the procedure well with no immediate complications  Chlorhexidine was touched to the skin briefly and then immediately cleaned thoroughly with alcohol prior to block being performed. Adductor block alcohol used for prep.

## 2021-08-05 NOTE — BRIEF OP NOTE
Brief Postoperative Note    Patient: Wilmer Larsen  YOB: 2000  MRN: [id number]    Date of Procedure: 8/5/2021     Pre-Op Diagnosis: Cellulitis of right lower extremity [L03.115]    Post-Op Diagnosis: Same as preoperative diagnosis. Procedure(s):  INCISION AND DRAINAGE RIGHT FOOT  AND REMOVAL OF HARDWARE, WITH ANTIBIOTIC BEADS  AND WOUND VAC PLACEMENT    Surgeon(s):  María Virgen MD    Surgical Assistant: None    Anesthesia: Regional     Estimated Blood Loss (mL): Minimal    Complications: None    Specimens:   ID Type Source Tests Collected by Time Destination   1 : Lateral right foot wound Wound Foot, Right CULTURE, ANAEROBIC, CULTURE, WOUND W Sahun Su MD 8/5/2021 1106 Microbiology        Implants:   Implant Name Type Inv.  Item Serial No.  Lot No. LRB No. Used Action   GRAFT BNE SUB 5CC CA SULF STIMULAN RAP CURE - [id number]  GRAFT BNE SUB 5CC CA SULF STIMULAN RAP CURE  [employer] [id number] 1 Implanted       Drains: * No LDAs found *    Findings:     Electronically Signed by Carli Ackerman MD on 8/5/2021 at 11:45 AM

## 2021-08-05 NOTE — PERIOP NOTES
WOUND VAC WAS PLACED AND IT IS DRAINING BRIGHT RED BLOOD. DR. Isa Schmitz WAS NOTIFIED AND CAME BACK  IN TO OBSERVE THE DRAINAGE. DR. Isa Schmitz SAID IT IS FINE AND NO ARTERY WAS DAMAGE AND WILL CONTINUE TO OBSERVE IN RECOVERY. THE SUCTION WAS CHANGE TO 50MMHG BY SURGEON. NO FURTHER CHANGE INTRAOP.

## 2021-08-05 NOTE — PROGRESS NOTES
TRANSFER - IN REPORT:    Verbal report received from Joe RN(name) on Barron Gosselin  being received from pacu(unit) for routine post - op      Report consisted of patients Situation, Background, Assessment and   Recommendations(SBAR). Information from the following report(s) SBAR, Kardex, Procedure Summary, Intake/Output, MAR and Recent Results was reviewed with the receiving nurse. Opportunity for questions and clarification was provided. Assessment completed upon patients arrival to unit and care assumed.

## 2021-08-05 NOTE — ANESTHESIA POSTPROCEDURE EVALUATION
Procedure(s):  INCISION AND DRAINAGE RIGHT FOOT  AND REMOVAL OF HARDWARE, WITH ANTIBIOTIC BEADS  AND WOUND VAC PLACEMENT.    total IV anesthesia, general - backup    Anesthesia Post Evaluation      Multimodal analgesia: multimodal analgesia used between 6 hours prior to anesthesia start to PACU discharge  Patient location during evaluation: bedside  Patient participation: complete - patient participated  Level of consciousness: awake and alert  Pain management: adequate  Airway patency: patent  Anesthetic complications: no  Cardiovascular status: acceptable  Respiratory status: acceptable  Hydration status: acceptable  Comments: Patient comfortable in recovery. Was tachycardic around 110 in holding as well. Post anesthesia nausea and vomiting:  controlled  Final Post Anesthesia Temperature Assessment:  Normothermia (36.0-37.5 degrees C)      INITIAL Post-op Vital signs:   Vitals Value Taken Time   /66 08/05/21 1236   Temp 36.6 °C (97.8 °F) 08/05/21 1153   Pulse 106 08/05/21 1237   Resp 16 08/05/21 1206   SpO2 98 % 08/05/21 1237   Vitals shown include unvalidated device data.

## 2021-08-05 NOTE — PROGRESS NOTES
08/05/21 1410   Dual Skin Pressure Injury Assessment   Dual Skin Pressure Injury Assessment WDL   Second Care Provider (Based on [address] [place] [address]) Evelyn Samuels RN   Skin Integumentary   Skin Integumentary (WDL) X   Skin Integrity Incision (comment)   Wound Prevention and Protection Methods   Orientation of Wound Prevention Posterior   Location of Wound Prevention Sacrum/Coccyx   Wound Offloading (Prevention Methods) Bed, pressure reduction mattress   Pt has wound vac to right foot.  Pt is covered BLE and multiple BUE flea bites

## 2021-08-05 NOTE — H&P
Outpatient Surgery History and Physical:  [place] Potter was seen and examined. 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    Cellulitis of left jaw    Microcytic anemia    Menorrhagia with regular cycle    Depression     daily meds      Sepsis ([address]    Right upper lobe pulmonary infiltrate    Cholecystitis    Parent refuses immunizations    Scoliosis (managed by [facility])    S/P foot surgery, left (surgeries for \"flat feet\" - under care of POA)       Surgical History:   Past Surgical History:   Procedure Laterality Date    HX CYST REMOVAL Right 2016    ganglion cyst right hand     HX HEENT      dental surgery for root canal and crown    HX LAP CHOLECYSTECTOMY  2018    HX ORTHOPAEDIC Left age 8    left foot. extra bone removed     HX ORTHOPAEDIC Right age 6    right foot , extra bone removed,     HX ORTHOPAEDIC Left age 15    left foot surgery with hardware    HX ORTHOPAEDIC Left age 15    left surgery surgery to remove screw    HX ORTHOPAEDIC      right ankle     HX ORTHOPAEDIC  06/2016    Left foot painful hardware with subtalar joint    HX ORTHOPAEDIC Left 2016    I&D foot       Social History: Patient  reports that she has never smoked. She has never used smokeless tobacco. She reports that she does not drink alcohol and does not use drugs.     Family History:   Family History   Problem Relation Age of Onset    No Known Problems Mother     Cancer Father         skin cancer    No Known Problems Sister     No Known Problems Brother     No Known Problems Sister     No Known Problems Sister        Allergies: Reviewed per EMR  Allergies   Allergen Reactions    Latex Other (comments)     Redness at site     Chlorhexidine Rash    Betadine [Povidone-Iodine] Hives     Itching, severe  Hydrocodone Itching       Medications:    No current facility-administered medications on file prior to encounter. Current Outpatient Medications on File Prior to Encounter   Medication Sig    HYDROmorphone (Dilaudid) 2 mg tablet Take 1 Tablet by mouth every four (4) hours as needed for Pain for up to 10 days. Max Daily Amount: 12 mg. 1-2 tabs    cephALEXin (KEFLEX) 500 mg capsule Take 1 Capsule by mouth four (4) times daily.  traZODone (DESYREL) 50 mg tablet TAKE 1 TABLET BY MOUTH EVERY NIGHT AT BEDTIME    venlafaxine-SR (EFFEXOR-XR) 75 mg capsule Take 225 mg by mouth every morning.  acetaminophen (Tylenol Extra Strength) 500 mg tablet Take 500 mg by mouth every six (6) hours as needed for Pain.  hydrOXYzine HCl (ATARAX) 25 mg tablet Take 25 mg by mouth daily. The surgery is planned for the .  Procedure: INCISION AND DRAINAGE RIGHT FOOT/ CHOICE        History and physical has been reviewed. The patient has been examined. There have been no significant clinical changes since the completion of the originally dated History and Physical.  Patient identified by surgeon; surgical site was confirmed by patient and surgeon. The patient is here today for outpatient surgery. I have examined the patient, no changes are noted in the patient's medical status. Necessity for the procedure/care is still present and the history and physical above is current. See the office notes for the full long term history of the problem. Please see the recent office notes for the musculoskeletal examination.     Signed By: Juan Pablo Taylor MD     August 5, 2021 9:15 AM

## 2021-08-06 PROCEDURE — 77030019934 HC DRSG VAC ASST KCON -B

## 2021-08-06 PROCEDURE — 74011250636 HC RX REV CODE- 250/636: Performed by: INTERNAL MEDICINE

## 2021-08-06 PROCEDURE — 65270000029 HC RM PRIVATE

## 2021-08-06 PROCEDURE — 74011000250 HC RX REV CODE- 250: Performed by: INTERNAL MEDICINE

## 2021-08-06 PROCEDURE — 74011250636 HC RX REV CODE- 250/636: Performed by: ORTHOPAEDIC SURGERY

## 2021-08-06 PROCEDURE — 74011250637 HC RX REV CODE- 250/637: Performed by: ORTHOPAEDIC SURGERY

## 2021-08-06 PROCEDURE — 36573 INSJ PICC RS&I 5 YR+: CPT | Performed by: NURSE PRACTITIONER

## 2021-08-06 PROCEDURE — 97605 NEG PRS WND THER DME<=50SQCM: CPT

## 2021-08-06 RX ORDER — OXYCODONE HYDROCHLORIDE 5 MG/1
5 TABLET ORAL
Status: DISCONTINUED | OUTPATIENT
Start: 2021-08-06 | End: 2021-08-11 | Stop reason: HOSPADM

## 2021-08-06 RX ADMIN — HYDROMORPHONE HYDROCHLORIDE 1 MG: 1 INJECTION, SOLUTION INTRAMUSCULAR; INTRAVENOUS; SUBCUTANEOUS at 10:47

## 2021-08-06 RX ADMIN — CEFAZOLIN SODIUM 2 G: 100 INJECTION, POWDER, LYOPHILIZED, FOR SOLUTION INTRAVENOUS at 03:14

## 2021-08-06 RX ADMIN — Medication 5 ML: at 06:07

## 2021-08-06 RX ADMIN — VENLAFAXINE HYDROCHLORIDE 225 MG: 75 CAPSULE, EXTENDED RELEASE ORAL at 06:06

## 2021-08-06 RX ADMIN — MEPERIDINE HYDROCHLORIDE 50 MG: 50 INJECTION INTRAMUSCULAR; INTRAVENOUS; SUBCUTANEOUS at 14:55

## 2021-08-06 RX ADMIN — Medication 10 ML: at 13:38

## 2021-08-06 RX ADMIN — CEFAZOLIN SODIUM 2 G: 100 INJECTION, POWDER, LYOPHILIZED, FOR SOLUTION INTRAVENOUS at 18:10

## 2021-08-06 RX ADMIN — OXYCODONE HYDROCHLORIDE 5 MG: 5 TABLET ORAL at 17:00

## 2021-08-06 RX ADMIN — OXYCODONE HYDROCHLORIDE 5 MG: 5 TABLET ORAL at 21:32

## 2021-08-06 RX ADMIN — Medication 5 ML: at 21:35

## 2021-08-06 RX ADMIN — HYDROXYZINE HYDROCHLORIDE 25 MG: 25 TABLET, FILM COATED ORAL at 08:11

## 2021-08-06 RX ADMIN — HYDROMORPHONE HYDROCHLORIDE 1 MG: 1 INJECTION, SOLUTION INTRAMUSCULAR; INTRAVENOUS; SUBCUTANEOUS at 01:55

## 2021-08-06 RX ADMIN — HYDROMORPHONE HYDROCHLORIDE 1 MG: 1 INJECTION, SOLUTION INTRAMUSCULAR; INTRAVENOUS; SUBCUTANEOUS at 06:06

## 2021-08-06 RX ADMIN — TRAZODONE HYDROCHLORIDE 50 MG: 50 TABLET ORAL at 21:32

## 2021-08-06 RX ADMIN — OXYCODONE HYDROCHLORIDE 5 MG: 5 TABLET ORAL at 08:43

## 2021-08-06 RX ADMIN — CEFAZOLIN SODIUM 2 G: 100 INJECTION, POWDER, LYOPHILIZED, FOR SOLUTION INTRAVENOUS at 10:48

## 2021-08-06 RX ADMIN — HYDROMORPHONE HYDROCHLORIDE 2 MG: 2 TABLET ORAL at 04:03

## 2021-08-06 RX ADMIN — ENOXAPARIN SODIUM 40 MG: 40 INJECTION SUBCUTANEOUS at 14:06

## 2021-08-06 RX ADMIN — MEPERIDINE HYDROCHLORIDE 50 MG: 50 INJECTION INTRAMUSCULAR; INTRAVENOUS; SUBCUTANEOUS at 19:06

## 2021-08-06 RX ADMIN — OXYCODONE HYDROCHLORIDE 5 MG: 5 TABLET ORAL at 12:50

## 2021-08-06 NOTE — PROGRESS NOTES
ORTH FRACTURE PROGRESS NOTE    2021  Admit Date:   2021    Post Op day: 1 Day Post-Op    Subjective:    Sharlene Vernon     PT/OT:   Gait:                    Vital Signs:    Patient Vitals for the past 8 hrs:   BP Temp Pulse Resp SpO2   21 1128 127/76 98.1 °F (36.7 °C) 96 18 95 %   21 0751 106/77 97.9 °F (36.6 °C) 91 18 99 %     Temp (24hrs), Av °F (36.7 °C), Min:97.6 °F (36.4 °C), Max:98.4 °F (36.9 °C)      Pain Control:   Pain Assessment  Pain Scale 1: Numeric (0 - 10)  Pain Intensity 1: 8  Pain Onset 1: post op  Pain Location 1: Foot  Pain Orientation 1: Right  Pain Description 1: Aching, Sharp  Pain Intervention(s) 1: Medication (see MAR)    Meds:    Current Facility-Administered Medications   Medication Dose Route Frequency    oxyCODONE IR (ROXICODONE) tablet 5 mg  5 mg Oral Q4H PRN    lactated Ringers infusion  100 mL/hr IntraVENous CONTINUOUS    sodium chloride (NS) flush 5-40 mL  5-40 mL IntraVENous Q8H    sodium chloride (NS) flush 5-40 mL  5-40 mL IntraVENous PRN    acetaminophen (TYLENOL) tablet 500 mg  500 mg Oral Q6H PRN    hydrOXYzine HCL (ATARAX) tablet 25 mg  25 mg Oral DAILY    traZODone (DESYREL) tablet 50 mg  50 mg Oral QHS    venlafaxine-SR (EFFEXOR-XR) capsule 225 mg  225 mg Oral 7am    sodium chloride (NS) flush 5-40 mL  5-40 mL IntraVENous Q8H    sodium chloride (NS) flush 5-40 mL  5-40 mL IntraVENous PRN    ceFAZolin (ANCEF) 2 g/20 mL in sterile water IV syringe  2 g IntraVENous Q8H    naloxone (NARCAN) injection 0.4 mg  0.4 mg IntraVENous PRN    ondansetron (ZOFRAN) injection 4 mg  4 mg IntraVENous Q4H PRN    enoxaparin (LOVENOX) injection 40 mg  40 mg SubCUTAneous Q24H    HYDROmorphone (DILAUDID) injection 1 mg  1 mg IntraVENous Q4H PRN       LAB:    Recent Labs     21  0926   HCT 37.2   HGB 11.2*       24 Hour Assessment Issues:    Oriented    Discharge Planning: HOME    Transfuse PRBC's:      Assessment & Physician's Comment:  Dressing is clean, dry, and intact    Active Problems:    Cellulitis and abscess of foot (8/5/2021)        Plan:  Antibiotics per ID  Will try oral demerol in an effort to improve her pain control  Await cultures and home wound Katie Sands MD

## 2021-08-06 NOTE — PROGRESS NOTES
Attempted to treat. Pt mother in room and pt both refusing PT and would not like to have PT until they believe they are ready for it. Dr. Byron Bernstein was perfect served about consulting pt on further action from PT. Will attempt again if appropriate.

## 2021-08-06 NOTE — PROGRESS NOTES
Problem: Falls - Risk of  Goal: *Absence of Falls  Description: Document Isela Zapienen Fall Risk and appropriate interventions in the flowsheet.   Outcome: Progressing Towards Goal  Note: Fall Risk Interventions:  Mobility Interventions: Communicate number of staff needed for ambulation/transfer         Medication Interventions: Patient to call before getting OOB    Elimination Interventions: Call light in reach              Problem: Patient Education: Go to Patient Education Activity  Goal: Patient/Family Education  Outcome: Progressing Towards Goal

## 2021-08-06 NOTE — PROGRESS NOTES
Problem: Falls - Risk of  Goal: *Absence of Falls  Description: Document Griselda Alvarezasin Fall Risk and appropriate interventions in the flowsheet.   Outcome: Progressing Towards Goal  Note: Fall Risk Interventions:  Mobility Interventions: Communicate number of staff needed for ambulation/transfer, Patient to call before getting OOB         Medication Interventions: Patient to call before getting OOB, Teach patient to arise slowly    Elimination Interventions: Call light in reach, Patient to call for help with toileting needs, Toileting schedule/hourly rounds              Problem: Patient Education: Go to Patient Education Activity  Goal: Patient/Family Education  Outcome: Progressing Towards Goal

## 2021-08-06 NOTE — PROGRESS NOTES
Infectious Disease Consult    Today's Date: 2021   Admit Date: 2021    Impression:   · MSSA R foot HW related infection s/p debridement , debridement with [facility]   -post burkett procedure several years ago   · Prior MSSA L foot HW infection, removed and treated     Plan:   · Continue Cefazolin for now. Duration pending the depth of infection but anticipating longer duration. Seems HW still on medial aspect? · [facility] for PICC line placement today  · Follow OP cx. Also anaerobic GPC sent to reference lab    Anti-infectives:   · Keflex -  · Cefazolin -    Subjective: In pain this morning, pain medication not controlling. Afebrile, op culture with GPC    Review of Systems:  A comprehensive review of systems was negative except for that written in the History of Present Illness. Objective:     Visit Vitals  /77 (BP 1 Location: Left arm, BP Patient Position: At rest)   Pulse 91   Temp 97.9 °F (36.6 °C)   Resp 18   Wt 59 kg (130 lb)   SpO2 99%   BMI 20.36 kg/m²     Temp (24hrs), Av.9 °F (36.6 °C), Min:97.5 °F (36.4 °C), Max:98.4 °F (36.9 °C)       Lines:  Peripheral IV:       Physical Exam:  Performed  and unchanged except as noted below  General:  Alert, cooperative, well noursished, well developed, appears stated age   Eyes:  Sclera anicteric. Pupils equally round and reactive to light. Mouth/Throat: Mucous membranes normal, oral pharynx clear   Neck: Supple   Lungs:   Clear to auscultation bilaterally, good effort   CV:  Regular rate and rhythm,no murmur, click, rub or gallop   Abdomen:   Soft, non-tender.  bowel sounds normal. non-distended   Extremities: No cyanosis or edema   Skin: LEs with flea bites per pt        Musculoskeletal: R lateral heel area with VAC   Lines/Devices:  Intact, no erythema, drainage or tenderness   Psych: Alert and oriented, normal mood affect given the setting       Data Review:     CBC:  Recent Labs     21  0926   HGB 11.2*   HCT 37.2 BMP:  No results for input(s): CREA, BUN, NA, K, CL, CO2, AGAP, GLU in the last 72 hours. LFTS:  No results for input(s): TBILI, ALT, AP, TP, ALB in the last 72 hours.     No lab exists for component: SGOT    Microbiology:     All Micro Results     Procedure Component Value Units Date/Time    CULTURE, Augustine Veronica STAIN [[id number]]  (Abnormal) Collected: 08/05/21 1106    Order Status: Completed Specimen: Wound from Foot Updated: 08/06/21 1019     Special Requests: --        RIGHT  LATERAL       GRAM STAIN 0 TO 3 WBCS SEEN PER OIF      NO DEFINITE ORGANISM SEEN        Culture result:       SCANT GRAM POSITIVE COCCI SUBCULTURE IN PROGRESS          CULTURE, ANAEROBIC [[id number]] Collected: 08/05/21 1106    Order Status: Completed Specimen: Foot, Right Updated: 08/05/21 1304          Imaging:   See HPI/EPIC     Signed By: Thierry Lucas MD     August 6, 2021

## 2021-08-06 NOTE — WOUND CARE
Right dorsal foot near ankle, wound vac dressing changed. Wound is a 9cm incision with the lower portion open [id number], closed portion protected with adaptic, wound vac foam in open area and 1 piece of foam over the entire wound, seal achieved and machine working well. ABD used under tubing to help pad the area to prevent tubing from causing a wound and wrapped with gauze. Papers started for home vac and given to . Patients mother in room, watched wound vac dressing change, asks if she could perform the changes so they could go on a beach vacation as already planned later this month, mother states Dr. Boaz Pena states this is ok. Patient and daughter informed that home health will not be able to help if patient does not stay home bound, mother asks if home health could teach her how to perform the cares with vac and how to perform the IV antibiotics,  informed of mother's wishes. Next dressing change planned for Monday. Will monitor.

## 2021-08-06 NOTE — PROGRESS NOTES
CM consult received to assist with patient's wound vac and home IV set up. CM in to see patient and mother who are currently working with nursing. CM reviewed chart and notes that patient will need home wound vac placed prior to discharge. Order and corresponding paperwork faxed to [facility] and liaison notified. [employer] contacted regarding home IV antibiotics needs. Final sensitivities still pending, but as orders are currently written, patient will be covered at 100%. Home health order placed and referral sent to [facility]. CM anticipates ID will make final recommendations Monday and CM will confirm final plan with [employer] who will come and train patient and mother at that time. CM will continue to follow for ongoing discharge planning. Care Management Interventions  PCP Verified by CM: Yes (Uses Urgent Care for PCP)  Mode of Transport at Discharge: Other (see comment) (Mother)  Transition of Care Consult (CM Consult): Discharge Planning, [address]: Yes  Discharge Durable Medical Equipment: Yes (Wound Vac)  Physical Therapy Consult: Yes  Occupational Therapy Consult: No  Current Support Network: Relative's Home  Confirm Follow Up Transport: Family  The Plan for Transition of Care is Related to the Following Treatment Goals : Family will discharge home with home health services in order to maintain safe healing environment at home.    Discharge Location  Discharge Placement: Home with home health

## 2021-08-07 PROCEDURE — 74011250636 HC RX REV CODE- 250/636: Performed by: ORTHOPAEDIC SURGERY

## 2021-08-07 PROCEDURE — 74011250636 HC RX REV CODE- 250/636: Performed by: INTERNAL MEDICINE

## 2021-08-07 PROCEDURE — 65270000029 HC RM PRIVATE

## 2021-08-07 PROCEDURE — 74011000250 HC RX REV CODE- 250: Performed by: INTERNAL MEDICINE

## 2021-08-07 PROCEDURE — 74011250637 HC RX REV CODE- 250/637: Performed by: ORTHOPAEDIC SURGERY

## 2021-08-07 RX ADMIN — TRAZODONE HYDROCHLORIDE 50 MG: 50 TABLET ORAL at 21:24

## 2021-08-07 RX ADMIN — MEPERIDINE HYDROCHLORIDE 50 MG: 50 INJECTION INTRAMUSCULAR; INTRAVENOUS; SUBCUTANEOUS at 06:32

## 2021-08-07 RX ADMIN — OXYCODONE HYDROCHLORIDE 5 MG: 5 TABLET ORAL at 21:25

## 2021-08-07 RX ADMIN — OXYCODONE HYDROCHLORIDE 5 MG: 5 TABLET ORAL at 12:36

## 2021-08-07 RX ADMIN — Medication 10 ML: at 21:31

## 2021-08-07 RX ADMIN — Medication 10 ML: at 13:29

## 2021-08-07 RX ADMIN — OXYCODONE HYDROCHLORIDE 5 MG: 5 TABLET ORAL at 08:35

## 2021-08-07 RX ADMIN — MEPERIDINE HYDROCHLORIDE 50 MG: 50 INJECTION INTRAMUSCULAR; INTRAVENOUS; SUBCUTANEOUS at 10:38

## 2021-08-07 RX ADMIN — HYDROXYZINE HYDROCHLORIDE 25 MG: 25 TABLET, FILM COATED ORAL at 08:35

## 2021-08-07 RX ADMIN — ENOXAPARIN SODIUM 40 MG: 40 INJECTION SUBCUTANEOUS at 13:28

## 2021-08-07 RX ADMIN — MEPERIDINE HYDROCHLORIDE 50 MG: 50 INJECTION INTRAMUSCULAR; INTRAVENOUS; SUBCUTANEOUS at 00:08

## 2021-08-07 RX ADMIN — OXYCODONE HYDROCHLORIDE 5 MG: 5 TABLET ORAL at 02:12

## 2021-08-07 RX ADMIN — OXYCODONE HYDROCHLORIDE 5 MG: 5 TABLET ORAL at 16:56

## 2021-08-07 RX ADMIN — CEFAZOLIN SODIUM 2 G: 100 INJECTION, POWDER, LYOPHILIZED, FOR SOLUTION INTRAVENOUS at 18:53

## 2021-08-07 RX ADMIN — CEFAZOLIN SODIUM 2 G: 100 INJECTION, POWDER, LYOPHILIZED, FOR SOLUTION INTRAVENOUS at 10:38

## 2021-08-07 RX ADMIN — MEPERIDINE HYDROCHLORIDE 50 MG: 50 INJECTION INTRAMUSCULAR; INTRAVENOUS; SUBCUTANEOUS at 18:53

## 2021-08-07 RX ADMIN — CEFAZOLIN SODIUM 2 G: 100 INJECTION, POWDER, LYOPHILIZED, FOR SOLUTION INTRAVENOUS at 06:33

## 2021-08-07 RX ADMIN — VENLAFAXINE HYDROCHLORIDE 225 MG: 75 CAPSULE, EXTENDED RELEASE ORAL at 06:34

## 2021-08-07 RX ADMIN — Medication 5 ML: at 06:34

## 2021-08-07 RX ADMIN — MEPERIDINE HYDROCHLORIDE 50 MG: 50 INJECTION INTRAMUSCULAR; INTRAVENOUS; SUBCUTANEOUS at 14:46

## 2021-08-07 RX ADMIN — MEPERIDINE HYDROCHLORIDE 50 MG: 50 INJECTION INTRAMUSCULAR; INTRAVENOUS; SUBCUTANEOUS at 23:32

## 2021-08-07 NOTE — PROGRESS NOTES
Problem: Falls - Risk of  Goal: *Absence of Falls  Description: Document Shahriar Leal Fall Risk and appropriate interventions in the flowsheet.   Outcome: Progressing Towards Goal  Note: Fall Risk Interventions:  Mobility Interventions: Communicate number of staff needed for ambulation/transfer         Medication Interventions: Bed/chair exit alarm    Elimination Interventions: Call light in reach              Problem: Patient Education: Go to Patient Education Activity  Goal: Patient/Family Education  Outcome: Progressing Towards Goal

## 2021-08-07 NOTE — PROGRESS NOTES
Went to place picc pt stated that she is allergic to chloraprep and betadine so will hold off on picc insertion until Monday when we can get some sterile alcohol from warehouse.   rn aware

## 2021-08-07 NOTE — PROGRESS NOTES
[facility] POST OP PROGRESS NOTE    2021  Admit Date: 2021  Admit Diagnosis: Cellulitis of right lower extremity [L03.115]  Cellulitis and abscess of foot [L03.119, L02.619]  Procedure: Procedure(s):  INCISION AND DRAINAGE RIGHT FOOT  AND REMOVAL OF HARDWARE, WITH ANTIBIOTIC BEADS  AND WOUND VAC PLACEMENT  Post Op day: 2 Days Post-Op      Subjective:     Melchor Washington is a patient who says demerol is controlling pain better. Mom is asking if this is what she will have upon discharge. .   Unable to put PICC in today due to allergy to chlorohexidine. Sterile alcohol in storage and will not be able to get it until Monday per mom. PICC placement Monday. Objective:     Vital Signs:    Blood pressure 102/67, pulse 86, temperature 98 °F (36.7 °C), resp. rate 16, weight 130 lb (59 kg), last menstrual period 2021, SpO2 98 %. Temp (24hrs), Av.1 °F (36.7 °C), Min:97.8 °F (36.6 °C), Max:98.8 °F (37.1 °C)      No intake/output data recorded.  1901 -  0700  In: 950 [I.V.:950]  Out: -     LAB:    Recent Labs     21  0926   HGB 11.2*       Physical Exam    General:   Alert and oriented. No acute distress  Lungs:  Respirations unlabored. Extremities: No evidence of cyanosis. Calves soft, nontender. Moves both upper and lower extremities. Dressing:  wound vac   Neuro:  no deficit      Assessment:      Patient Active Problem List   Diagnosis Code    Parent refuses immunizations Z28.82    Scoliosis (managed by [facility] [place] [facility]) M41.9    S/P foot surgery, left (surgeries for \"flat feet\" - under care of POA) Z98.890    Cholecystitis K81.9    Cellulitis of left jaw L03. 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

## 2021-08-07 NOTE — PROGRESS NOTES
Physical Therapy Note:    Physical therapy evaluation orders received and chart reviewed. Patient admitted s/p I&D RLE foot, removal of hardware, wound vac placement with Dr. Emely Hernandez. Pt has extensive history of foot/ankle surgeries, she already has all necessary equipment and is experienced and able to maintain NWB RLE. This therapist spoke to mother and pt who verbalize no PT related needs at this time, pt has been able to mobilize around room using her crutches with no issue. They understand to speak to RN who will re-consult therapy if any issues or needs arise prior to DC home. Will DC PT orders at this time per discussion with pt and mother. No evaluation billed this date.     Thank you,  Lloyd Joy, PT, DPT

## 2021-08-07 NOTE — PROGRESS NOTES
Infectious Disease Consult    Today's Date: 2021   Admit Date: 2021    Impression:   · MSSA R foot HW related infection s/p debridement , debridement with [facility]   -post burkett procedure several years ago   · Prior MSSA L foot HW infection, removed and treated     Plan:   · Continue Cefazolin X 6 weeks with EOT 21  · Follow OP cx. Also anaerobic GPC sent to reference lab  · Still needs PICC placement  · ID OPAT Orders:  · Ancef 2 gm IV Q 8 hrs X 6 weeks with EOT 21  · Routine PICC care  · Q Monday CBC with diff, ESR, CRP, Scr, LFT's  · Please fax results to [phone]  · ID office follow-up to be scheduled for  21 @ 0940    Discharge likely Monday pending home vac delivery  Anti-infectives:   · Keflex -  · Cefazolin -    Subjective:   Afebrile, op culture with GPC. Mild improvement with pain today but still present. Review of Systems:  A comprehensive review of systems was negative except for that written in the History of Present Illness. Objective:     Visit Vitals  /66 (BP 1 Location: Left upper arm, BP Patient Position: At rest)   Pulse 76   Temp 98.1 °F (36.7 °C)   Resp 17   Wt 59 kg (130 lb)   SpO2 91%   BMI 20.36 kg/m²     Temp (24hrs), Av.1 °F (36.7 °C), Min:97.8 °F (36.6 °C), Max:98.8 °F (37.1 °C)       Lines:  Peripheral IV:       Physical Exam:  Performed  and unchanged except as noted below  General:  Alert, cooperative, well noursished, well developed, appears stated age   Eyes:  Sclera anicteric. Pupils equally round and reactive to light. Mouth/Throat: Mucous membranes normal, oral pharynx clear   Neck: Supple   Lungs:   Clear to auscultation bilaterally, good effort   CV:  Regular rate and rhythm,no murmur, click, rub or gallop   Abdomen:   Soft, non-tender.  bowel sounds normal. non-distended   Extremities: No cyanosis or edema   Skin: LEs with flea bites per pt        Musculoskeletal: R lateral heel area with VAC   Lines/Devices:  Intact, no erythema, drainage or tenderness   Psych: Alert and oriented, normal mood affect given the setting       Data Review:     CBC:  Recent Labs     08/05/21 0926   HGB 11.2*   HCT 37.2       BMP:  No results for input(s): CREA, BUN, NA, K, CL, CO2, AGAP, GLU in the last 72 hours. LFTS:  No results for input(s): TBILI, ALT, AP, TP, ALB in the last 72 hours. No lab exists for component: SGOT    Microbiology:     All Micro Results     Procedure Component Value Units Date/Time    CULTURE, Leonela Staff STAIN [[id number]]  (Abnormal) Collected: 08/05/21 1106    Order Status: Completed Specimen: Wound from Foot Updated: 08/07/21 0924     Special Requests: --        RIGHT  LATERAL       GRAM STAIN 0 TO 3 WBCS SEEN PER OIF      NO DEFINITE ORGANISM SEEN        Culture result:       LIGHT STAPHYLOCOCCUS AUREUS SENSITIVITY TO FOLLOW                  LIGHT STAPHYLOCOCCUS SPECIES, COAGULASE NEGATIVE THIS ORGANISM WILL BE HELD FOR 7 DAYS. IF FURTHER TESTING IS REQUIRED PLEASE NOTIFY MICROBIOLOGY          CULTURE, ANAEROBIC [[id number]] Collected: 08/05/21 1106    Order Status: Completed Specimen: Foot, Right Updated: 08/06/21 1217     Special Requests: LATERAL        Culture result:       SUBCULTURE IS NECESSARY TO DETERMINE PRESENCE OR ABSENCE OF ANAEROBIC BACTERIA IN THIS CULTURE. FURTHER REPORT TO FOLLOW AFTER INCUBATION OF SUBCULTURE.                 Imaging:   See HPI/EPIC     Signed By: Jose Hicks NP     August 7, 2021

## 2021-08-08 LAB
BACTERIA SPEC CULT: ABNORMAL
BACTERIA SPEC CULT: ABNORMAL
GRAM STN SPEC: ABNORMAL
GRAM STN SPEC: ABNORMAL
SERVICE CMNT-IMP: ABNORMAL

## 2021-08-08 PROCEDURE — 74011250636 HC RX REV CODE- 250/636: Performed by: INTERNAL MEDICINE

## 2021-08-08 PROCEDURE — 65270000029 HC RM PRIVATE

## 2021-08-08 PROCEDURE — 74011250636 HC RX REV CODE- 250/636: Performed by: ORTHOPAEDIC SURGERY

## 2021-08-08 PROCEDURE — 74011000250 HC RX REV CODE- 250: Performed by: INTERNAL MEDICINE

## 2021-08-08 PROCEDURE — 74011250637 HC RX REV CODE- 250/637: Performed by: ORTHOPAEDIC SURGERY

## 2021-08-08 RX ADMIN — OXYCODONE HYDROCHLORIDE 5 MG: 5 TABLET ORAL at 19:15

## 2021-08-08 RX ADMIN — Medication 10 ML: at 14:07

## 2021-08-08 RX ADMIN — Medication 10 ML: at 21:17

## 2021-08-08 RX ADMIN — MEPERIDINE HYDROCHLORIDE 50 MG: 50 INJECTION INTRAMUSCULAR; INTRAVENOUS; SUBCUTANEOUS at 03:46

## 2021-08-08 RX ADMIN — OXYCODONE HYDROCHLORIDE 5 MG: 5 TABLET ORAL at 01:37

## 2021-08-08 RX ADMIN — CEFAZOLIN SODIUM 2 G: 100 INJECTION, POWDER, LYOPHILIZED, FOR SOLUTION INTRAVENOUS at 11:17

## 2021-08-08 RX ADMIN — OXYCODONE HYDROCHLORIDE 5 MG: 5 TABLET ORAL at 23:00

## 2021-08-08 RX ADMIN — MEPERIDINE HYDROCHLORIDE 50 MG: 50 INJECTION INTRAMUSCULAR; INTRAVENOUS; SUBCUTANEOUS at 08:28

## 2021-08-08 RX ADMIN — HYDROXYZINE HYDROCHLORIDE 25 MG: 25 TABLET, FILM COATED ORAL at 08:28

## 2021-08-08 RX ADMIN — VENLAFAXINE HYDROCHLORIDE 225 MG: 75 CAPSULE, EXTENDED RELEASE ORAL at 06:58

## 2021-08-08 RX ADMIN — CEFAZOLIN SODIUM 2 G: 100 INJECTION, POWDER, LYOPHILIZED, FOR SOLUTION INTRAVENOUS at 03:48

## 2021-08-08 RX ADMIN — Medication 10 ML: at 06:59

## 2021-08-08 RX ADMIN — CEFAZOLIN SODIUM 2 G: 100 INJECTION, POWDER, LYOPHILIZED, FOR SOLUTION INTRAVENOUS at 21:06

## 2021-08-08 RX ADMIN — MEPERIDINE HYDROCHLORIDE 50 MG: 50 INJECTION INTRAMUSCULAR; INTRAVENOUS; SUBCUTANEOUS at 12:48

## 2021-08-08 RX ADMIN — OXYCODONE HYDROCHLORIDE 5 MG: 5 TABLET ORAL at 10:28

## 2021-08-08 RX ADMIN — OXYCODONE HYDROCHLORIDE 5 MG: 5 TABLET ORAL at 14:43

## 2021-08-08 RX ADMIN — MEPERIDINE HYDROCHLORIDE 50 MG: 50 INJECTION INTRAMUSCULAR; INTRAVENOUS; SUBCUTANEOUS at 17:01

## 2021-08-08 RX ADMIN — TRAZODONE HYDROCHLORIDE 50 MG: 50 TABLET ORAL at 21:09

## 2021-08-08 RX ADMIN — MEPERIDINE HYDROCHLORIDE 50 MG: 50 INJECTION INTRAMUSCULAR; INTRAVENOUS; SUBCUTANEOUS at 21:09

## 2021-08-08 RX ADMIN — ENOXAPARIN SODIUM 40 MG: 40 INJECTION SUBCUTANEOUS at 14:06

## 2021-08-08 RX ADMIN — OXYCODONE HYDROCHLORIDE 5 MG: 5 TABLET ORAL at 06:02

## 2021-08-08 NOTE — PROGRESS NOTES
ORTH FRACTURE PROGRESS NOTE    2021  Admit Date:   2021    Post Op day: 3 Days Post-Op    Subjective:    Jeramy Vernon PATIENT LYING IN BED; COMPLAINS OF PAIN; MOTHER AT BEDSIDE     PT/OT:   Gait:                    Vital Signs:    Patient Vitals for the past 8 hrs:   BP Temp Pulse Resp SpO2   21 0744 105/64 98.1 °F (36.7 °C) 81 17 99 %   21 0343 112/72 97.6 °F (36.4 °C) 79 16 98 %     Temp (24hrs), Av.9 °F (36.6 °C), Min:97.5 °F (36.4 °C), Max:98.2 °F (36.8 °C)      Pain Control:   Pain Assessment  Pain Scale 1: Numeric (0 - 10)  Pain Intensity 1: 7  Pain Onset 1: postop  Pain Location 1: Foot  Pain Orientation 1: Right  Pain Description 1: Aching  Pain Intervention(s) 1: Medication (see MAR)    Meds:    Current Facility-Administered Medications   Medication Dose Route Frequency    oxyCODONE IR (ROXICODONE) tablet 5 mg  5 mg Oral Q4H PRN    meperidine (DEMEROL) injection 50 mg  50 mg IntraVENous Q4H PRN    lactated Ringers infusion  100 mL/hr IntraVENous CONTINUOUS    acetaminophen (TYLENOL) tablet 500 mg  500 mg Oral Q6H PRN    hydrOXYzine HCL (ATARAX) tablet 25 mg  25 mg Oral DAILY    traZODone (DESYREL) tablet 50 mg  50 mg Oral QHS    venlafaxine-SR (EFFEXOR-XR) capsule 225 mg  225 mg Oral 7am    sodium chloride (NS) flush 5-40 mL  5-40 mL IntraVENous Q8H    sodium chloride (NS) flush 5-40 mL  5-40 mL IntraVENous PRN    ceFAZolin (ANCEF) 2 g/20 mL in sterile water IV syringe  2 g IntraVENous Q8H    naloxone (NARCAN) injection 0.4 mg  0.4 mg IntraVENous PRN    ondansetron (ZOFRAN) injection 4 mg  4 mg IntraVENous Q4H PRN    enoxaparin (LOVENOX) injection 40 mg  40 mg SubCUTAneous Q24H       LAB:    No results for input(s): HCT, HCTEXT, HGB, HGBEXT, INR, INREXT, HCTEXT, HGBEXT, INREXT in the last 72 hours.     24 Hour Assessment Issues:    Oriented    Discharge Planning: HOME    Transfuse PRBC's:      Assessment & Physician's Comment:  Dressing is clean, dry, and intact  Neurovascular checks within normal limits   WOUND VAC IN PLACE    Active Problems:    Cellulitis and abscess of foot (8/5/2021)        Plan:  [address], NP

## 2021-08-08 NOTE — PROGRESS NOTES
Problem: Falls - Risk of  Goal: *Absence of Falls  Description: Document Tammy Hart Fall Risk and appropriate interventions in the flowsheet.   Outcome: Progressing Towards Goal  Note: Fall Risk Interventions:  Mobility Interventions: Bed/chair exit alarm, Patient to call before getting OOB         Medication Interventions: Patient to call before getting OOB    Elimination Interventions: Call light in reach              Problem: Patient Education: Go to Patient Education Activity  Goal: Patient/Family Education  Outcome: Progressing Towards Goal

## 2021-08-09 PROCEDURE — 74011250636 HC RX REV CODE- 250/636: Performed by: INTERNAL MEDICINE

## 2021-08-09 PROCEDURE — 65270000029 HC RM PRIVATE

## 2021-08-09 PROCEDURE — 2709999900 HC NON-CHARGEABLE SUPPLY

## 2021-08-09 PROCEDURE — 74011250637 HC RX REV CODE- 250/637: Performed by: ORTHOPAEDIC SURGERY

## 2021-08-09 PROCEDURE — 97605 NEG PRS WND THER DME<=50SQCM: CPT

## 2021-08-09 PROCEDURE — 74011250636 HC RX REV CODE- 250/636: Performed by: ORTHOPAEDIC SURGERY

## 2021-08-09 PROCEDURE — 74011000250 HC RX REV CODE- 250: Performed by: INTERNAL MEDICINE

## 2021-08-09 PROCEDURE — C1751 CATH, INF, PER/CENT/MIDLINE: HCPCS

## 2021-08-09 PROCEDURE — 77030019934 HC DRSG VAC ASST KCON -B

## 2021-08-09 RX ORDER — SODIUM CHLORIDE 0.9 % (FLUSH) 0.9 %
10 SYRINGE (ML) INJECTION AS NEEDED
Status: DISCONTINUED | OUTPATIENT
Start: 2021-08-09 | End: 2021-08-11 | Stop reason: HOSPADM

## 2021-08-09 RX ORDER — SODIUM CHLORIDE 0.9 % (FLUSH) 0.9 %
10 SYRINGE (ML) INJECTION EVERY 8 HOURS
Status: DISCONTINUED | OUTPATIENT
Start: 2021-08-09 | End: 2021-08-11 | Stop reason: HOSPADM

## 2021-08-09 RX ADMIN — ONDANSETRON 4 MG: 2 INJECTION INTRAMUSCULAR; INTRAVENOUS at 09:15

## 2021-08-09 RX ADMIN — Medication 10 ML: at 21:17

## 2021-08-09 RX ADMIN — MEPERIDINE HYDROCHLORIDE 50 MG: 50 INJECTION INTRAMUSCULAR; INTRAVENOUS; SUBCUTANEOUS at 01:00

## 2021-08-09 RX ADMIN — ENOXAPARIN SODIUM 40 MG: 40 INJECTION SUBCUTANEOUS at 15:38

## 2021-08-09 RX ADMIN — HYDROXYZINE HYDROCHLORIDE 25 MG: 25 TABLET, FILM COATED ORAL at 09:15

## 2021-08-09 RX ADMIN — MEPERIDINE HYDROCHLORIDE 50 MG: 50 INJECTION INTRAMUSCULAR; INTRAVENOUS; SUBCUTANEOUS at 21:16

## 2021-08-09 RX ADMIN — CEFAZOLIN SODIUM 2 G: 100 INJECTION, POWDER, LYOPHILIZED, FOR SOLUTION INTRAVENOUS at 13:00

## 2021-08-09 RX ADMIN — Medication 10 ML: at 05:13

## 2021-08-09 RX ADMIN — CEFAZOLIN SODIUM 2 G: 100 INJECTION, POWDER, LYOPHILIZED, FOR SOLUTION INTRAVENOUS at 21:16

## 2021-08-09 RX ADMIN — MEPERIDINE HYDROCHLORIDE 50 MG: 50 INJECTION INTRAMUSCULAR; INTRAVENOUS; SUBCUTANEOUS at 09:15

## 2021-08-09 RX ADMIN — VENLAFAXINE HYDROCHLORIDE 225 MG: 75 CAPSULE, EXTENDED RELEASE ORAL at 06:24

## 2021-08-09 RX ADMIN — MEPERIDINE HYDROCHLORIDE 50 MG: 50 INJECTION INTRAMUSCULAR; INTRAVENOUS; SUBCUTANEOUS at 13:08

## 2021-08-09 RX ADMIN — Medication 10 ML: at 14:08

## 2021-08-09 RX ADMIN — Medication 10 ML: at 21:16

## 2021-08-09 RX ADMIN — OXYCODONE HYDROCHLORIDE 5 MG: 5 TABLET ORAL at 19:17

## 2021-08-09 RX ADMIN — Medication 5 ML: at 01:02

## 2021-08-09 RX ADMIN — CEFAZOLIN SODIUM 2 G: 100 INJECTION, POWDER, LYOPHILIZED, FOR SOLUTION INTRAVENOUS at 05:13

## 2021-08-09 RX ADMIN — OXYCODONE HYDROCHLORIDE 5 MG: 5 TABLET ORAL at 14:08

## 2021-08-09 RX ADMIN — OXYCODONE HYDROCHLORIDE 5 MG: 5 TABLET ORAL at 02:47

## 2021-08-09 RX ADMIN — MEPERIDINE HYDROCHLORIDE 50 MG: 50 INJECTION INTRAMUSCULAR; INTRAVENOUS; SUBCUTANEOUS at 17:06

## 2021-08-09 RX ADMIN — OXYCODONE HYDROCHLORIDE 5 MG: 5 TABLET ORAL at 22:54

## 2021-08-09 RX ADMIN — MEPERIDINE HYDROCHLORIDE 50 MG: 50 INJECTION INTRAMUSCULAR; INTRAVENOUS; SUBCUTANEOUS at 05:13

## 2021-08-09 RX ADMIN — OXYCODONE HYDROCHLORIDE 5 MG: 5 TABLET ORAL at 10:14

## 2021-08-09 RX ADMIN — TRAZODONE HYDROCHLORIDE 50 MG: 50 TABLET ORAL at 21:16

## 2021-08-09 RX ADMIN — OXYCODONE HYDROCHLORIDE 5 MG: 5 TABLET ORAL at 06:25

## 2021-08-09 NOTE — PROGRESS NOTES
ORTH FRACTURE PROGRESS NOTE    2021  Admit Date:   2021    Post Op day: 4 Days Post-Op    Subjective:    Faizan Vernon     PT/OT:   Gait:                    Vital Signs:    Patient Vitals for the past 8 hrs:   BP Temp Pulse Resp SpO2   21 0452 108/71 97.7 °F (36.5 °C) 74 16 96 %   21 2318 96/61 98.2 °F (36.8 °C) 79 18 99 %     Temp (24hrs), Av.1 °F (36.7 °C), Min:97.7 °F (36.5 °C), Max:98.3 °F (36.8 °C)      Pain Control:   Pain Assessment  Pain Scale 1: FLACC  Pain Intensity 1: 0  Pain Onset 1: post op  Pain Location 1: Ankle, Foot  Pain Orientation 1: Right  Pain Description 1: Aching  Pain Intervention(s) 1: Medication (see MAR)    Meds:    Current Facility-Administered Medications   Medication Dose Route Frequency    oxyCODONE IR (ROXICODONE) tablet 5 mg  5 mg Oral Q4H PRN    meperidine (DEMEROL) injection 50 mg  50 mg IntraVENous Q4H PRN    lactated Ringers infusion  100 mL/hr IntraVENous CONTINUOUS    acetaminophen (TYLENOL) tablet 500 mg  500 mg Oral Q6H PRN    hydrOXYzine HCL (ATARAX) tablet 25 mg  25 mg Oral DAILY    traZODone (DESYREL) tablet 50 mg  50 mg Oral QHS    venlafaxine-SR (EFFEXOR-XR) capsule 225 mg  225 mg Oral 7am    sodium chloride (NS) flush 5-40 mL  5-40 mL IntraVENous Q8H    sodium chloride (NS) flush 5-40 mL  5-40 mL IntraVENous PRN    ceFAZolin (ANCEF) 2 g/20 mL in sterile water IV syringe  2 g IntraVENous Q8H    naloxone (NARCAN) injection 0.4 mg  0.4 mg IntraVENous PRN    ondansetron (ZOFRAN) injection 4 mg  4 mg IntraVENous Q4H PRN    enoxaparin (LOVENOX) injection 40 mg  40 mg SubCUTAneous Q24H       LAB:    No results for input(s): HCT, HCTEXT, HGB, HGBEXT, INR, INREXT, HCTEXT, HGBEXT, INREXT in the last 72 hours.     24 Hour Assessment Issues:    Oriented    Discharge Planning: HOME    Transfuse PRBC's:      Assessment & Physician's Comment:  Neurovascular checks within normal limits    Active Problems:    Cellulitis and abscess of foot (8/5/2021)        Plan:  abx plans noted -- needs PICC  Needs home vac and hh vac changes q3days arranged    Prasanna Garcia MD

## 2021-08-09 NOTE — PROGRESS NOTES
PICC Placement Note    PRE-PROCEDURE VERIFICATION  Correct Procedure: yes. Time out completed with assistant Angelique Estrada RN and all persons present in agreement with time out. Correct Site:  yes  Temperature: Temp: 97.9 °F (36.6 °C), Temperature Source: Temp Source: Oral  No results for input(s): BUN, CREA, PLT, INR, WBC, PLTEXT, INREXT in the last 72 hours. No lab exists for component: APTHR  Allergies: Latex, Chlorhexidine, Betadine [povidone-iodine], and Hydrocodone  Education materials for [facility] Care given to patient or family. PROCEDURE DETAIL  A single lumen PICC line was started for antibiotic therapy. The following documentation is in addition to the PICC properties in the lines/airways flowsheet :  Lot #: [id number]  xylocaine used: yes  Mid-Arm Circumference: 27 (cm)  Internal Catheter Length: 40 (cm)  Internal Catheter Total Length: 40 (cm)  Vein Selection for PICC:right basilic  Central Line Bundle followed yes  Complication Related to Insertion: none. Due to pt's allergies to chlorhixadine and betadine; right upper arm was scrubbed with sterile alcohol swab sticks. Both the insertion guidewire and ECG guidewire were removed intact all ports have positive blood return and were flush well with normal saline. The location of the tip of the PICC is verified using ECG technology. The tip is in the SVC per ECG reading. See image below.      Line is okay to use: yes

## 2021-08-09 NOTE — WOUND CARE
Right foot wound vac dressing changed. Wound is granular, pink viable. Radha wound skin is macerated however suture line is well approximated. Seal achieved and machine working well. Mother expressed after watching dressing change Friday and thinking about the dressing over the weekend she does not feel she could be the one to learn how to do the dressings. She also states that they will need home health.  updated. Patient and mother informed that home health means the patient is home bound, both verbalized understanding. Will monitor.

## 2021-08-09 NOTE — PROGRESS NOTES
Infectious Disease Progress Note    Today's Date: 2021   Admit Date: 2021    Impression:   · MSSA R foot HW-related infection s/p debridement , op culture with MSSA and anaerobe (sent to [address] [place] [address] lab, pending); s/p debridement with [facility] , op culture with MSSA  · Calcaneocuboid joint arthrodesis 21  · Prior MSSA L foot HW infection, removed and treated   · History of Jane lateral column lengthening procedure several years ago    Plan:   · No change to ID plan of Care:   · ID OPAT Orders:  · Ancef 2 gm IV Q 8 hrs X 6 weeks with EOT 21  · Routine PICC care  · Q Monday CBC with diff, ESR, CRP, Scr, LFT's  · Please fax results to [phone]  · ID office follow-up scheduled for  21 at 0940    She would benefit from zofran prn for nausea on discharge. Anti-infectives:   · Keflex -  · Cefazolin -    Subjective: Interval History: PICC placed this morning. Discharge pending wound vac and home health arrangements. Reports some nausea that she thinks is associated with antibiotics. Review of Systems:  Pertinent items are noted in the History of Present Illness.     Objective:     Visit Vitals  /66   Pulse 88   Temp 97.9 °F (36.6 °C)   Resp 17   Wt 59 kg (130 lb)   SpO2 99%   BMI 20.36 kg/m²     Temp (24hrs), Av.1 °F (36.7 °C), Min:97.7 °F (36.5 °C), Max:98.3 °F (36.8 °C)     General:  Alert, no acute distress, appears stated age, thin  Head:    Normocephalic, atraumatic  Eyes:   Anicteric sclerae, no drainage, not injected, EOMI  Mouth:  Moist mucosa, op clear, teeth in good repair  Neck:   Supple, symmetrical, trachea midline, no JVD  Lungs:   Clear without increased work of breathing or audible wheezes  CV:   Regular rate and rhythm without audible murmur  Abdomen:  Soft, non tender, not distended, active bowel sounds  Extremities:  No cyanosis or edema; R foot wound vac in place  Musculoskeletal: Moves all extremities with equal strength, no deformity  Skin:   No acute rash, several small red lesions over extremities  Psych:  Alert, oriented and appropriate without evidence of thought disorder  Lines:    benign      Data Review:     CBC:  No results for input(s): WBC, GRANS, MONOS, EOS, ANEU, ABL, HGB, HCT, PLT, HGBEXT, HCTEXT, PLTEXT, HGBEXT, HCTEXT, PLTEXT in the last 72 hours. No lab exists for component: LYMPHS,  MARIA A    BMP:  No results for input(s): CREA, BUN, NA, K, CL, CO2, AGAP, GLU in the last 72 hours. LFTS:  No results for input(s): TBILI, ALT, AP, TP, ALB in the last 72 hours. No lab exists for component: SGOT    Microbiology:     All Micro Results     Procedure Component Value Units Date/Time    CULTURE, Theresa Ca STAIN [[id number]]  (Abnormal)  (Susceptibility) Collected: 08/05/21 1106    Order Status: Completed Specimen: Wound from Foot Updated: 08/08/21 0820     Special Requests: --        RIGHT  LATERAL       GRAM STAIN 0 TO 3 WBCS SEEN PER OIF      NO DEFINITE ORGANISM SEEN        Culture result:       LIGHT STAPHYLOCOCCUS AUREUS                  LIGHT STAPHYLOCOCCUS SPECIES, COAGULASE NEGATIVE THIS ORGANISM WILL BE HELD FOR 7 DAYS. IF FURTHER TESTING IS REQUIRED PLEASE NOTIFY MICROBIOLOGY          CULTURE, ANAEROBIC [[id number]] Collected: 08/05/21 1106    Order Status: Completed Specimen: Foot, Right Updated: 08/06/21 1217     Special Requests: LATERAL        Culture result:       SUBCULTURE IS NECESSARY TO DETERMINE PRESENCE OR ABSENCE OF ANAEROBIC BACTERIA IN THIS CULTURE. FURTHER REPORT TO FOLLOW AFTER INCUBATION OF SUBCULTURE.                 Imaging:   Reviewed - none this admission    Signed By: Reuben Mendes NP     August 9, 2021

## 2021-08-09 NOTE — PROGRESS NOTES
Message sent to [facility] for follow up on wound vac status. Per liaison, benefits are being verified and then the order will be released for delivery: CM anticipates late this afternoon. [facility] liaison notified that final ID recommendations are in for their review and to schedule to meet with patient and mother. PICC placed this AM.  CM updated patient at beside. CM anticipates that patient will have all arrangements confirmed, training completed and antibiotic delivery scheduled in order for patient to discharge tomorrow. Home Health order updated with final recommendations and labs. [employer] Update sent to attending.

## 2021-08-10 PROCEDURE — 74011250636 HC RX REV CODE- 250/636: Performed by: ORTHOPAEDIC SURGERY

## 2021-08-10 PROCEDURE — 74011250636 HC RX REV CODE- 250/636: Performed by: INTERNAL MEDICINE

## 2021-08-10 PROCEDURE — 74011250637 HC RX REV CODE- 250/637: Performed by: ORTHOPAEDIC SURGERY

## 2021-08-10 PROCEDURE — 65270000029 HC RM PRIVATE

## 2021-08-10 PROCEDURE — 74011000250 HC RX REV CODE- 250: Performed by: INTERNAL MEDICINE

## 2021-08-10 RX ADMIN — OXYCODONE HYDROCHLORIDE 5 MG: 5 TABLET ORAL at 11:21

## 2021-08-10 RX ADMIN — OXYCODONE HYDROCHLORIDE 5 MG: 5 TABLET ORAL at 02:37

## 2021-08-10 RX ADMIN — Medication 10 ML: at 01:10

## 2021-08-10 RX ADMIN — ONDANSETRON 4 MG: 2 INJECTION INTRAMUSCULAR; INTRAVENOUS at 21:17

## 2021-08-10 RX ADMIN — CEFAZOLIN SODIUM 2 G: 100 INJECTION, POWDER, LYOPHILIZED, FOR SOLUTION INTRAVENOUS at 12:42

## 2021-08-10 RX ADMIN — HYDROXYZINE HYDROCHLORIDE 25 MG: 25 TABLET, FILM COATED ORAL at 08:47

## 2021-08-10 RX ADMIN — MEPERIDINE HYDROCHLORIDE 50 MG: 50 INJECTION INTRAMUSCULAR; INTRAVENOUS; SUBCUTANEOUS at 12:42

## 2021-08-10 RX ADMIN — TRAZODONE HYDROCHLORIDE 50 MG: 50 TABLET ORAL at 21:17

## 2021-08-10 RX ADMIN — MEPERIDINE HYDROCHLORIDE 50 MG: 50 INJECTION INTRAMUSCULAR; INTRAVENOUS; SUBCUTANEOUS at 01:10

## 2021-08-10 RX ADMIN — ENOXAPARIN SODIUM 40 MG: 40 INJECTION SUBCUTANEOUS at 13:38

## 2021-08-10 RX ADMIN — Medication 10 ML: at 21:17

## 2021-08-10 RX ADMIN — MEPERIDINE HYDROCHLORIDE 50 MG: 50 INJECTION INTRAMUSCULAR; INTRAVENOUS; SUBCUTANEOUS at 21:17

## 2021-08-10 RX ADMIN — Medication 10 ML: at 13:06

## 2021-08-10 RX ADMIN — OXYCODONE HYDROCHLORIDE 5 MG: 5 TABLET ORAL at 18:50

## 2021-08-10 RX ADMIN — VENLAFAXINE HYDROCHLORIDE 225 MG: 75 CAPSULE, EXTENDED RELEASE ORAL at 06:25

## 2021-08-10 RX ADMIN — OXYCODONE HYDROCHLORIDE 5 MG: 5 TABLET ORAL at 14:59

## 2021-08-10 RX ADMIN — OXYCODONE HYDROCHLORIDE 5 MG: 5 TABLET ORAL at 22:59

## 2021-08-10 RX ADMIN — MEPERIDINE HYDROCHLORIDE 50 MG: 50 INJECTION INTRAMUSCULAR; INTRAVENOUS; SUBCUTANEOUS at 17:02

## 2021-08-10 RX ADMIN — MEPERIDINE HYDROCHLORIDE 50 MG: 50 INJECTION INTRAMUSCULAR; INTRAVENOUS; SUBCUTANEOUS at 04:59

## 2021-08-10 RX ADMIN — OXYCODONE HYDROCHLORIDE 5 MG: 5 TABLET ORAL at 06:24

## 2021-08-10 RX ADMIN — CEFAZOLIN SODIUM 2 G: 100 INJECTION, POWDER, LYOPHILIZED, FOR SOLUTION INTRAVENOUS at 21:17

## 2021-08-10 RX ADMIN — Medication 10 ML: at 05:00

## 2021-08-10 RX ADMIN — MEPERIDINE HYDROCHLORIDE 50 MG: 50 INJECTION INTRAMUSCULAR; INTRAVENOUS; SUBCUTANEOUS at 08:47

## 2021-08-10 RX ADMIN — CEFAZOLIN SODIUM 2 G: 100 INJECTION, POWDER, LYOPHILIZED, FOR SOLUTION INTRAVENOUS at 05:01

## 2021-08-10 NOTE — ADT AUTH CERT NOTES
Foot: Surgical Wound Care - Care Day 3 (8/7/2021) by Jay Omalley       Review Status Review Entered   Completed 8/9/2021 16:15      Criteria Review      Care Day: 3 Care Date: 8/7/2021 Level of Care: Inpatient Floor    Guideline Day 3    Level Of Care    (X) Floor to discharge    8/9/2021 16:15:43 EDT by Lindsey Savage BED    Clinical Status    ( ) * Hemodynamic stability    8/9/2021 16:15:43 EDT by Jay Omalley      VITALS: 98.1, HR 90, RR 17, 91% RA   BP @ 0035: 89/54 (MAP 66)  BP @ 0145: 93/57 (MAP 69)  BP @ 2017: 104/68 (MAP 80)    (X) * Adequate supported ambulation    8/9/2021 16:15:43 EDT by Jay Omalley      pt has been able to mobilize around room using her crutches with no issue. (X) * Wound intact    8/9/2021 16:15:43 EDT by Jay Omalley      Dressing:       wound vac    (X) * Mental status at baseline    8/9/2021 16:15:43 EDT by Jay Omalley      General:         Alert and oriented. No acute distress    (X) * Afebrile or temperature acceptable for next level of care    8/9/2021 16:15:43 EDT by Jay Omalley      TMAX: 98.1    ( ) * Cultures negative or infection identified and under adequate treatment    8/9/2021 16:15:43 EDT by Kg Lyons, op culture with GPC. Mild improvement with pain today but still present.    (X) * Metabolic derangement (eg, dehydration, acidosis) absent    (X) * New end organ dysfunction (eg, myocardial ischemia, renal failure) absent    (X) * No evidence of postoperative or surgical site infection    8/9/2021 16:15:43 EDT by Jay Omalley      Plan:  ·Continue Cefazolin X 6 weeks with EOT 9/16/21  ·Follow OP cx.  Also anaerobic GPC sent to reference lab  ·Still needs PICC placement  ·ID OPAT Orders:  ·Ancef 2 gm IV Q 8 hrs X 6 weeks with EOT 9/16/21  ·Routine PICC care    (X) * Pain absent or managed    8/9/2021 16:15:43 EDT by Jay Omalley      patient who says demerol is controlling pain better  DEMEROL 50MG IV Q4 HRS PRN X6 DOSES    ( ) * Wound care needs acceptable for next level of care    8/9/2021 16:15:43 EDT by Zain Benitez      Discharge likely Monday pending home vac delivery    ( ) * Discharge plans and education understood    Activity    (X) * Ambulatory or acceptable for next level of care    8/9/2021 16:15:43 EDT by Michaelofe Skaggs pt has been able to mobilize around room using her crutches with no issue. Routes    (X) * Oral hydration    ( ) * Oral medications or regimen acceptable for next level of care    8/9/2021 16:15:43 EDT by Maury Cockblake 40MG SC Q24 HRS   ATARAX 25MG PO QD   OXYCODONE IR 5MG PO Q4 HRS PRN X5 DOSES  TRAZODONE 50MG PO QHS   EFFEXOR XR 225MG PO QAM    (X) * Oral diet or acceptable for next level of care    8/9/2021 16:15:43 EDT by Endy Anand DIET    Medications    ( ) * Antimicrobial treatment not necessary or treatment at next level of care arranged    8/9/2021 16:15:43 EDT by Zain Benitez      ANCEF 2G IV Q8 HRS    ( ) * If diabetic, outpatient diabetic medication regimen established    * Milestone   Additional Notes   8/7/2021   LOC -IP-ORTHOPEDICS      ATTENDING (ORTHO) PN Subjective: patient who says demerol is controlling pain better. Mom is asking if this is what she will have upon discharge. Unable to put PICC in today due to allergy to chlorohexidine. Sterile alcohol in storage and will not be able to get it until Monday per mom. PICC placement Monday. Post Op day: 2 Days Post-Op   Procedure: Procedure(s):   INCISION AND DRAINAGE RIGHT FOOT  AND REMOVAL OF HARDWARE, WITH ANTIBIOTIC BEADS  AND WOUND VAC PLACEMENT      Physical Exam    General:         Alert and oriented. No acute distress   Lungs:            Respirations unlabored. Extremities:   No evidence of cyanosis. Calves soft, nontender.                           Moves both upper and lower extremities.     Dressing:       wound vac Neuro:            no deficit      Plan:    Continue current wound vac and care plan    Anticipate Discharge To: HOME once outpatient wound vac arranged and PICC line        INFECTIOUS DISEASE PN Subjective:   Afebrile, op culture with GPC. Mild improvement with pain today but still present. Impression:   · MSSA R foot HW related infection s/p debridement 7/27, debridement with [facility] 8/5   -post burkett procedure several years ago    · Prior MSSA L foot HW infection, removed and treated 2016    Plan:   · Continue Cefazolin X 6 weeks with EOT 9/16/21   · Follow OP cx. Also anaerobic GPC sent to reference lab   · Still needs PICC placement   · ID OPAT Orders:   · Ancef 2 gm IV Q 8 hrs X 6 weeks with EOT 9/16/21   · Routine PICC care   · Q Monday CBC with diff, ESR, CRP, Scr, LFT's   · Please fax results to [phone]   · ID office follow-up to be scheduled for  9/16/21 @ Andres Lloyd likely Monday pending home vac delivery   Anti-infectives:   · Keflex 7/27-8/5   · Cefazolin 8/5-      Physical Therapy Note:   Physical therapy evaluation orders received and chart reviewed. Patient admitted s/p I&D RLE foot, removal of hardware, wound vac placement with Dr. Gerard Glynn. Pt has extensive history of foot/ankle surgeries, she already has all necessary equipment and is experienced and able to maintain NWB RLE. This therapist spoke to mother and pt who verbalize no PT related needs at this time, pt has been able to mobilize around room using her crutches with no issue. They understand to speak to RN who will re-consult therapy if any issues or needs arise prior to DC home. Susan Swain DC PT orders at this time per discussion with pt and mother. No evaluation billed this date.       VITALS: 98.1, HR 90, RR 17, 91% RA    BP @ 0035: 89/54 (MAP 66)   BP @ 0145: 93/57 (MAP 69)   BP @ 2017: 104/68 (MAP 80)      PLAN:   REGULAR DIET   ELEVATE EXTREMITY FOR SWELLING & PAIN CONTROL    UP AD JASMYN

## 2021-08-10 NOTE — PROGRESS NOTES
[facility] FRACTURE PROGRESS NOTE    August 10, 2021  Admit Date:   2021    Post Op day: 5 Days Post-Op    Subjective:    Liliana Verduzco is doing well today. She is in bed resting. Her pain in under control. PT/OT:   Gait:   NWB                  Vital Signs:    Patient Vitals for the past 8 hrs:   BP Temp Pulse Resp SpO2   08/10/21 1028 102/63 98.1 °F (36.7 °C) 77 19 96 %   08/10/21 0731 104/69 98.9 °F (37.2 °C) 78 16 96 %     Temp (24hrs), Av.3 °F (36.8 °C), Min:97.8 °F (36.6 °C), Max:98.9 °F (37.2 °C)      Pain Control:   Pain Assessment  Pain Scale 1: Numeric (0 - 10)  Pain Intensity 1: 7  Pain Onset 1: POST OP  Pain Location 1: Foot  Pain Orientation 1: Right  Pain Description 1: Aching  Pain Intervention(s) 1: Medication (see MAR)    Meds:    Current Facility-Administered Medications   Medication Dose Route Frequency    sodium chloride (NS) flush 10 mL  10 mL InterCATHeter Q8H    sodium chloride (NS) flush 10 mL  10 mL InterCATHeter PRN    meperidine (DEMEROL) injection 50 mg  50 mg IntraVENous Q4H PRN    oxyCODONE IR (ROXICODONE) tablet 5 mg  5 mg Oral Q4H PRN    acetaminophen (TYLENOL) tablet 500 mg  500 mg Oral Q6H PRN    hydrOXYzine HCL (ATARAX) tablet 25 mg  25 mg Oral DAILY    traZODone (DESYREL) tablet 50 mg  50 mg Oral QHS    venlafaxine-SR (EFFEXOR-XR) capsule 225 mg  225 mg Oral 7am    ceFAZolin (ANCEF) 2 g/20 mL in sterile water IV syringe  2 g IntraVENous Q8H    naloxone (NARCAN) injection 0.4 mg  0.4 mg IntraVENous PRN    ondansetron (ZOFRAN) injection 4 mg  4 mg IntraVENous Q4H PRN    enoxaparin (LOVENOX) injection 40 mg  40 mg SubCUTAneous Q24H       LAB:    No results for input(s): HCT, HCTEXT, HGB, HGBEXT, INR, INREXT, HCTEXT, HGBEXT, INREXT in the last 72 hours.     24 Hour Assessment Issues:    Oriented    Discharge Planning: HOME    Assessment & Physician's Comment:  Neurovascular checks within normal limits, dressing is clean and intact with wound vac to suction and patent. Active Problems:    Cellulitis and abscess of foot (8/5/2021)        Plan:  Discharge to home in am- Scripts will be sent to pt's pharmacy.    IV abx at home through PICC per ID recommendations  Wound vac at home with wound care per home health       Keerthi Quispe NP

## 2021-08-10 NOTE — DISCHARGE SUMMARY
[facility] Discharge Summary      Patient ID:  Cathi Rocha  [id number]  89 y.o.  2000    Allergies: Latex, Chlorhexidine, Betadine [povidone-iodine], and Hydrocodone    Admit date: 8/5/2021    Discharge date and time: No discharge date for patient encounter. Admitting Physician: Nataly Rosario MD     Discharge Physician: Ly Maurciio. MD     * Admission Diagnoses: Cellulitis of right lower extremity [L03.115]  Cellulitis and abscess of foot [L03.119, L02.619]    * Discharge Diagnoses:   Hospital Problems as of 8/10/2021 Date Reviewed: 7/9/2021        Codes Class Noted - Resolved POA    Cellulitis and abscess of foot ICD-10-CM: L03.119, L02.619  ICD-9-CM: 682.7  8/5/2021 - Present Unknown              Surgeon: Ly Mauricio MD     * Procedure: Procedure(s):  INCISION AND DRAINAGE RIGHT FOOT  AND REMOVAL OF HARDWARE, WITH ANTIBIOTIC BEADS  AND WOUND VAC PLACEMENT           Perioperative Antibiotics: Ancef  2g ___                                                     Postoperative Pain Management:  Demorol, Roxicodone    DVT Prophylaxis:  None    Post Op complications: none    Hemoglobin at discharge: _11.2,  5 days ago. __    * Discharge Condition: good  Wound appears to be healing without any evidence of infection. Physical Therapy started on the day following surgery and progressed to independent ambulation with the aid of a walker. At the time of discharge, able to go up and down stairs and had understanding of precautions needed following surgery.       * Discharged to: Home    * Follow-up Care/Discharge instructions:  - Resume pre hospital diet            - Resume home medications per medical continuation form     - NWB status at home  - Follow up in office as scheduled       Signed:  Keerthi Quispe NP  8/10/2021  1:15 PM

## 2021-08-10 NOTE — PROGRESS NOTES
Physician Progress Note      PATIENT:               Jaymie Reese  CSN #:                  [id number]  :                       2000  ADMIT DATE:       2021 8:26 AM  DISCH DATE:  RESPONDING  PROVIDER #:        Iftikhar Larsen MD          QUERY TEXT:    Patient admitted for surgery. Per Op note dated 21 documentation of debridement. To accurately reflect the procedure performed please document if debridement was excisional or nonexcisional and the deepest depth of tissue removed as down to and including: The medical record reflects the following:  Risk Factors: Cellulitis of right lower extremity  Clinical Indicators: OR report: \"debridement of bone\"  Treatment: surgery \"Incision and drainage of right foot wound with debridement of bone, removal of hardware, and placement of  absorbable antibiotic beads and negative wound pressure therapy, \"  Options provided:  -- Nonexcisional debridement of bone  -- Excisional debridement of bone  -- Other - I will add my own diagnosis  -- Disagree - Not applicable / Not valid  -- Disagree - Clinically unable to determine / Unknown  -- Refer to Clinical Documentation Reviewer    PROVIDER RESPONSE TEXT:    Excisional debridement of bone of right foot was performed during procedure on 21.     Query created by: Adrián Ceja on 2021 12:13 PM      Electronically signed by:  Iftikhar Larsen MD 8/10/2021 9:22 AM

## 2021-08-11 ENCOUNTER — HOME HEALTH ADMISSION (OUTPATIENT)
Dept: HOME HEALTH SERVICES | Facility: HOME HEALTH | Age: 21
End: 2021-08-11

## 2021-08-11 VITALS
WEIGHT: 130 LBS | RESPIRATION RATE: 18 BRPM | DIASTOLIC BLOOD PRESSURE: 73 MMHG | SYSTOLIC BLOOD PRESSURE: 112 MMHG | HEART RATE: 85 BPM | OXYGEN SATURATION: 100 % | TEMPERATURE: 98.2 F | BODY MASS INDEX: 20.36 KG/M2

## 2021-08-11 PROCEDURE — 77030019934 HC DRSG VAC ASST KCON -B

## 2021-08-11 PROCEDURE — 97605 NEG PRS WND THER DME<=50SQCM: CPT

## 2021-08-11 PROCEDURE — 74011250636 HC RX REV CODE- 250/636: Performed by: ORTHOPAEDIC SURGERY

## 2021-08-11 PROCEDURE — 74011250637 HC RX REV CODE- 250/637: Performed by: ORTHOPAEDIC SURGERY

## 2021-08-11 PROCEDURE — 74011250636 HC RX REV CODE- 250/636: Performed by: INTERNAL MEDICINE

## 2021-08-11 PROCEDURE — 2709999900 HC NON-CHARGEABLE SUPPLY

## 2021-08-11 PROCEDURE — 74011000250 HC RX REV CODE- 250: Performed by: INTERNAL MEDICINE

## 2021-08-11 RX ADMIN — OXYCODONE HYDROCHLORIDE 5 MG: 5 TABLET ORAL at 03:12

## 2021-08-11 RX ADMIN — OXYCODONE HYDROCHLORIDE 5 MG: 5 TABLET ORAL at 07:14

## 2021-08-11 RX ADMIN — HYDROXYZINE HYDROCHLORIDE 25 MG: 25 TABLET, FILM COATED ORAL at 08:18

## 2021-08-11 RX ADMIN — MEPERIDINE HYDROCHLORIDE 50 MG: 50 INJECTION INTRAMUSCULAR; INTRAVENOUS; SUBCUTANEOUS at 09:13

## 2021-08-11 RX ADMIN — OXYCODONE HYDROCHLORIDE 5 MG: 5 TABLET ORAL at 14:59

## 2021-08-11 RX ADMIN — VENLAFAXINE HYDROCHLORIDE 225 MG: 75 CAPSULE, EXTENDED RELEASE ORAL at 08:20

## 2021-08-11 RX ADMIN — Medication 10 ML: at 05:15

## 2021-08-11 RX ADMIN — MEPERIDINE HYDROCHLORIDE 50 MG: 50 INJECTION INTRAMUSCULAR; INTRAVENOUS; SUBCUTANEOUS at 13:23

## 2021-08-11 RX ADMIN — Medication 10 ML: at 15:00

## 2021-08-11 RX ADMIN — OXYCODONE HYDROCHLORIDE 5 MG: 5 TABLET ORAL at 11:00

## 2021-08-11 RX ADMIN — MEPERIDINE HYDROCHLORIDE 50 MG: 50 INJECTION INTRAMUSCULAR; INTRAVENOUS; SUBCUTANEOUS at 05:14

## 2021-08-11 RX ADMIN — CEFAZOLIN SODIUM 2 G: 100 INJECTION, POWDER, LYOPHILIZED, FOR SOLUTION INTRAVENOUS at 05:15

## 2021-08-11 RX ADMIN — VENLAFAXINE HYDROCHLORIDE 225 MG: 75 CAPSULE, EXTENDED RELEASE ORAL at 05:14

## 2021-08-11 RX ADMIN — CEFAZOLIN SODIUM 2 G: 100 INJECTION, POWDER, LYOPHILIZED, FOR SOLUTION INTRAVENOUS at 13:23

## 2021-08-11 RX ADMIN — MEPERIDINE HYDROCHLORIDE 50 MG: 50 INJECTION INTRAMUSCULAR; INTRAVENOUS; SUBCUTANEOUS at 01:14

## 2021-08-11 NOTE — WOUND CARE
Pt seen for wound vac dressing change. Removed old wound vac, home vac in room patient to be discharged home today to have home health start dressing changes MWF. Applied new wound vac dressing, adaptic used over incisional and two pieces of black foam used for this dressing, attached to home vac machine and hospital vac placed in dirty utility room. Pt tolerated well. Answered all questions about home vac to pt's mom and pt. Wound team will continue to follow while in acute care setting.

## 2021-08-11 NOTE — PROGRESS NOTES
Wound RN has seen patient today for final dressing change to home wound vac; all supplies at bedside. IV antibiotic training has been completed by [employer]. Patient will get afternoon dose prior to discharge. Notified by [facility] liaison that they are unable to staff case for skilled nursing. Referral sent to [facility] and liaison notified of need. They have confirmed receipt. Patient and mother updated at bedside. Care Management Interventions  PCP Verified by CM: Yes (Uses Urgent Care for PCP)  Mode of Transport at Discharge: Other (see comment) (Mother)  Transition of Care Consult (CM Consult): Discharge Planning, [address]: No  Reason Outside [place]: Unable to staff case (Referred to Interim)  Discharge Durable Medical Equipment: Yes (Wound Vac)  Physical Therapy Consult: Yes  Occupational Therapy Consult: No  Current Support Network: Relative's Home  Confirm Follow Up Transport: Family  The Plan for Transition of Care is Related to the Following Treatment Goals : Family will discharge home with home health services in order to maintain safe healing environment at home.    Discharge Location  Discharge Placement: Home with home health

## 2021-08-11 NOTE — PROGRESS NOTES
The documentation for this period is being entered following the guidelines as defined in the [facility] policy by Celine George.     Downtime [id number]

## 2021-08-11 NOTE — PROGRESS NOTES
Staff recommended a visit. However, it appears Ms. Vernon has discharged from the hospital.     Richa Bone [facility]  Board Certified

## 2021-08-11 NOTE — PROGRESS NOTES
All of her discharge instructions were discussed at great length with her mother present. All of her concerns were addressed. She has been concerned about her pain management at home, she was not able to get her demerol 100 mg til Monday, but md sent dilaudid 2 mg to her pharmacy. She was concerned that would not be strong enough and had wanted to wait and see md but has changed her mind and will go home at this time. All of her wound vac stuff has been delivered.

## 2021-08-12 ENCOUNTER — HOSPITAL ENCOUNTER (EMERGENCY)
Age: 21
Discharge: HOME | End: 2021-08-13
Attending: STUDENT IN AN ORGANIZED HEALTH CARE EDUCATION/TRAINING PROGRAM
Payer: COMMERCIAL

## 2021-08-12 ENCOUNTER — APPOINTMENT (OUTPATIENT)
Dept: GENERAL RADIOLOGY | Age: 21
End: 2021-08-12
Attending: STUDENT IN AN ORGANIZED HEALTH CARE EDUCATION/TRAINING PROGRAM
Payer: COMMERCIAL

## 2021-08-12 VITALS
HEIGHT: 67 IN | BODY MASS INDEX: 20.4 KG/M2 | HEART RATE: 90 BPM | SYSTOLIC BLOOD PRESSURE: 111 MMHG | WEIGHT: 130 LBS | RESPIRATION RATE: 16 BRPM | OXYGEN SATURATION: 98 % | TEMPERATURE: 98.1 F | DIASTOLIC BLOOD PRESSURE: 80 MMHG

## 2021-08-12 DIAGNOSIS — M79.671 RIGHT FOOT PAIN: Primary | ICD-10-CM

## 2021-08-12 LAB
ALBUMIN SERPL-MCNC: 4 G/DL (ref 3.5–5)
ALBUMIN/GLOB SERPL: 0.9 {RATIO} (ref 1.2–3.5)
ALP SERPL-CCNC: 91 U/L (ref 50–136)
ALT SERPL-CCNC: 57 U/L (ref 12–65)
ANION GAP SERPL CALC-SCNC: 7 MMOL/L (ref 7–16)
AST SERPL-CCNC: 53 U/L (ref 15–37)
BASOPHILS # BLD: 0.1 K/UL (ref 0–0.2)
BASOPHILS NFR BLD: 1 % (ref 0–2)
BILIRUB SERPL-MCNC: 0.3 MG/DL (ref 0.2–1.1)
BUN SERPL-MCNC: 7 MG/DL (ref 6–23)
CALCIUM SERPL-MCNC: 9.2 MG/DL (ref 8.3–10.4)
CHLORIDE SERPL-SCNC: 105 MMOL/L (ref 98–107)
CO2 SERPL-SCNC: 25 MMOL/L (ref 21–32)
CREAT SERPL-MCNC: 0.41 MG/DL (ref 0.6–1)
DIFFERENTIAL METHOD BLD: ABNORMAL
EOSINOPHIL # BLD: 0.2 K/UL (ref 0–0.8)
EOSINOPHIL NFR BLD: 2 % (ref 0.5–7.8)
ERYTHROCYTE [DISTWIDTH] IN BLOOD BY AUTOMATED COUNT: 20.1 % (ref 11.9–14.6)
GLOBULIN SER CALC-MCNC: 4.3 G/DL (ref 2.3–3.5)
GLUCOSE SERPL-MCNC: 115 MG/DL (ref 65–100)
HCG SERPL QL: NEGATIVE
HCT VFR BLD AUTO: 37 % (ref 35.8–46.3)
HGB BLD-MCNC: 11.3 G/DL (ref 11.7–15.4)
IMM GRANULOCYTES # BLD AUTO: 0 K/UL (ref 0–0.5)
IMM GRANULOCYTES NFR BLD AUTO: 0 % (ref 0–5)
LYMPHOCYTES # BLD: 1.3 K/UL (ref 0.5–4.6)
LYMPHOCYTES NFR BLD: 18 % (ref 13–44)
MCH RBC QN AUTO: 24.1 PG (ref 26.1–32.9)
MCHC RBC AUTO-ENTMCNC: 30.5 G/DL (ref 31.4–35)
MCV RBC AUTO: 79.1 FL (ref 79.6–97.8)
MONOCYTES # BLD: 0.5 K/UL (ref 0.1–1.3)
MONOCYTES NFR BLD: 7 % (ref 4–12)
NEUTS SEG # BLD: 5.1 K/UL (ref 1.7–8.2)
NEUTS SEG NFR BLD: 72 % (ref 43–78)
NRBC # BLD: 0 K/UL (ref 0–0.2)
PLATELET # BLD AUTO: 209 K/UL (ref 150–450)
PMV BLD AUTO: 9.7 FL (ref 9.4–12.3)
POTASSIUM SERPL-SCNC: 3.7 MMOL/L (ref 3.5–5.1)
PROT SERPL-MCNC: 8.3 G/DL (ref 6.3–8.2)
RBC # BLD AUTO: 4.68 M/UL (ref 4.05–5.2)
SODIUM SERPL-SCNC: 137 MMOL/L (ref 136–145)
WBC # BLD AUTO: 7.1 K/UL (ref 4.3–11.1)

## 2021-08-12 PROCEDURE — 99283 EMERGENCY DEPT VISIT LOW MDM: CPT

## 2021-08-12 PROCEDURE — 85025 COMPLETE CBC W/AUTO DIFF WBC: CPT

## 2021-08-12 PROCEDURE — 85652 RBC SED RATE AUTOMATED: CPT

## 2021-08-12 PROCEDURE — 96374 THER/PROPH/DIAG INJ IV PUSH: CPT

## 2021-08-12 PROCEDURE — 80053 COMPREHEN METABOLIC PANEL: CPT

## 2021-08-12 PROCEDURE — 74011250636 HC RX REV CODE- 250/636: Performed by: STUDENT IN AN ORGANIZED HEALTH CARE EDUCATION/TRAINING PROGRAM

## 2021-08-12 PROCEDURE — 84703 CHORIONIC GONADOTROPIN ASSAY: CPT

## 2021-08-12 RX ORDER — HYDROMORPHONE HYDROCHLORIDE 1 MG/ML
1 INJECTION, SOLUTION INTRAMUSCULAR; INTRAVENOUS; SUBCUTANEOUS
Status: COMPLETED | OUTPATIENT
Start: 2021-08-12 | End: 2021-08-12

## 2021-08-12 RX ADMIN — HYDROMORPHONE HYDROCHLORIDE 1 MG: 1 INJECTION, SOLUTION INTRAMUSCULAR; INTRAVENOUS; SUBCUTANEOUS at 22:59

## 2021-08-12 NOTE — ED TRIAGE NOTES
Pt arrives via POV c/o infection from metal that was removed from the right foot. States had surgery and just got out yesterday. The dilaudid at home is giving no relief. Recent wound vac placed to the foot.

## 2021-08-13 LAB — ERYTHROCYTE [SEDIMENTATION RATE] IN BLOOD: 18 MM/HR (ref 0–20)

## 2021-08-13 NOTE — ED PROVIDER NOTES
72-year-old female presents to the emergency department complaining of worsening right foot pain. States she has had multiple surgeries on right foot, most recently with hardware removal.  Wound VAC was placed. Discharge from the hospital yesterday. Reports she has not taken any pain medication today. Reports continued worsening pain to her right foot. Denies any trauma. Denies fever or chills. Has been compliant with antibiotics, does have a PICC line in place. Has appoint with her orthopedist tomorrow. Came to the ER for worsening pain. Past Medical History:   Diagnosis Date    Anemia 2019    2 units PRBC's - abscess in jaw    Anxiety     no longer taking meds per pt's mother    CRPS (complex regional pain syndrome)     Depression     daily meds    Gall bladder disease     gall bladder removal     GERD (gastroesophageal reflux disease)     resolved per patient- no current meds 2/8/21    Left foot pain     PONV (postoperative nausea and vomiting)     with first oral surgery under general- none since       Past Surgical History:   Procedure Laterality Date    HX CYST REMOVAL Right 2016    ganglion cyst right hand     HX HEENT      dental surgery for root canal and crown    HX LAP CHOLECYSTECTOMY  2018    HX ORTHOPAEDIC Left age 8    left foot.  extra bone removed     HX ORTHOPAEDIC Right age 6    right foot , extra bone removed,     HX ORTHOPAEDIC Left age 15    left foot surgery with hardware    HX ORTHOPAEDIC Left age 15    left surgery surgery to remove screw    HX ORTHOPAEDIC      right ankle     HX ORTHOPAEDIC  06/2016    Left foot painful hardware with subtalar joint    HX ORTHOPAEDIC Left 2016    I&D foot         Family History:   Problem Relation Age of Onset    No Known Problems Mother     Cancer Father         skin cancer    No Known Problems Sister     No Known Problems Brother     No Known Problems Sister     No Known Problems Sister        Social History Socioeconomic History    Marital status: SINGLE     Spouse name: Not on file    Number of children: Not on file    Years of education: Not on file    Highest education level: Not on file   Occupational History    Not on file   Tobacco Use    Smoking status: Never Smoker    Smokeless tobacco: Never Used   Vaping Use    Vaping Use: Never used   Substance and Sexual Activity    Alcohol use: No    Drug use: No    Sexual activity: Not on file   Other Topics Concern    Not on file   Social History Narrative    Not on file     Social Determinants of Health     Financial Resource Strain:     Difficulty of Paying Living Expenses:    Food Insecurity:     Worried About Running Out of Food in the Last Year:     [address] in the Last Year:    Transportation Needs:     Lack of Transportation (Medical):  Lack of Transportation (Non-Medical):    Physical Activity:     Days of Exercise per Week:     Minutes of Exercise per Session:    Stress:     Feeling of Stress :    Social Connections:     Frequency of Communication with Friends and Family:     Frequency of Social Gatherings with Friends and Family:     Attends Muslim Services:     Active Member of Clubs or Organizations:     Attends Club or Organization Meetings:     Marital Status:    Intimate Partner Violence:     Fear of Current or Ex-Partner:     Emotionally Abused:     Physically Abused:     Sexually Abused: ALLERGIES: Latex, Chlorhexidine, Betadine [povidone-iodine], and Hydrocodone    Review of Systems   Constitutional: Negative for chills and fever. HENT: Negative for sinus pressure and sore throat. Eyes: Negative for visual disturbance. Respiratory: Negative for cough and shortness of breath. Cardiovascular: Negative for chest pain. Gastrointestinal: Negative for abdominal pain, diarrhea, nausea and vomiting. Endocrine: Negative for polyuria.    Genitourinary: Negative for difficulty urinating and dysuria. Musculoskeletal: Positive for arthralgias. Negative for neck pain and neck stiffness. Skin: Negative for rash. Neurological: Negative for weakness and headaches. Psychiatric/Behavioral: Negative for confusion. All other systems reviewed and are negative. Vitals:    08/12/21 1856   BP: 109/74   Pulse: (!) 109   Resp: 16   Temp: 98.1 °F (36.7 °C)   SpO2: 98%   Weight: 59 kg (130 lb)   Height: 5' 7\" (1.702 m)            Physical Exam  Vitals and nursing note reviewed. Constitutional:       Appearance: Normal appearance. She is not ill-appearing or toxic-appearing. HENT:      Head: Normocephalic and atraumatic. Nose: Nose normal.      Mouth/Throat:      Mouth: Mucous membranes are moist.   Eyes:      Extraocular Movements: Extraocular movements intact. Cardiovascular:      Pulses: Normal pulses. Heart sounds: Normal heart sounds. Pulmonary:      Effort: Pulmonary effort is normal. No respiratory distress. Abdominal:      General: Abdomen is flat. Musculoskeletal:         General: Tenderness present. Normal range of motion. Cervical back: Normal range of motion. No rigidity. Comments: Right foot: Wound VAC in place to the lateral aspect of the foot, pain with minimal superficial palpation, no surrounding erythema around the wound VAC. No deformity noted. Skin:     General: Skin is warm and dry. Neurological:      General: No focal deficit present. Mental Status: She is alert and oriented to person, place, and time. Psychiatric:         Mood and Affect: Mood normal.          MDM  Number of Diagnoses or Management Options  Right foot pain  Diagnosis management comments: 20-year-old female presents ER with continued right foot pain. Patient's had multiple surgeries, most recently with hardware removal and wound VAC placement. Patient presents with worsening pain, will give 1 mg IV Dilaudid.   Patient denies nausea so no additional antiemetics will be given with this. Will obtain basic labs as well. Through candid conversation, Elijah Gan was educating patient on her having caution with continued narcotic pain medication usage. Patient has been taking p.o. Dilaudid with no relief. I was discussing with patient and mother, the patient's need to be careful to not become dependent on opioids. Patient became very upset at this time after I suggested this. I by no means suggested patient was addicted to narcotics but merely wanted to caution patient giving continued and potential chronic usage of opiates. Labs are normal white count, stable H&H, normal electrolytes and normal kidney function, normal liver enzyme, sed rate was 18. X-ray had been ordered but patient refused. Patient has remained stable be discharged home. Unfortunately patient left prior to me informing her of her results. She will see orthopedics tomorrow as previously scheduled.        Amount and/or Complexity of Data Reviewed  Clinical lab tests: ordered and reviewed  Decide to obtain previous medical records or to obtain history from someone other than the patient: yes  Review and summarize past medical records: yes    Risk of Complications, Morbidity, and/or Mortality  Presenting problems: moderate  Diagnostic procedures: moderate  Management options: moderate           Procedures

## 2021-08-13 NOTE — DISCHARGE INSTRUCTIONS
Follow-up with orthopedics as previously scheduled. Continue taking your pain medication as prescribed. Return to the ER for worsening or worrisome symptoms.

## 2021-08-15 ENCOUNTER — HOSPITAL ENCOUNTER (EMERGENCY)
Age: 21
Discharge: HOME | DRG: 556 | End: 2021-08-15
Attending: EMERGENCY MEDICINE
Payer: COMMERCIAL

## 2021-08-15 VITALS
WEIGHT: 130 LBS | RESPIRATION RATE: 20 BRPM | HEART RATE: 96 BPM | BODY MASS INDEX: 20.4 KG/M2 | TEMPERATURE: 98.3 F | DIASTOLIC BLOOD PRESSURE: 72 MMHG | HEIGHT: 67 IN | OXYGEN SATURATION: 100 % | SYSTOLIC BLOOD PRESSURE: 106 MMHG

## 2021-08-15 DIAGNOSIS — G89.4 CHRONIC PAIN SYNDROME: Primary | ICD-10-CM

## 2021-08-15 PROCEDURE — 99282 EMERGENCY DEPT VISIT SF MDM: CPT

## 2021-08-15 RX ORDER — HYDROMORPHONE HYDROCHLORIDE 2 MG/1
TABLET ORAL
COMMUNITY
End: 2021-08-25

## 2021-08-15 NOTE — DISCHARGE INSTRUCTIONS
Chronic Pain Management    Due to the highly addictive nature of narcotic medicine and our inability to follow your use and needs effectively, we do not feel it is safe for you to be prescribed narcotics through the emergency department for your own safety. For the best management of chronic pain, a primary care provider or a pain specialist who knows your medical situation is your best option for relief of pain. If your primary care provider does not feel comfortable prescribing such medications, you need to discuss a referral to a pain management specialist. Most specialists will not take a referral from the emergency room. This must come from your primary care provider. Here is a list of some practices that specialize in pain management.     [facility]  [id number]  [place], [address] [facility]  [address], [address]  [address]  [address] [place] [address], [address] [place] [address]    [facility] (3 locations)   [address]  [address]    [facility]                                            [address]  [phone]          [address] [place] [address]  [phone]

## 2021-08-15 NOTE — ED TRIAGE NOTES
Pt had I&D on R foot last week. She has Dilaudid tabs to take at home but it is not relieving her pain. Pt has PICC line in place for infection.

## 2021-08-15 NOTE — ED PROVIDER NOTES
57-year-old female presents with persistent right foot pain since surgery 8/5 despite Dilaudid, oxycodone, and recent prescription for Demerol. No new injuries or fever. 8/13 ortho note:  \"Date of Procedure: 8/5/2021        Subjective: Brian Steiner and her mother present back to the office today for her previously scheduled follow-up. She has had significant pain control issues postoperatively as well as in the hospital.  There was a significant lack of understanding in the discharge planning for her from the hospital despite our best efforts with she and her mother that she is unable to use multiple narcotics at once. She did go back to the ER last night. She had labs performed in the emergency room which showed a sedimentation rate of 18 and a normal white count and stable vital signs with no tachycardia or hypertension. Her main complaint today is pain. Her mother has brought research and suggesting that she may have some sort of genetic predisposition that opioids would not work for her. She did state that the Dilaudid in the IV that she got in the ER did make her pain go from a 9 down to a 5. Assessment:   Postoperative pain status post incision and drainage with placement of antibiotic beads in the right foot     Plan:        Treatment at this time:   Weight-bearing status: non-weight bearing   Rx Given: none         Studies ordered today: none  Return to work/work restrictions: Follow up:      Brian Steiner her mother and I have talked for about 30 min in the office today. I reviewed the emergency room doctor's note with him as they left before her lab results became known to her last night. Nothing about her physical examination appears toxic in nature. She sits up in the office in no acute distress except when discussing her pain control issues. She is able to carry on a lucid conversation. She sits quietly in the office. We've talked about treatment options for this.   Apparently she is going to get her wound VAC change later today by home health wound care as she will not leave town and considers her self homebound at this point. I told her I thought that that was a good idea. We discussed many treatment options from here. The patient and her mother today of asked me about fentanyl patches or lollipops. I told him I have absolutely no knowledge or comfort in prescribing these. Of note she did previously go to pain management but did not like her pain management doctor. I told him that getting in emergently consult for pain management doctor is probably not practical at this point. The mother did mention to me that she is going to try to contact her pain management physicians group and see if they can see another person in this group. I told him that would be a good path forward. Additionally we talked about other treatment options today. She was quite upset at the concept that she may be narcotic Jesi Bue dependent at this point. The ER doctor also noted this in her note. I have told him I have a great fear and given her more narcotics than this and causing respiratory depression in the nonmonitored setting. We also discussed the potential of admission to the hospital which would allow her to get IV pain medicine again as this seems to work better than oral medication. I told him that I did know there insurance company would actually pay for this and it may cause him to go in a great bit as I related this it is medically necessary. Also told her that she would be the first patient of my 20-year practice who had to be readmitted for postop pain control this nature. Also told her that should she get admitted to the hospital that I would consult mental health services. They've again asked about having an amputation done today.   I told her that in her current mode of desperation that I would not recommend making such a rash decision given the fact that she may build to keep her foot and be a lot more functional.  Also told the patient and her mother that I would request a psychiatric evaluation and probably open opinions from other doctors before considering the amputation to see if they agreed with such a pathway in such a dire surgical option for her problem that seems quite treatable at the moment. After discussing all this they would like to try hospital admission. I expressed my concerns about this pathway as the Covid epidemic explodes again and there are very few beds available. I did warn him that it may be multiple days if ever before they're able to get a bed given my recent experience in the hospital.  I told him they have to be prepared for this eventuality. They seemed understanding of this fact. Of note apparently there is some oral Demerol in order for her at a pharmacy which will be there on Monday. One wonders if this not might not benefit her more than anything else. \"         Foot Pain          Past Medical History:   Diagnosis Date    Anemia 2019    2 units PRBC's - abscess in jaw    Anxiety     no longer taking meds per pt's mother    CRPS (complex regional pain syndrome)     Depression     daily meds    Gall bladder disease     gall bladder removal     GERD (gastroesophageal reflux disease)     resolved per patient- no current meds 2/8/21    Left foot pain     PONV (postoperative nausea and vomiting)     with first oral surgery under general- none since       Past Surgical History:   Procedure Laterality Date    HX CYST REMOVAL Right 2016    ganglion cyst right hand     HX HEENT      dental surgery for root canal and crown    HX LAP CHOLECYSTECTOMY  2018    HX ORTHOPAEDIC Left age 8    left foot.  extra bone removed     HX ORTHOPAEDIC Right age 6    right foot , extra bone removed,     HX ORTHOPAEDIC Left age 15    left foot surgery with hardware    HX ORTHOPAEDIC Left age 15    left surgery surgery to remove screw    HX ORTHOPAEDIC      right ankle     HX ORTHOPAEDIC 06/2016    Left foot painful hardware with subtalar joint    HX ORTHOPAEDIC Left 2016    I&D foot         Family History:   Problem Relation Age of Onset    No Known Problems Mother     Cancer Father         skin cancer    No Known Problems Sister     No Known Problems Brother     No Known Problems Sister     No Known Problems Sister        Social History     Socioeconomic History    Marital status: SINGLE     Spouse name: Not on file    Number of children: Not on file    Years of education: Not on file    Highest education level: Not on file   Occupational History    Not on file   Tobacco Use    Smoking status: Never Smoker    Smokeless tobacco: Never Used   Vaping Use    Vaping Use: Never used   Substance and Sexual Activity    Alcohol use: No    Drug use: No    Sexual activity: Not on file   Other Topics Concern    Not on file   Social History Narrative    Not on file     Social Determinants of Health     Financial Resource Strain:     Difficulty of Paying Living Expenses:    Food Insecurity:     Worried About Running Out of Food in the Last Year:     Ran Out of Food in the Last Year:    Transportation Needs:     Lack of Transportation (Medical):  Lack of Transportation (Non-Medical):    Physical Activity:     Days of Exercise per Week:     Minutes of Exercise per Session:    Stress:     Feeling of Stress :    Social Connections:     Frequency of Communication with Friends and Family:     Frequency of Social Gatherings with Friends and Family:     Attends Spiritism Services:     Active Member of Clubs or Organizations:     Attends Club or Organization Meetings:     Marital Status:    Intimate Partner Violence:     Fear of Current or Ex-Partner:     Emotionally Abused:     Physically Abused:     Sexually Abused: ALLERGIES: Latex, Chlorhexidine, Betadine [povidone-iodine], and Hydrocodone    Review of Systems   Constitutional: Negative for fever.    Musculoskeletal: Positive for arthralgias. Skin: Positive for wound. All other systems reviewed and are negative. Vitals:    08/15/21 1815   BP: 106/72   Pulse: 96   Resp: 20   Temp: 98.3 °F (36.8 °C)   SpO2: 100%   Weight: 59 kg (130 lb)   Height: 5' 7\" (1.702 m)            Physical Exam  Vitals and nursing note reviewed. Constitutional:       Appearance: Normal appearance. HENT:      Head: Normocephalic and atraumatic. Nose: Nose normal.      Mouth/Throat:      Mouth: Mucous membranes are moist.   Eyes:      Pupils: Pupils are equal, round, and reactive to light. Musculoskeletal:      Comments: Wound VAC right foot   Skin:     Findings: No erythema. Neurological:      General: No focal deficit present. Mental Status: She is alert. Psychiatric:         Mood and Affect: Affect is angry. Behavior: Behavior is withdrawn. MDM  Number of Diagnoses or Management Options  Chronic pain syndrome  Diagnosis management comments: Parts of this document were created using dragon voice recognition software. The chart has been reviewed but errors may still be present. I wore appropriate PPE throughout this patient's ED visit. Shahriar Hartley MD, 7:37 PM    Had long conversation with patient and mother, both of which were very unhappy that I was not giving her further IV or IM opiates today. Patient's glares at me and states \"I guess you cant help me either then can you. \"  Mother states she has an appointment with a new pain management specialist in [place] in 2 days because her previous pain management specialist \"got fired and was useless. \"         Procedures

## 2021-08-15 NOTE — ED NOTES
This nurse took discharge papers and nurse on a stick to pt bed to discharge pt. Pt and mother already gone. Pt had needed extensive assistance getting from personal wheelchair to stretcher. When I arrived at stretcher side of bed was still up. No vitals obrained and no discharge papers given.

## 2021-08-18 ENCOUNTER — HOSPITAL ENCOUNTER (INPATIENT)
Age: 21
LOS: 7 days | Discharge: HOME HEALTH | DRG: 556 | End: 2021-08-25
Attending: ORTHOPAEDIC SURGERY | Admitting: ORTHOPAEDIC SURGERY
Payer: COMMERCIAL

## 2021-08-18 DIAGNOSIS — L02.619 CELLULITIS AND ABSCESS OF FOOT: Primary | ICD-10-CM

## 2021-08-18 DIAGNOSIS — L03.119 CELLULITIS AND ABSCESS OF FOOT: Primary | ICD-10-CM

## 2021-08-18 PROBLEM — M79.671 RIGHT FOOT PAIN: Status: ACTIVE | Noted: 2021-08-18

## 2021-08-18 LAB
BACTERIA SPEC CULT: ABNORMAL
SERVICE CMNT-IMP: ABNORMAL

## 2021-08-18 PROCEDURE — 65270000029 HC RM PRIVATE

## 2021-08-18 RX ORDER — NALOXONE HYDROCHLORIDE 0.4 MG/ML
0.4 INJECTION, SOLUTION INTRAMUSCULAR; INTRAVENOUS; SUBCUTANEOUS
Status: DISCONTINUED | OUTPATIENT
Start: 2021-08-18 | End: 2021-08-25 | Stop reason: HOSPADM

## 2021-08-18 RX ORDER — SODIUM CHLORIDE 0.9 % (FLUSH) 0.9 %
5-40 SYRINGE (ML) INJECTION AS NEEDED
Status: DISCONTINUED | OUTPATIENT
Start: 2021-08-18 | End: 2021-08-25 | Stop reason: HOSPADM

## 2021-08-18 RX ORDER — HYDROMORPHONE HYDROCHLORIDE 1 MG/ML
.5-1 INJECTION, SOLUTION INTRAMUSCULAR; INTRAVENOUS; SUBCUTANEOUS
Status: DISCONTINUED | OUTPATIENT
Start: 2021-08-18 | End: 2021-08-24

## 2021-08-18 RX ORDER — SODIUM CHLORIDE 9 MG/ML
100 INJECTION, SOLUTION INTRAVENOUS CONTINUOUS
Status: DISCONTINUED | OUTPATIENT
Start: 2021-08-19 | End: 2021-08-25 | Stop reason: HOSPADM

## 2021-08-18 RX ORDER — ACETAMINOPHEN 325 MG/1
325 TABLET ORAL
Status: DISCONTINUED | OUTPATIENT
Start: 2021-08-18 | End: 2021-08-25 | Stop reason: HOSPADM

## 2021-08-18 RX ORDER — AMOXICILLIN 250 MG
1 CAPSULE ORAL
Status: DISCONTINUED | OUTPATIENT
Start: 2021-08-18 | End: 2021-08-25 | Stop reason: HOSPADM

## 2021-08-18 RX ORDER — SODIUM CHLORIDE 0.9 % (FLUSH) 0.9 %
5-40 SYRINGE (ML) INJECTION EVERY 8 HOURS
Status: DISCONTINUED | OUTPATIENT
Start: 2021-08-19 | End: 2021-08-25 | Stop reason: HOSPADM

## 2021-08-18 RX ORDER — ONDANSETRON 2 MG/ML
4 INJECTION INTRAMUSCULAR; INTRAVENOUS
Status: DISCONTINUED | OUTPATIENT
Start: 2021-08-18 | End: 2021-08-25 | Stop reason: HOSPADM

## 2021-08-18 RX ORDER — DIPHENHYDRAMINE HCL 25 MG
25 CAPSULE ORAL
Status: DISCONTINUED | OUTPATIENT
Start: 2021-08-18 | End: 2021-08-25 | Stop reason: HOSPADM

## 2021-08-19 PROCEDURE — 97530 THERAPEUTIC ACTIVITIES: CPT

## 2021-08-19 PROCEDURE — 97161 PT EVAL LOW COMPLEX 20 MIN: CPT

## 2021-08-19 PROCEDURE — 74011250637 HC RX REV CODE- 250/637: Performed by: PHYSICIAN ASSISTANT

## 2021-08-19 PROCEDURE — 74011250636 HC RX REV CODE- 250/636: Performed by: NURSE PRACTITIONER

## 2021-08-19 PROCEDURE — 74011000250 HC RX REV CODE- 250: Performed by: NURSE PRACTITIONER

## 2021-08-19 PROCEDURE — 74011250636 HC RX REV CODE- 250/636: Performed by: PHYSICIAN ASSISTANT

## 2021-08-19 PROCEDURE — 77030019952 HC CANSTR VAC ASST KCON -B

## 2021-08-19 PROCEDURE — 65270000029 HC RM PRIVATE

## 2021-08-19 RX ORDER — CEFAZOLIN SODIUM/WATER 2 G/20 ML
2 SYRINGE (ML) INTRAVENOUS ONCE
Status: COMPLETED | OUTPATIENT
Start: 2021-08-19 | End: 2021-08-19

## 2021-08-19 RX ORDER — HYDROMORPHONE HCL IN WATER/PF 1 MG/ML
SYRINGE (ML) INJECTION
Status: DISCONTINUED | OUTPATIENT
Start: 2021-08-19 | End: 2021-08-20

## 2021-08-19 RX ADMIN — ACETAMINOPHEN 325 MG: 325 TABLET ORAL at 22:33

## 2021-08-19 RX ADMIN — HYDROMORPHONE HYDROCHLORIDE 1 MG: 1 INJECTION, SOLUTION INTRAMUSCULAR; INTRAVENOUS; SUBCUTANEOUS at 08:46

## 2021-08-19 RX ADMIN — HYDROMORPHONE HYDROCHLORIDE 1 MG: 1 INJECTION, SOLUTION INTRAMUSCULAR; INTRAVENOUS; SUBCUTANEOUS at 23:58

## 2021-08-19 RX ADMIN — HYDROMORPHONE HYDROCHLORIDE 1 MG: 1 INJECTION, SOLUTION INTRAMUSCULAR; INTRAVENOUS; SUBCUTANEOUS at 01:12

## 2021-08-19 RX ADMIN — HYDROMORPHONE HYDROCHLORIDE 1 MG: 1 INJECTION, SOLUTION INTRAMUSCULAR; INTRAVENOUS; SUBCUTANEOUS at 17:45

## 2021-08-19 RX ADMIN — CEFAZOLIN 2 G: 10 INJECTION, POWDER, FOR SOLUTION INTRAVENOUS at 08:43

## 2021-08-19 RX ADMIN — Medication: at 10:12

## 2021-08-19 RX ADMIN — HYDROMORPHONE HYDROCHLORIDE 1 MG: 1 INJECTION, SOLUTION INTRAMUSCULAR; INTRAVENOUS; SUBCUTANEOUS at 20:54

## 2021-08-19 RX ADMIN — Medication 10 ML: at 15:50

## 2021-08-19 RX ADMIN — SODIUM CHLORIDE 100 ML/HR: 900 INJECTION, SOLUTION INTRAVENOUS at 20:56

## 2021-08-19 RX ADMIN — Medication 10 ML: at 20:54

## 2021-08-19 RX ADMIN — HYDROMORPHONE HYDROCHLORIDE 1 MG: 1 INJECTION, SOLUTION INTRAMUSCULAR; INTRAVENOUS; SUBCUTANEOUS at 04:27

## 2021-08-19 RX ADMIN — SODIUM CHLORIDE 100 ML/HR: 900 INJECTION, SOLUTION INTRAVENOUS at 00:00

## 2021-08-19 RX ADMIN — HYDROMORPHONE HYDROCHLORIDE 1 MG: 1 INJECTION, SOLUTION INTRAMUSCULAR; INTRAVENOUS; SUBCUTANEOUS at 13:46

## 2021-08-19 RX ADMIN — Medication 10 ML: at 05:39

## 2021-08-19 NOTE — PROGRESS NOTES
BSN, JOSE CRUZ met with pt at bedside with appropriate PPE and social distancing. Pt lying in bed. Pt alert and oriented to person, place, time, and situation. Pt verified demographic information. Pt has no PCP but [facility] assisting get home PCP as pt home bound at this time with current medical issues. Pt lives in 2 story home with parents and siblings, 5 steps to enter into the home and pt bedroom on main level. Pt reports being independent with ADLs and driving prior to admission on 8/5/2021. Pt reports having DMEs such as crutches and wheelchair. Pt family able to transport home, to doctor apt,  medications, and get groceries. Pt primary pharmacy is [facility]. Pt reports being able to afford medications. Pt reports plans to discharge home and resume [facility] and [facility] services with RN (wound vac) and infusions (Ancef till 9/16/2021). Primary RN aware and reports she will let attending provider know about ABT to resume during hospital stay. Pt never been to SNF. Will assist to assure [facility] and [facility] resumed at d/c. Readmission assessment complete. Pt saw pt and recommends no skilled needs. CM to continue to follow and monitor for any needs that may occur. Care Management Interventions  PCP Verified by CM: No (pt reports [facility] assisting set up Home PCP)  Mode of Transport at Discharge:  Other (see comment)  Transition of Care Consult (CM Consult): Discharge Planning, [address]: No  Reason Outside [place]: Patient already serviced by other home care/hospice agency  Discharge Durable Medical Equipment: No  Physical Therapy Consult: Yes  Occupational Therapy Consult: No  Speech Therapy Consult: No  Current Support Network: Relative's Home (lives with parents and siblings )  Confirm Follow Up Transport: Family  The Plan for Transition of Care is Related to the Following Treatment Goals : return to baseline   The Patient and/or Patient Representative was Provided with a Choice of Provider and Agrees with the Discharge Plan?: Yes  Name of the Patient Representative Who was Provided with a Choice of Provider and Agrees with the Discharge Plan: pt   Freedom of Choice List was Provided with Basic Dialogue that Supports the Patient's Individualized Plan of Care/Goals, Treatment Preferences and Shares the Quality Data Associated with the Providers?: Yes   Resource Information Provided?: Yes  Discharge Location  Discharge Placement: Home with home health (Resume [facility] )     Readmission Assessment  Number of days since last admission?: 1-7 days  Previous disposition: Home with Home Health  Who is being interviewed?: Patient  What was the patient's/caregiver's perception as to why they think they needed to return back to the hospital?: Other (Comment) (pain)  Did you visit your Primary Care Physician after you left the hospital, before you returned this time?: No  Why weren't you able to visit your PCP?: Other (Comment) (no PCP)  Did you see a specialist, such as Cardiac, Pulmonary, Orthopedic Physician, etc. after you left the hospital?: Yes  Who advised the patient to return to the hospital?: Physician  Does the patient report anything that got in the way of taking their medications?: No  In our efforts to provide the best possible care to you and others like you, can you think of anything that we could have done to help you after you left the hospital the first time, so that you might not have needed to return so soon?: Other (Comment) (denny she should not have been d/c from IV pain medication to po to d/c so quickly at last d/c)

## 2021-08-19 NOTE — PROGRESS NOTES
08/18/21 2150   Dual Skin Pressure Injury Assessment   Dual Skin Pressure Injury Assessment WDL   Second Care Provider (Based on Facility Policy) Theron Damon RN   Skin Integumentary   Skin Integumentary (WDL) X    Pressure  Injury Documentation No Pressure Injury Noted-Pressure Ulcer Prevention Initiated   Skin Color Ecchymosis (comment)  (Scattered. )   Skin Condition/Temp Dry;Fragile; Warm   Skin Integrity Scars (comment); Tattoos (comment); Other (comment)  (Scattered scars. Tattoo to L wrist. Areas to BLE. )   Turgor Non-tenting   Hair Growth Present   Nails WDL   Varicosities Absent   Wound Prevention and Protection Methods   Orientation of Wound Prevention Posterior   Location of Wound Prevention Sacrum/Coccyx   Wound Offloading (Prevention Methods) Bed, pressure reduction mattress;Pillows;Repositioning     Sacral area intact. Heels intact. Abrasion to BLE. Scars to BLE, right palm, abdomen. Scattered scars. Scattered moles and ecchymosis. Incision to Right foot with home wound vac. Tattoo to Left wrist. Mucous membranes pink, moist, intact. Capillary refill <3 seconds. Skin turgor good.

## 2021-08-19 NOTE — PROGRESS NOTES
This RN spoke with on-call physician, Dr. Juany Mesa, regarding orders. Dr. Juany Mesa to review chart and place orders.

## 2021-08-19 NOTE — H&P
Outpatient Surgery History and Physical:  Licha Yin was seen and examined. 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    Cellulitis of left jaw    Microcytic anemia    Menorrhagia with regular cycle    Depression     daily meds      Sepsis ([address]    Right upper lobe pulmonary infiltrate    Cholecystitis    Parent refuses immunizations    Scoliosis (managed by [facility] [place] [facility])    S/P foot surgery, left (surgeries for \"flat feet\" - under care of POA)       Surgical History:   Past Surgical History:   Procedure Laterality Date    HX CYST REMOVAL Right 2016    ganglion cyst right hand     HX HEENT      dental surgery for root canal and crown    HX LAP CHOLECYSTECTOMY  2018    HX ORTHOPAEDIC Left age 8    left foot. extra bone removed     HX ORTHOPAEDIC Right age 6    right foot , extra bone removed,     HX ORTHOPAEDIC Left age 15    left foot surgery with hardware    HX ORTHOPAEDIC Left age 15    left surgery surgery to remove screw    HX ORTHOPAEDIC      right ankle     HX ORTHOPAEDIC  06/2016    Left foot painful hardware with subtalar joint    HX ORTHOPAEDIC Left 2016    I&D foot       Social History: Patient  reports that she has never smoked. She has never used smokeless tobacco. She reports that she does not drink alcohol and does not use drugs.     Family History:   Family History   Problem Relation Age of Onset    No Known Problems Mother     Cancer Father         skin cancer    No Known Problems Sister     No Known Problems Brother     No Known Problems Sister     No Known Problems Sister        Allergies: Reviewed per EMR  Allergies   Allergen Reactions    Latex Other (comments)     Redness at site     Chlorhexidine Rash    Betadine [Povidone-Iodine] Hives     Itching, severe    Hydrocodone Itching       Medications:    No current facility-administered medications on file prior to encounter. Current Outpatient Medications on File Prior to Encounter   Medication Sig    HYDROmorphone (Dilaudid) 2 mg tablet Take  by mouth every four (4) hours as needed for Pain.  traZODone (DESYREL) 50 mg tablet TAKE 1 TABLET BY MOUTH EVERY NIGHT AT BEDTIME    venlafaxine-SR (EFFEXOR-XR) 75 mg capsule Take 225 mg by mouth every morning.  acetaminophen (Tylenol Extra Strength) 500 mg tablet Take 500 mg by mouth every six (6) hours as needed for Pain.  hydrOXYzine HCl (ATARAX) 25 mg tablet Take 25 mg by mouth daily. This patient is admitted for postop pain control. She will be placed on a Dilaudid PCA. I discussed with her outpatient pain management doctor the team will titrate the medication for this and then we can use this data for him to treat her pain as an outpatient basis once we know what her narcotic need is. She will continue with her Ancef per infectious disease. She will also continue with wound VAC changes while she is here with wound care gesture she would have done as an outpatient. She is in agreement with this plan.     Signed By: Keya Rodrigez MD     August 19, 2021 4:04 PM

## 2021-08-19 NOTE — PROGRESS NOTES
Called regarding direct admit of patient. Reviewed recent history with Dr Ritika Love and patient. Patient being admitted for IV pain medication and pain control. Will give general orders for overnight. Dr. Ritika Love to address further in AM.  IV dilaudid ordered as this seemed to help in the past.  Narcan ordered for patient as well. Further orders to be placed by Dr. Ritika Love in AM.  Will not adjust pain medication overnight.

## 2021-08-19 NOTE — PROGRESS NOTES
Problem: Falls - Risk of  Goal: *Absence of Falls  Description: Document Wyatt Reason Fall Risk and appropriate interventions in the flowsheet.   Outcome: Progressing Towards Goal  Note: Fall Risk Interventions:  Mobility Interventions: Bed/chair exit alarm, OT consult for ADLs, Patient to call before getting OOB, PT Consult for mobility concerns         Medication Interventions: Bed/chair exit alarm, Patient to call before getting OOB, Teach patient to arise slowly    Elimination Interventions: Call light in reach, Bed/chair exit alarm              Problem: Patient Education: Go to Patient Education Activity  Goal: Patient/Family Education  Outcome: Progressing Towards Goal

## 2021-08-19 NOTE — PROGRESS NOTES
ACUTE PHYSICAL THERAPY GOALS:  (Developed with and agreed upon by patient and/or caregiver. )  LTG:  (1.)Ms. Vernon will move from supine to sit and sit to supine , scoot up and down and roll side to side in bed with INDEPENDENT within 1 treatment day(s). GOAL MET 8/19/2021  (2.)Ms. Vernon will transfer from bed to chair and chair to bed with MODIFIED INDEPENDENCE using the least restrictive device within 1 treatment day(s). GOAL MET 8/19/2021  (3.)Ms. Vernon will ambulate with MODIFIED INDEPENDENCE for 10+ feet with the least restrictive device within 1 treatment day(s), NWB R LE. GOAL MET 8/19/2021   ________________________________________________________________________________________________       PHYSICAL THERAPY ASSESSMENT: Initial Assessment, Discharge and AM PT Treatment Day # 1     NWB R YVAN      Alix Pinto is a 24 y.o. female   PRIMARY DIAGNOSIS: <principal problem not specified>  Right foot pain [M79.671]       Reason for Referral:    ICD-10: Treatment Diagnosis: Other abnormalities of gait and mobility (R26.89)  INPATIENT: Payor: [employer] / Plan: [place] [employer] / Product Type: Commerical /     ASSESSMENT:     REHAB RECOMMENDATIONS:   Recommendation to date pending progress:  Setting:   No further skilled therapy   Equipment:    None     PRIOR LEVEL OF FUNCTION:  (Prior to Hospitalization) INITIAL/CURRENT LEVEL OF FUNCTION:  (Most Recently Demonstrated)   Bed Mobility:   Independent  Sit to Stand:   Modified Independent  Transfers:   Modified Independent  Gait/Mobility:   Modified Independent Bed Mobility:   Independent  Sit to Stand:   Modified Independent  Transfers:   Modified Independent  Gait/Mobility:   Modified Independent     ASSESSMENT:  Ms. Roger Mcgraw is a 24 y.o female presenting to hospital s/p R foot surgery with post op complications from infections. Pt NWB R LE, wound vac.  Pt reports has been using crutches at home for mobility, has been NWB at home with portable wound vac. Pt lives with parents and siblings. Pt today independent with bed mobility, mod I with use of RW due to size of wound vac with transfers and ambulation. Pt demonstrates good static and dynamic standing balance with RW, able to maintain NWB R LE with transfers and ambulation. Pt with no further skilled PT needs at this time. Pt recommended to use RW while in hospital due to size of wound vac for easier portability. SUBJECTIVE:   Ms. Saint Gong states, \"I have been doing this for a long time. \"    SOCIAL HISTORY/LIVING ENVIRONMENT: lives with parents and siblings, crutches at baseline     OBJECTIVE:     PAIN: VITAL SIGNS: LINES/DRAINS:   Pre Treatment: Pain Screen  Pain Scale 1: Numeric (0 - 10)  Pain Intensity 1: 8  Pain Location 1: Foot  Pain Orientation 1: Right  Pain Intervention(s) 1: Encouraged PCA; Position  Post Treatment: 8 Vital Signs  O2 Device: None (Room air) IV and Wound Vac  O2 Device: None (Room air)     GROSS EVALUATION:  B LE Within Functional Limits Abnormal/ Functional Abnormal/ Non-Functional (see comments) Not Tested Comments:   AROM [x] [] [] [] NT R ankle   PROM [x] [] [] [] NT R ankle   Strength [x] [] [] []    Balance [x] [] [] []    Posture [x] [] [] []    Sensation [x] [] [] []    Coordination [x] [] [] []    Tone [x] [] [] []    Edema [x] [] [] []    Activity Tolerance [x] [] [] []     [] [] [] []      COGNITION/  PERCEPTION: Intact Impaired   (see comments) Comments:   Orientation [x] []    Vision [x] []    Hearing [x] []    Command Following [x] []    Safety Awareness [x] []     [] []      MOBILITY: I Mod I S SBA CGA Min Mod Max Total  NT x2 Comments:   Bed Mobility    Rolling [x] [] [] [] [] [] [] [] [] [] []    Supine to Sit [x] [] [] [] [] [] [] [] [] [] []    Scooting [x] [] [] [] [] [] [] [] [] [] []    Sit to Supine [x] [] [] [] [] [] [] [] [] [] []    Transfers    Sit to Stand [] [x] [] [] [] [] [] [] [] [] []    Bed to Chair [] [x] [] [] [] [] [] [] [] [] []    Stand to Sit [] [x] [] [] [] [] [] [] [] [] []    I=Independent, Mod I=Modified Independent, S=Supervision, SBA=Standby Assistance, CGA=Contact Guard Assistance,   Min=Minimal Assistance, Mod=Moderate Assistance, Max=Maximal Assistance, Total=Total Assistance, NT=Not Tested  GAIT: I Mod I S SBA CGA Min Mod Max Total  NT x2 Comments:   Level of Assistance [] [x] [] [] [] [] [] [] [] [] [] Able to maintain NWB R LE well with mobility   Distance 10    DME Rolling Walker    Gait Quality hoping    Weightbearing Status NWB     I=Independent, Mod I=Modified Independent, S=Supervision, SBA=Standby Assistance, CGA=Contact Guard Assistance,   Min=Minimal Assistance, Mod=Moderate Assistance, Max=Maximal Assistance, Total=Total Assistance, NT=Not Tested    [address] [place] [id number] [facility] Form       How much difficulty does the patient currently have. .. Unable A Lot A Little None   1. Turning over in bed (including adjusting bedclothes, sheets and blankets)? [] 1   [] 2   [] 3   [x] 4   2. Sitting down on and standing up from a chair with arms ( e.g., wheelchair, bedside commode, etc.)   [] 1   [] 2   [] 3   [x] 4   3. Moving from lying on back to sitting on the side of the bed? [] 1   [] 2   [] 3   [x] 4   How much help from another person does the patient currently need. .. Total A Lot A Little None   4. Moving to and from a bed to a chair (including a wheelchair)? [] 1   [] 2   [] 3   [x] 4   5. Need to walk in hospital room? [] 1   [] 2   [] 3   [x] 4   6. Climbing 3-5 steps with a railing? [] 1   [] 2   [x] 3   [] 4   © 2007, Trustees of [address] [place] [id number], under license to [employer]. All rights reserved     Score:  Initial: 23 Most Recent: X (Date: -- )    Interpretation of Tool:  Represents activities that are increasingly more difficult (i.e. Bed mobility, Transfers, Gait).     PLAN:   FREQUENCY/DURATION: PT Plan of Care:  (eval and d/c) for duration of hospital stay or until stated goals are met, whichever comes first.    PROBLEM LIST:   (Skilled intervention is medically necessary to address:)  1. Decreased ADL/Functional Activities  2. Decreased Activity Tolerance  3. Decreased Balance  4. Decreased Gait Ability   INTERVENTIONS PLANNED:   (Benefits and precautions of physical therapy have been discussed with the patient.)  1. Therapeutic Activity  2. Therapeutic Exercise/HEP  3. Neuromuscular Re-education  4. Gait Training  5. Manual Therapy  6. Education     TREATMENT:     EVALUATION: Low Complexity : (Untimed Charge)    TREATMENT:   ($$ Therapeutic Activity: 8-22 mins    )  Therapeutic Activity (10 Minutes): Therapeutic activity included Supine to Sit, Sit to Supine, Transfer Training, Ambulation on level ground, Sitting balance , Standing balance and walker safety, NWB with ambulation to improve functional Mobility, Strength and Activity tolerance.     TREATMENT GRID:  N/A    AFTER TREATMENT POSITION/PRECAUTIONS:  Bed, Needs within reach and RN notified    INTERDISCIPLINARY COLLABORATION:  RN/PCT and PT/PTA    TOTAL TREATMENT DURATION:  PT Patient Time In/Time Out  Time In: 9785  Time Out: Estrella PT

## 2021-08-19 NOTE — WOUND CARE
Patient seen, she had her foot VAC dressing changed yesterday. Switched to acute care machine and will follow up tomorrow for routine dressing change.

## 2021-08-20 PROCEDURE — 74011250636 HC RX REV CODE- 250/636: Performed by: ORTHOPAEDIC SURGERY

## 2021-08-20 PROCEDURE — 97605 NEG PRS WND THER DME<=50SQCM: CPT

## 2021-08-20 PROCEDURE — 77030019934 HC DRSG VAC ASST KCON -B

## 2021-08-20 PROCEDURE — 74011250636 HC RX REV CODE- 250/636: Performed by: PHYSICIAN ASSISTANT

## 2021-08-20 PROCEDURE — 74011000250 HC RX REV CODE- 250: Performed by: NURSE PRACTITIONER

## 2021-08-20 PROCEDURE — 2W0QX6Z CHANGE PRESSURE DRESSING ON RIGHT LOWER LEG: ICD-10-PCS | Performed by: ORTHOPAEDIC SURGERY

## 2021-08-20 PROCEDURE — 65270000029 HC RM PRIVATE

## 2021-08-20 PROCEDURE — 74011250636 HC RX REV CODE- 250/636: Performed by: NURSE PRACTITIONER

## 2021-08-20 PROCEDURE — 74011250637 HC RX REV CODE- 250/637: Performed by: PHYSICIAN ASSISTANT

## 2021-08-20 RX ORDER — CEFAZOLIN SODIUM/WATER 2 G/20 ML
2 SYRINGE (ML) INTRAVENOUS EVERY 8 HOURS
Status: DISCONTINUED | OUTPATIENT
Start: 2021-08-20 | End: 2021-08-25 | Stop reason: HOSPADM

## 2021-08-20 RX ORDER — HYDROMORPHONE HCL IN WATER/PF 1 MG/ML
SYRINGE (ML) INJECTION
Status: DISCONTINUED | OUTPATIENT
Start: 2021-08-20 | End: 2021-08-24

## 2021-08-20 RX ORDER — HYDROMORPHONE HCL IN WATER/PF 1 MG/ML
SYRINGE (ML) INJECTION
Status: DISCONTINUED | OUTPATIENT
Start: 2021-08-20 | End: 2021-08-20 | Stop reason: DRUGHIGH

## 2021-08-20 RX ADMIN — SODIUM CHLORIDE 100 ML/HR: 900 INJECTION, SOLUTION INTRAVENOUS at 07:10

## 2021-08-20 RX ADMIN — HYDROMORPHONE HYDROCHLORIDE 1 MG: 1 INJECTION, SOLUTION INTRAMUSCULAR; INTRAVENOUS; SUBCUTANEOUS at 19:22

## 2021-08-20 RX ADMIN — HYDROMORPHONE HYDROCHLORIDE 1 MG: 1 INJECTION, SOLUTION INTRAMUSCULAR; INTRAVENOUS; SUBCUTANEOUS at 22:55

## 2021-08-20 RX ADMIN — SENNOSIDES AND DOCUSATE SODIUM 1 TABLET: 8.6; 5 TABLET ORAL at 07:49

## 2021-08-20 RX ADMIN — CEFAZOLIN SODIUM 2 G: 100 INJECTION, POWDER, LYOPHILIZED, FOR SOLUTION INTRAVENOUS at 19:22

## 2021-08-20 RX ADMIN — HYDROMORPHONE HYDROCHLORIDE 1 MG: 1 INJECTION, SOLUTION INTRAMUSCULAR; INTRAVENOUS; SUBCUTANEOUS at 11:34

## 2021-08-20 RX ADMIN — SODIUM CHLORIDE 100 ML/HR: 900 INJECTION, SOLUTION INTRAVENOUS at 16:52

## 2021-08-20 RX ADMIN — Medication 10 ML: at 11:34

## 2021-08-20 RX ADMIN — HYDROMORPHONE HYDROCHLORIDE 0.5 MG: 1 INJECTION, SOLUTION INTRAMUSCULAR; INTRAVENOUS; SUBCUTANEOUS at 07:37

## 2021-08-20 RX ADMIN — Medication 10 ML: at 21:50

## 2021-08-20 RX ADMIN — CEFAZOLIN SODIUM 2 G: 100 INJECTION, POWDER, LYOPHILIZED, FOR SOLUTION INTRAVENOUS at 11:34

## 2021-08-20 RX ADMIN — HYDROMORPHONE HYDROCHLORIDE 1 MG: 1 INJECTION, SOLUTION INTRAMUSCULAR; INTRAVENOUS; SUBCUTANEOUS at 03:10

## 2021-08-20 RX ADMIN — HYDROMORPHONE HYDROCHLORIDE 1 MG: 1 INJECTION, SOLUTION INTRAMUSCULAR; INTRAVENOUS; SUBCUTANEOUS at 15:05

## 2021-08-20 RX ADMIN — Medication 10 ML: at 05:33

## 2021-08-20 RX ADMIN — Medication 10 ML: at 07:37

## 2021-08-20 RX ADMIN — Medication: at 22:52

## 2021-08-20 NOTE — PROGRESS NOTES
Pt stood and pivoted to [facility] and once back in the bed, right foot wound starting bleeding and leaking with small amount of blood on the floor. This RN reinforced dressing with new Tegaderm and call placed to Teddy Morelos, Wound RN.

## 2021-08-20 NOTE — PROGRESS NOTES
Contacted [facility] to speak to liaisonJamshid, about resuming IV ABT needs when pt d/c but Sneha unavailable, message left, and will return call. Contacted [facility] and made aware pt need HH resumed at d/c for RN wound vac and IV ABT. Resume order send and [facility] aware. CM to continue to follow and monitor for any further needs.

## 2021-08-20 NOTE — WOUND CARE
Left foot wound vac dressing changed, wound is pink and granular. Wound vac applied, seal difficult around toes and using drape around all the toes caused maceration and intertrigo between toes, attempted to seal used duoderm. Will plan next change Monday. Ace wrap re-wrapped form toes to knee. Will monitor.

## 2021-08-20 NOTE — PROGRESS NOTES
[facility] FRACTURE PROGRESS NOTE    2021  Admit Date:   2021    Post Op day: * No surgery found *    Subjective:    Dyke Lies Janeen     PT/OT:   Gait:                    Vital Signs:    Patient Vitals for the past 8 hrs:   BP Temp Pulse Resp SpO2   21 1243 129/81 97.5 °F (36.4 °C) 85 15 99 %   21 0732 116/81 98 °F (36.7 °C) 86 15 99 %     Temp (24hrs), Av.9 °F (36.6 °C), Min:97.5 °F (36.4 °C), Max:98.5 °F (36.9 °C)      Pain Control:   Pain Assessment  Pain Scale 1: Numeric (0 - 10)  Pain Intensity 1: 9  Pain Onset 1: post op  Pain Location 1: Foot  Pain Orientation 1: Right  Pain Description 1: Sharp, Aching  Pain Intervention(s) 1: Medication (see MAR)    Meds:    Current Facility-Administered Medications   Medication Dose Route Frequency    ceFAZolin (ANCEF) 2 g/20 mL in sterile water IV syringe  2 g IntraVENous Q8H    HYDROmorphone (DILAUDID) 30 mg / 30 mL PCA in water   IntraVENous TITRATE    acetaminophen (TYLENOL) tablet 325 mg  325 mg Oral Q4H PRN    0.9% sodium chloride infusion  100 mL/hr IntraVENous CONTINUOUS    sodium chloride (NS) flush 5-40 mL  5-40 mL IntraVENous Q8H    sodium chloride (NS) flush 5-40 mL  5-40 mL IntraVENous PRN    HYDROmorphone (DILAUDID) injection 0.5-1 mg  0.5-1 mg IntraVENous Q3H PRN    naloxone (NARCAN) injection 0.4 mg  0.4 mg IntraVENous Q10MIN PRN    ondansetron (ZOFRAN) injection 4 mg  4 mg IntraVENous Q4H PRN    diphenhydrAMINE (BENADRYL) capsule 25 mg  25 mg Oral Q4H PRN    senna-docusate (PERICOLACE) 8.6-50 mg per tablet 1 Tablet  1 Tablet Oral BID PRN       LAB:    No results for input(s): HCT, HCTEXT, HGB, HGBEXT, INR, INREXT, HCTEXT, HGBEXT, INREXT in the last 72 hours.     24 Hour Assessment Issues:    Oriented    Discharge Planning: HOME    Transfuse PRBC's:      Assessment & Physician's Comment:  Neurovascular checks within normal limits    Principal Problem:    Right foot pain (2021)        Plan:  She is still requiring breakthrough IV medication at this point. We will plan to up her basal rate. The strategy of this is to find the appropriate amount of medications she will need for outpatient pain management doctor to prescribe. Her VAC was changed today.     Jonh Nagy MD

## 2021-08-20 NOTE — WOUND CARE
Pt seen for wound vac dressing change to right lateral foot wound. Removed saturated dressing, per pt when she got up she noticed the dressing started leaking with blood, noted mostly clotted blood under dressing. Dr. Adrianna Smith in to see patient during dressing change, informed of clot as well. No further concerns noted. Wound is healing, sutures still intact. Applied 2 layers of skin prep to devonte wound with some maceration. Incisional vac placed over sutures and vac dressing placed into deeper wound measuring 0.3 x 0.4 X 0.2 cm with pale pink wound bed, small amount of antibiotic beads present as well. Attached new wound vac dressing to vac machine, seal obtained. Pt tolerated well, is on PCA pain pump at this time. Wound team plans for MWF dressing changes while in acute care setting. Home vac in room from last discharge.

## 2021-08-20 NOTE — PROGRESS NOTES
Bedside report received from PAULIE Simmons  Dilaudid PCA infusing and rate verify completed. Pt requesting PTA meds including effexor, hydroxyzine. Pt also stated she was to be on ancef which is not currently ordered. Messaged on call person Allan Abbasi NP who stated for this RN to call the office when they open. Pt states pain 8/10 right foot. Medicated with prn dilaudid 0.5mg per MAR orders at [phone]. At [phone] Pt is crying stating it is not working yet and needs something different. 0830 Pt reported pain much better.

## 2021-08-21 PROCEDURE — 74011000250 HC RX REV CODE- 250: Performed by: NURSE PRACTITIONER

## 2021-08-21 PROCEDURE — 74011250636 HC RX REV CODE- 250/636: Performed by: ORTHOPAEDIC SURGERY

## 2021-08-21 PROCEDURE — 74011250636 HC RX REV CODE- 250/636: Performed by: NURSE PRACTITIONER

## 2021-08-21 PROCEDURE — 74011250636 HC RX REV CODE- 250/636: Performed by: PHYSICIAN ASSISTANT

## 2021-08-21 PROCEDURE — 74011250637 HC RX REV CODE- 250/637: Performed by: PHYSICIAN ASSISTANT

## 2021-08-21 PROCEDURE — 65270000029 HC RM PRIVATE

## 2021-08-21 RX ORDER — HYDROXYZINE 25 MG/1
25 TABLET, FILM COATED ORAL DAILY
Status: DISCONTINUED | OUTPATIENT
Start: 2021-08-21 | End: 2021-08-25 | Stop reason: HOSPADM

## 2021-08-21 RX ADMIN — HYDROXYZINE HYDROCHLORIDE 25 MG: 25 TABLET, FILM COATED ORAL at 10:41

## 2021-08-21 RX ADMIN — Medication 10 ML: at 05:06

## 2021-08-21 RX ADMIN — HYDROMORPHONE HYDROCHLORIDE 1 MG: 1 INJECTION, SOLUTION INTRAMUSCULAR; INTRAVENOUS; SUBCUTANEOUS at 08:26

## 2021-08-21 RX ADMIN — VENLAFAXINE HYDROCHLORIDE 225 MG: 150 CAPSULE, EXTENDED RELEASE ORAL at 10:48

## 2021-08-21 RX ADMIN — ONDANSETRON 4 MG: 2 INJECTION INTRAMUSCULAR; INTRAVENOUS at 09:35

## 2021-08-21 RX ADMIN — SODIUM CHLORIDE 100 ML/HR: 900 INJECTION, SOLUTION INTRAVENOUS at 22:33

## 2021-08-21 RX ADMIN — HYDROMORPHONE HYDROCHLORIDE 1 MG: 1 INJECTION, SOLUTION INTRAMUSCULAR; INTRAVENOUS; SUBCUTANEOUS at 05:04

## 2021-08-21 RX ADMIN — CEFAZOLIN SODIUM 2 G: 100 INJECTION, POWDER, LYOPHILIZED, FOR SOLUTION INTRAVENOUS at 02:06

## 2021-08-21 RX ADMIN — HYDROMORPHONE HYDROCHLORIDE 1 MG: 1 INJECTION, SOLUTION INTRAMUSCULAR; INTRAVENOUS; SUBCUTANEOUS at 02:06

## 2021-08-21 RX ADMIN — Medication 10 ML: at 19:43

## 2021-08-21 RX ADMIN — ONDANSETRON 4 MG: 2 INJECTION INTRAMUSCULAR; INTRAVENOUS at 17:19

## 2021-08-21 RX ADMIN — CEFAZOLIN SODIUM 2 G: 100 INJECTION, POWDER, LYOPHILIZED, FOR SOLUTION INTRAVENOUS at 11:40

## 2021-08-21 RX ADMIN — SODIUM CHLORIDE 100 ML/HR: 900 INJECTION, SOLUTION INTRAVENOUS at 02:06

## 2021-08-21 RX ADMIN — SODIUM CHLORIDE 100 ML/HR: 900 INJECTION, SOLUTION INTRAVENOUS at 11:44

## 2021-08-21 RX ADMIN — HYDROMORPHONE HYDROCHLORIDE 0.5 MG: 1 INJECTION, SOLUTION INTRAMUSCULAR; INTRAVENOUS; SUBCUTANEOUS at 22:33

## 2021-08-21 RX ADMIN — Medication 10 ML: at 14:25

## 2021-08-21 RX ADMIN — HYDROMORPHONE HYDROCHLORIDE 1 MG: 1 INJECTION, SOLUTION INTRAMUSCULAR; INTRAVENOUS; SUBCUTANEOUS at 17:18

## 2021-08-21 RX ADMIN — CEFAZOLIN SODIUM 2 G: 100 INJECTION, POWDER, LYOPHILIZED, FOR SOLUTION INTRAVENOUS at 19:42

## 2021-08-21 RX ADMIN — HYDROMORPHONE HYDROCHLORIDE 1 MG: 1 INJECTION, SOLUTION INTRAMUSCULAR; INTRAVENOUS; SUBCUTANEOUS at 11:40

## 2021-08-21 RX ADMIN — Medication: at 10:37

## 2021-08-21 NOTE — PROGRESS NOTES
Hourly rounds completed throughout this shift. Pain and nausea meds given per MAR. No issues with PCA pump. Call light and personal items within pt's reach. Bed low and locked. Pt resting in bed; denies needs at this time. Will continue to monitor and report to oncoming night shift nurse.

## 2021-08-21 NOTE — PROGRESS NOTES
2021         Post Op day: * No surgery found *   Admit Diagnosis: Right foot pain [M79.671]  LAB:    No results found for this or any previous visit (from the past 24 hour(s)). Vital Signs:    Patient Vitals for the past 8 hrs:   BP Temp Pulse Resp SpO2   21 1125 112/72 97.5 °F (36.4 °C) 69 16 98 %   21 0750 104/65 97.6 °F (36.4 °C) 86 18 100 %     Temp (24hrs), Av.7 °F (36.5 °C), Min:97.5 °F (36.4 °C), Max:97.9 °F (36.6 °C)    Pain Control:   Pain Assessment  Pain Scale 1: Numeric (0 - 10)  Pain Intensity 1: 8  Pain Onset 1: post op  Pain Location 1: Foot  Pain Orientation 1: Right  Pain Description 1: Aching, Throbbing  Pain Intervention(s) 1: Medication (see MAR)  Subjective: Doing well, resting comfortably in bed. no complaints. Objective:  No Acute Distress, Alert and Oriented, vac in place with good seal.  Minimal output. Neurovascular exam is normal       Assessment:   Patient Active Problem List   Diagnosis Code    Parent refuses immunizations Z28.82    Scoliosis (managed by [facility] [place] [facility]) M41.9    S/P foot surgery, left (surgeries for \"flat feet\" - under care of POA) Z98.890    Cholecystitis K81.9    Cellulitis of left jaw L03. 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 Continue wound vac for now.    Signed By: CAMERON Puente

## 2021-08-21 NOTE — PROGRESS NOTES
Hourly rounding completed on this shift. All needs met. PRN pain meds given per [facility] and PCA pain pump continued. Pt is currently resting in bed. Will continue to monitor and give report to oncoming nurse.

## 2021-08-22 LAB
BACTERIA SPEC CULT: NORMAL
SERVICE CMNT-IMP: NORMAL

## 2021-08-22 PROCEDURE — 74011250636 HC RX REV CODE- 250/636: Performed by: ORTHOPAEDIC SURGERY

## 2021-08-22 PROCEDURE — 65270000029 HC RM PRIVATE

## 2021-08-22 PROCEDURE — 74011000250 HC RX REV CODE- 250: Performed by: NURSE PRACTITIONER

## 2021-08-22 PROCEDURE — 74011250637 HC RX REV CODE- 250/637: Performed by: PHYSICIAN ASSISTANT

## 2021-08-22 PROCEDURE — 74011250636 HC RX REV CODE- 250/636: Performed by: NURSE PRACTITIONER

## 2021-08-22 PROCEDURE — 74011250636 HC RX REV CODE- 250/636: Performed by: PHYSICIAN ASSISTANT

## 2021-08-22 RX ADMIN — HYDROMORPHONE HYDROCHLORIDE 1 MG: 1 INJECTION, SOLUTION INTRAMUSCULAR; INTRAVENOUS; SUBCUTANEOUS at 02:48

## 2021-08-22 RX ADMIN — HYDROMORPHONE HYDROCHLORIDE 1 MG: 1 INJECTION, SOLUTION INTRAMUSCULAR; INTRAVENOUS; SUBCUTANEOUS at 10:41

## 2021-08-22 RX ADMIN — CEFAZOLIN SODIUM 2 G: 100 INJECTION, POWDER, LYOPHILIZED, FOR SOLUTION INTRAVENOUS at 02:48

## 2021-08-22 RX ADMIN — HYDROMORPHONE HYDROCHLORIDE 1 MG: 1 INJECTION, SOLUTION INTRAMUSCULAR; INTRAVENOUS; SUBCUTANEOUS at 16:51

## 2021-08-22 RX ADMIN — SODIUM CHLORIDE 100 ML/HR: 900 INJECTION, SOLUTION INTRAVENOUS at 18:48

## 2021-08-22 RX ADMIN — Medication 10 ML: at 15:11

## 2021-08-22 RX ADMIN — HYDROMORPHONE HYDROCHLORIDE 1 MG: 1 INJECTION, SOLUTION INTRAMUSCULAR; INTRAVENOUS; SUBCUTANEOUS at 22:47

## 2021-08-22 RX ADMIN — ONDANSETRON 4 MG: 2 INJECTION INTRAMUSCULAR; INTRAVENOUS at 07:25

## 2021-08-22 RX ADMIN — HYDROMORPHONE HYDROCHLORIDE 1 MG: 1 INJECTION, SOLUTION INTRAMUSCULAR; INTRAVENOUS; SUBCUTANEOUS at 07:25

## 2021-08-22 RX ADMIN — CEFAZOLIN SODIUM 2 G: 100 INJECTION, POWDER, LYOPHILIZED, FOR SOLUTION INTRAVENOUS at 10:42

## 2021-08-22 RX ADMIN — Medication 20 ML: at 02:49

## 2021-08-22 RX ADMIN — HYDROMORPHONE HYDROCHLORIDE 1 MG: 1 INJECTION, SOLUTION INTRAMUSCULAR; INTRAVENOUS; SUBCUTANEOUS at 19:40

## 2021-08-22 RX ADMIN — Medication 10 ML: at 07:25

## 2021-08-22 RX ADMIN — HYDROMORPHONE HYDROCHLORIDE 1 MG: 1 INJECTION, SOLUTION INTRAMUSCULAR; INTRAVENOUS; SUBCUTANEOUS at 13:39

## 2021-08-22 RX ADMIN — HYDROXYZINE HYDROCHLORIDE 25 MG: 25 TABLET, FILM COATED ORAL at 10:02

## 2021-08-22 RX ADMIN — VENLAFAXINE HYDROCHLORIDE 225 MG: 150 CAPSULE, EXTENDED RELEASE ORAL at 07:19

## 2021-08-22 RX ADMIN — Medication 10 ML: at 19:39

## 2021-08-22 RX ADMIN — CEFAZOLIN SODIUM 2 G: 100 INJECTION, POWDER, LYOPHILIZED, FOR SOLUTION INTRAVENOUS at 18:48

## 2021-08-22 RX ADMIN — SODIUM CHLORIDE 100 ML/HR: 900 INJECTION, SOLUTION INTRAVENOUS at 10:02

## 2021-08-22 RX ADMIN — Medication: at 15:34

## 2021-08-22 NOTE — PROGRESS NOTES
2021              Admit Date: 2021  Admit Diagnosis: Right foot pain [M79.671]       Principle Problem: Right foot pain. Subjective:  Patient reports that the IV dilaudid makes her fall asleep and then when it wears off she wakes up in pain. She reports seeing pain mgmt who has tried to manage her pain and tried oxycodone, hydrocodone, demerol and oral dilaudid. No SOB, No Chest Pain, No Nausea or Vomiting     Objective:   Vital Signs are Stable, No Acute Distress, Alert,  Wound vac in place and right foot dressing is dry,  Neurovascular exam is normal.     Assessment / Plan :  Patient Active Problem List   Diagnosis Code    Parent refuses immunizations Z28.82    Scoliosis (managed by [facility] [place] [facility]) M41.9    S/P foot surgery, left (surgeries for \"flat feet\" - under care of POA) Z98.890    Cholecystitis K81.9    Cellulitis of left jaw L03. 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     Lab Results   Component Value Date/Time    HGB 11.3 (L) 2021 10:55 PM          S/P: I &D right foot with removal of hardware and placement of antibiotic beads and wound vac  by Dr. Nighat Lundberg     Pain Control: I recommend consideration of nucynta for oral pain medication but will defer to Dr. Maria Elena Figueroa: changes M,W,F  Continue cefazolin   Continue Dilaudid PCA   Fall Precautions  DC disp: home with [facility] likely Monday Signed By: CAMERON Dorantes  8/22/2021,  10:07 AM

## 2021-08-22 NOTE — PROGRESS NOTES
Problem: Falls - Risk of  Goal: *Absence of Falls  Description: Document Wyatt Reason Fall Risk and appropriate interventions in the flowsheet.   Outcome: Progressing Towards Goal  Note: Fall Risk Interventions:  Mobility Interventions: Communicate number of staff needed for ambulation/transfer, Patient to call before getting OOB, Utilize walker, cane, or other assistive device         Medication Interventions: Evaluate medications/consider consulting pharmacy, Patient to call before getting OOB, Teach patient to arise slowly    Elimination Interventions: Call light in reach, Patient to call for help with toileting needs, Toilet paper/wipes in reach

## 2021-08-23 PROCEDURE — 74011250636 HC RX REV CODE- 250/636: Performed by: PHYSICIAN ASSISTANT

## 2021-08-23 PROCEDURE — 74011250636 HC RX REV CODE- 250/636: Performed by: NURSE PRACTITIONER

## 2021-08-23 PROCEDURE — 74011000250 HC RX REV CODE- 250: Performed by: NURSE PRACTITIONER

## 2021-08-23 PROCEDURE — 74011250637 HC RX REV CODE- 250/637: Performed by: NURSE PRACTITIONER

## 2021-08-23 PROCEDURE — 97605 NEG PRS WND THER DME<=50SQCM: CPT

## 2021-08-23 PROCEDURE — 74011250636 HC RX REV CODE- 250/636: Performed by: ORTHOPAEDIC SURGERY

## 2021-08-23 PROCEDURE — 65270000029 HC RM PRIVATE

## 2021-08-23 PROCEDURE — 74011250637 HC RX REV CODE- 250/637: Performed by: PHYSICIAN ASSISTANT

## 2021-08-23 PROCEDURE — 77030019934 HC DRSG VAC ASST KCON -B

## 2021-08-23 RX ORDER — TRAZODONE HYDROCHLORIDE 50 MG/1
50 TABLET ORAL
Status: DISCONTINUED | OUTPATIENT
Start: 2021-08-23 | End: 2021-08-25 | Stop reason: HOSPADM

## 2021-08-23 RX ADMIN — Medication 10 ML: at 02:11

## 2021-08-23 RX ADMIN — HYDROMORPHONE HYDROCHLORIDE 1 MG: 1 INJECTION, SOLUTION INTRAMUSCULAR; INTRAVENOUS; SUBCUTANEOUS at 23:46

## 2021-08-23 RX ADMIN — CEFAZOLIN SODIUM 2 G: 100 INJECTION, POWDER, LYOPHILIZED, FOR SOLUTION INTRAVENOUS at 12:20

## 2021-08-23 RX ADMIN — HYDROMORPHONE HYDROCHLORIDE 1 MG: 1 INJECTION, SOLUTION INTRAMUSCULAR; INTRAVENOUS; SUBCUTANEOUS at 09:16

## 2021-08-23 RX ADMIN — CEFAZOLIN SODIUM 2 G: 100 INJECTION, POWDER, LYOPHILIZED, FOR SOLUTION INTRAVENOUS at 04:11

## 2021-08-23 RX ADMIN — VENLAFAXINE HYDROCHLORIDE 225 MG: 150 CAPSULE, EXTENDED RELEASE ORAL at 06:06

## 2021-08-23 RX ADMIN — Medication: at 19:43

## 2021-08-23 RX ADMIN — HYDROXYZINE HYDROCHLORIDE 25 MG: 25 TABLET, FILM COATED ORAL at 09:16

## 2021-08-23 RX ADMIN — SODIUM CHLORIDE 100 ML/HR: 900 INJECTION, SOLUTION INTRAVENOUS at 12:23

## 2021-08-23 RX ADMIN — Medication 10 ML: at 15:33

## 2021-08-23 RX ADMIN — HYDROMORPHONE HYDROCHLORIDE 1 MG: 1 INJECTION, SOLUTION INTRAMUSCULAR; INTRAVENOUS; SUBCUTANEOUS at 15:52

## 2021-08-23 RX ADMIN — HYDROMORPHONE HYDROCHLORIDE 1 MG: 1 INJECTION, SOLUTION INTRAMUSCULAR; INTRAVENOUS; SUBCUTANEOUS at 06:05

## 2021-08-23 RX ADMIN — TRAZODONE HYDROCHLORIDE 50 MG: 50 TABLET ORAL at 20:44

## 2021-08-23 RX ADMIN — HYDROMORPHONE HYDROCHLORIDE 1 MG: 1 INJECTION, SOLUTION INTRAMUSCULAR; INTRAVENOUS; SUBCUTANEOUS at 18:54

## 2021-08-23 RX ADMIN — SODIUM CHLORIDE 100 ML/HR: 900 INJECTION, SOLUTION INTRAVENOUS at 04:10

## 2021-08-23 RX ADMIN — CEFAZOLIN SODIUM 2 G: 100 INJECTION, POWDER, LYOPHILIZED, FOR SOLUTION INTRAVENOUS at 20:44

## 2021-08-23 RX ADMIN — HYDROMORPHONE HYDROCHLORIDE 1 MG: 1 INJECTION, SOLUTION INTRAMUSCULAR; INTRAVENOUS; SUBCUTANEOUS at 02:11

## 2021-08-23 RX ADMIN — ONDANSETRON 4 MG: 2 INJECTION INTRAMUSCULAR; INTRAVENOUS at 06:06

## 2021-08-23 RX ADMIN — Medication: at 17:09

## 2021-08-23 RX ADMIN — HYDROMORPHONE HYDROCHLORIDE 1 MG: 1 INJECTION, SOLUTION INTRAMUSCULAR; INTRAVENOUS; SUBCUTANEOUS at 20:43

## 2021-08-23 RX ADMIN — HYDROMORPHONE HYDROCHLORIDE 1 MG: 1 INJECTION, SOLUTION INTRAMUSCULAR; INTRAVENOUS; SUBCUTANEOUS at 12:20

## 2021-08-23 NOTE — WOUND CARE
Patient seen for routine VAC dressing change to right lateral foot/heel area. Sutures remain intact. Incision line 3.5cm long. Open area to proximal end of incision 0.6c0. 6x0.5 cm. Base pink. Skin prep and drape added to protect. Patient tolerated procedure well. Small blister distal to incision line, covered with dressing to protect and prevent undermining VAC dressing. Will plan on follow up Wednesday if she remains in acute care. Updated patient, answered all of her questions.

## 2021-08-23 NOTE — PROGRESS NOTES
Physician Progress Note      PATIENT:               Ozzy Perez  CSN #:                  [id number]  :                       2000  ADMIT DATE:       2021 8:26 AM  DISCH DATE:        2021 3:12 PM  RESPONDING  PROVIDER #:        Lincoln Barnes MD          QUERY TEXT:    Dr. Sims Paget,  The documentation listed for the clinical indicators was from your office note and according to the H&P on the chart it states \"See the office notes for the full long term history of the problem\". Pt admitted with cellulitis of the right lower extremity. If possible, please document in progress notes and discharge summary:    The medical record reflects the following:  Risk Factors: s/p surgery to right lower extremity  Clinical Indicators: \"Examination the wound shows one of the Prolene sutures is broken. she does have some serous drainage  from the wound. I cannot express any pus or sign of an abscess deeper. \"  ? Treatment: debridement and removal of hardware on 21  Options provided:  -- Cellulitis of the right lower exremity is a complication of previous post-op deep surgical site infection. -- Cellulitis of the right lower extremity is a complication of post-op infection due to internal hardware  -- Cellulitis due to both complication of deep surgical site infection as well as a complication of hardware. -- Other - I will add my own diagnosis  -- Disagree - Not applicable / Not valid  -- Disagree - Clinically unable to determine / Unknown  -- Refer to Clinical Documentation Reviewer    PROVIDER RESPONSE TEXT:    [[This patient has cellulitis due to both complication of deep surgical site infection as well as a complication of hardware.     Query created by: Arnoldo Whyte on 2021 11:09 AM      Electronically signed by:  Lincoln Barnes MD 2021 12:48 PM

## 2021-08-23 NOTE — PROGRESS NOTES
Problem: Falls - Risk of  Goal: *Absence of Falls  Description: Document Sandip Lakshmi Fall Risk and appropriate interventions in the flowsheet. Outcome: Progressing Towards Goal  Note: Fall Risk Interventions:  Mobility Interventions: Bed/chair exit alarm, OT consult for ADLs, Patient to call before getting OOB         Medication Interventions: Assess postural VS orthostatic hypotension, Patient to call before getting OOB, Teach patient to arise slowly    Elimination Interventions: Bed/chair exit alarm, Call light in reach              Problem: Patient Education: Go to Patient Education Activity  Goal: Patient/Family Education  Outcome: Progressing Towards Goal     Problem: Patient Education: Go to Patient Education Activity  Goal: Patient/Family Education  Outcome: Progressing Towards Goal     Problem: Patient Education: Go to Patient Education Activity  Goal: Patient/Family Education  Outcome: Progressing Towards Goal     Problem: General Wound Care  Goal: *Non-infected wound: Improvement of existing wound, absence of infection, and maintenance of skin integrity  Outcome: Progressing Towards Goal  Goal: *Infected Wound: Prevention of further infection and promotion of healing  Description: Infection control procedures (eg: clean dressings, clean gloves, hand washing, precautions to isolate wound from contamination, sterile instruments used for wound debridement) should be implemented.   Outcome: Progressing Towards Goal  Goal: Interventions  Outcome: Progressing Towards Goal     Problem: Patient Education: Go to Patient Education Activity  Goal: Patient/Family Education  Outcome: Progressing Towards Goal

## 2021-08-23 NOTE — PROGRESS NOTES
CM chart review. Patient was current with [facility] for wound vac dressing changes and [facility] for IV abx. CM sent referral to [facility] in [facility] and spoke with Annamarie Kim who states they will continue to see patient at discharge; patient was receiving Cefazolin 2gms every 8 hours prior to admission; they will need resumption of care orders to be sent if patient is to continue on this regimen. CM will follow to assist with discharge when patient medically stable.

## 2021-08-23 NOTE — PROGRESS NOTES
ORTH FRACTURE PROGRESS NOTE    2021  Admit Date:   2021    Post Op day: * No surgery found *    Subjective:    Hulen Rudi Batistat     PT/OT:   Gait:                    Vital Signs:    Patient Vitals for the past 8 hrs:   BP Temp Pulse Resp SpO2   21 1505 106/69 97.9 °F (36.6 °C) 84 14 97 %     Temp (24hrs), Av.9 °F (36.6 °C), Min:97.4 °F (36.3 °C), Max:98.1 °F (36.7 °C)      Pain Control:   Pain Assessment  Pain Scale 1: Visual  Pain Intensity 1: 0  Pain Onset 1: postop  Pain Location 1: Foot  Pain Orientation 1: Right  Pain Description 1: Sharp  Pain Intervention(s) 1: Medication (see MAR)    Meds:    Current Facility-Administered Medications   Medication Dose Route Frequency    traZODone (DESYREL) tablet 50 mg  50 mg Oral QHS    hydrOXYzine HCL (ATARAX) tablet 25 mg  25 mg Oral DAILY    venlafaxine-SR (EFFEXOR-XR) capsule 225 mg  225 mg Oral 7am    ceFAZolin (ANCEF) 2 g/20 mL in sterile water IV syringe  2 g IntraVENous Q8H    HYDROmorphone (DILAUDID) 30 mg / 30 mL PCA in water   IntraVENous TITRATE    acetaminophen (TYLENOL) tablet 325 mg  325 mg Oral Q4H PRN    0.9% sodium chloride infusion  100 mL/hr IntraVENous CONTINUOUS    sodium chloride (NS) flush 5-40 mL  5-40 mL IntraVENous Q8H    sodium chloride (NS) flush 5-40 mL  5-40 mL IntraVENous PRN    HYDROmorphone (DILAUDID) injection 0.5-1 mg  0.5-1 mg IntraVENous Q3H PRN    naloxone (NARCAN) injection 0.4 mg  0.4 mg IntraVENous Q10MIN PRN    ondansetron (ZOFRAN) injection 4 mg  4 mg IntraVENous Q4H PRN    diphenhydrAMINE (BENADRYL) capsule 25 mg  25 mg Oral Q4H PRN    senna-docusate (PERICOLACE) 8.6-50 mg per tablet 1 Tablet  1 Tablet Oral BID PRN       LAB:    No results for input(s): HCT, HCTEXT, HGB, HGBEXT, INR, INREXT, HCTEXT, HGBEXT, INREXT in the last 72 hours.     24 Hour Assessment Issues:    Oriented    Discharge Planning: HOME    Transfuse PRBC's:      Assessment & Physician's Comment:  Neurovascular checks within normal limits    Principal Problem:    Right foot pain (8/18/2021)        Plan: She is using 54 mg of Dilaudid through the IV via PCA and breakthrough today in a 24-hour period for her pain control. I am consulting with her outpatient pain management physician to ensure she has correct follow-up for her pain control outpatient. Hopefully will have this sorted by tomorrow.       Geovany Vogt MD

## 2021-08-24 PROCEDURE — 74011250637 HC RX REV CODE- 250/637: Performed by: ORTHOPAEDIC SURGERY

## 2021-08-24 PROCEDURE — 65270000029 HC RM PRIVATE

## 2021-08-24 PROCEDURE — 74011250637 HC RX REV CODE- 250/637: Performed by: PHYSICIAN ASSISTANT

## 2021-08-24 PROCEDURE — 74011250636 HC RX REV CODE- 250/636: Performed by: NURSE PRACTITIONER

## 2021-08-24 PROCEDURE — 74011000250 HC RX REV CODE- 250: Performed by: NURSE PRACTITIONER

## 2021-08-24 PROCEDURE — 74011250637 HC RX REV CODE- 250/637: Performed by: NURSE PRACTITIONER

## 2021-08-24 PROCEDURE — 74011250636 HC RX REV CODE- 250/636: Performed by: PHYSICIAN ASSISTANT

## 2021-08-24 RX ORDER — HYDROMORPHONE HYDROCHLORIDE 2 MG/1
8 TABLET ORAL
Status: DISCONTINUED | OUTPATIENT
Start: 2021-08-24 | End: 2021-08-25 | Stop reason: HOSPADM

## 2021-08-24 RX ADMIN — CEFAZOLIN SODIUM 2 G: 100 INJECTION, POWDER, LYOPHILIZED, FOR SOLUTION INTRAVENOUS at 11:02

## 2021-08-24 RX ADMIN — SODIUM CHLORIDE 100 ML/HR: 900 INJECTION, SOLUTION INTRAVENOUS at 08:45

## 2021-08-24 RX ADMIN — Medication: at 07:07

## 2021-08-24 RX ADMIN — Medication 10 ML: at 22:15

## 2021-08-24 RX ADMIN — HYDROMORPHONE HYDROCHLORIDE 8 MG: 2 TABLET ORAL at 16:50

## 2021-08-24 RX ADMIN — HYDROXYZINE HYDROCHLORIDE 25 MG: 25 TABLET, FILM COATED ORAL at 08:42

## 2021-08-24 RX ADMIN — HYDROMORPHONE HYDROCHLORIDE 1 MG: 1 INJECTION, SOLUTION INTRAMUSCULAR; INTRAVENOUS; SUBCUTANEOUS at 09:43

## 2021-08-24 RX ADMIN — CEFAZOLIN SODIUM 2 G: 100 INJECTION, POWDER, LYOPHILIZED, FOR SOLUTION INTRAVENOUS at 22:27

## 2021-08-24 RX ADMIN — HYDROMORPHONE HYDROCHLORIDE 1 MG: 1 INJECTION, SOLUTION INTRAMUSCULAR; INTRAVENOUS; SUBCUTANEOUS at 06:31

## 2021-08-24 RX ADMIN — HYDROMORPHONE HYDROCHLORIDE 8 MG: 2 TABLET ORAL at 22:14

## 2021-08-24 RX ADMIN — HYDROMORPHONE HYDROCHLORIDE 1 MG: 1 INJECTION, SOLUTION INTRAMUSCULAR; INTRAVENOUS; SUBCUTANEOUS at 13:30

## 2021-08-24 RX ADMIN — ACETAMINOPHEN 325 MG: 325 TABLET ORAL at 22:27

## 2021-08-24 RX ADMIN — Medication 10 ML: at 15:06

## 2021-08-24 RX ADMIN — CEFAZOLIN SODIUM 2 G: 100 INJECTION, POWDER, LYOPHILIZED, FOR SOLUTION INTRAVENOUS at 03:15

## 2021-08-24 RX ADMIN — VENLAFAXINE HYDROCHLORIDE 225 MG: 150 CAPSULE, EXTENDED RELEASE ORAL at 06:30

## 2021-08-24 RX ADMIN — TRAZODONE HYDROCHLORIDE 50 MG: 50 TABLET ORAL at 22:15

## 2021-08-24 RX ADMIN — HYDROMORPHONE HYDROCHLORIDE 1 MG: 1 INJECTION, SOLUTION INTRAMUSCULAR; INTRAVENOUS; SUBCUTANEOUS at 03:14

## 2021-08-24 NOTE — PROGRESS NOTES
ORTH FRACTURE PROGRESS NOTE    2021  Admit Date:   2021    Post Op day: * No surgery found *    Subjective:    Villatoro Fries Janeen     PT/OT:   Gait:                    Vital Signs:    Patient Vitals for the past 8 hrs:   BP Temp Pulse Resp SpO2   21 1029 92/61 97.1 °F (36.2 °C) 76 15 96 %     Temp (24hrs), Av.6 °F (36.4 °C), Min:97.1 °F (36.2 °C), Max:98.2 °F (36.8 °C)      Pain Control:   Pain Assessment  Pain Scale 1: Numeric (0 - 10)  Pain Intensity 1: 0  Pain Onset 1: postop  Pain Location 1: Foot  Pain Orientation 1: Right  Pain Description 1: Sharp  Pain Intervention(s) 1: Medication (see MAR)    Meds:    Current Facility-Administered Medications   Medication Dose Route Frequency    traZODone (DESYREL) tablet 50 mg  50 mg Oral QHS    hydrOXYzine HCL (ATARAX) tablet 25 mg  25 mg Oral DAILY    venlafaxine-SR (EFFEXOR-XR) capsule 225 mg  225 mg Oral 7am    ceFAZolin (ANCEF) 2 g/20 mL in sterile water IV syringe  2 g IntraVENous Q8H    HYDROmorphone (DILAUDID) 30 mg / 30 mL PCA in water   IntraVENous TITRATE    acetaminophen (TYLENOL) tablet 325 mg  325 mg Oral Q4H PRN    0.9% sodium chloride infusion  100 mL/hr IntraVENous CONTINUOUS    sodium chloride (NS) flush 5-40 mL  5-40 mL IntraVENous Q8H    sodium chloride (NS) flush 5-40 mL  5-40 mL IntraVENous PRN    HYDROmorphone (DILAUDID) injection 0.5-1 mg  0.5-1 mg IntraVENous Q3H PRN    naloxone (NARCAN) injection 0.4 mg  0.4 mg IntraVENous Q10MIN PRN    ondansetron (ZOFRAN) injection 4 mg  4 mg IntraVENous Q4H PRN    diphenhydrAMINE (BENADRYL) capsule 25 mg  25 mg Oral Q4H PRN    senna-docusate (PERICOLACE) 8.6-50 mg per tablet 1 Tablet  1 Tablet Oral BID PRN       LAB:    No results for input(s): HCT, HCTEXT, HGB, HGBEXT, INR, INREXT, HCTEXT, HGBEXT, INREXT in the last 72 hours.     24 Hour Assessment Issues:    Oriented    Discharge Planning: HOME    Transfuse PRBC's:      Assessment & Physician's Comment:  Dressing is clean, dry, and intact    Principal Problem:    Right foot pain (8/18/2021)        Plan:  I have reviewed her required dosage of Dilaudid. I have spoken personally to her pain management physician, Dr. Vickey Prasad, in [place]. He is told me based on her requirements she should require 8 mg of Dilaudid p.o. every 6 hours. I have reflected this to her today. She is asked to try a day on oral medication before being discharged home. We will therefore stop her PCA and transition to the appropriate dose as outlined of oral Dilaudid medication. I have told her that it is her responsibility to find a pain management physician in her network who is willing to write for this medication. Her choices are either to see Dr. Vickey Prasad out of network in [place] or to find another pain management physician that she and her family can find willing to continue to write her for the medication. I have made it very clear that I will not be the one providing this medication long-term at this dose. She is again asked me today about fentanyl patches. I am not exactly sure why. We discussed this at length previously. Her pain management physician in speaking to him has recommended that she not go this route.     Keya Rodrigez MD

## 2021-08-24 NOTE — ADT AUTH CERT NOTES
Musculoskeletal Disease [facility] - Care Day 3 (8/20/2021) by Maurice Hurd       Review Status Review Entered   Completed 8/20/2021 16:43      Criteria Review      Care Day: 3 Care Date: 8/20/2021 Level of Care: Inpatient Floor    Guideline Day 3    Level Of Care    (X) * Activity level acceptable    8/20/2021 16:42:54 EDT by Maurice Hurd      AMBULATE W/ ASSIST   SCD  OOB IN CHAIR    (X) Floor to discharge    8/20/2021 16:42:54 EDT by Maurice Hurd      ORTHOPEDIC BED    ( ) * Complete discharge planning    Clinical Status    (X) * Temperature status acceptable    8/20/2021 16:42:54 EDT by Maurice Hurd      TMAX: 98.5    ( ) * No infection, or status acceptable    8/20/2021 16:42:54 EDT by Maurice Hurd      Pt seen for wound vac dressing change to right lateral foot wound. ANCEF 2G IV Q8 HRS    (X) * C-reactive protein stable, declining, or not indicated    (X) * Respiratory status acceptable    8/20/2021 16:42:54 EDT by Maurice Hurd      RR 18, 99% RA    (X) * Neurologic status acceptable    8/20/2021 16:42:54 EDT by Maurice Hurd      24 Hour Assessment Issues:    Oriented    (X) * Vascular, soft tissue, and wound status acceptable    8/20/2021 16:42:54 EDT by Maurice Hurd      Her VAC was changed today. ( ) * Pain and nausea absent or adequately managed    8/20/2021 16:42:54 EDT by Danette Schmitz She is still requiring breakthrough IV medication at this point. Grazyna Jimenez will plan to up her basal rate.     (X) * Fracture or injury absent or status acceptable    ( ) * No bone and joint infection, or status acceptable    (X) * Rheumatologic and vasculitic status acceptable    ( ) * General Discharge Criteria met    Interventions    (X) * Intake acceptable    8/20/2021 16:42:54 EDT by Maurice Hurd      PLAN:   REGULAR DIET    ( ) * No inpatient interventions needed    8/20/2021 16:42:54 EDT by Maurice Hurd      She is still requiring breakthrough IV medication at this point. Robert Ewing will plan to up her basal rate. SEE FULL MED DETAILS BELOW    * Milestone   Additional Notes   8/20/2021   LOC -IP-ORTHOPEDICS      ATTENDING (ORTHO) PN    24 Hour Assessment Issues:     Oriented    PT/OT:    Gait:      Discharge Planning: HOME   Assessment & Physician's Comment:   Neurovascular checks within normal limits    Principal Problem:     Right foot pain (8/18/2021)    Plan:   She is still requiring breakthrough IV medication at this point. Robert Ewing will plan to up her basal rate.  The strategy of this is to find the appropriate amount of medications she will need for outpatient pain management doctor to prescribe. Paco Shall was changed today. WOUND CARE RN PN: Pt seen for wound vac dressing change to right lateral foot wound. Removed saturated dressing, per pt when she got up she noticed the dressing started leaking with blood, noted mostly clotted blood under dressing. Dr. Afsaneh Garcia in to see patient during dressing change, informed of clot as well. No further concerns noted. Wound is healing, sutures still intact. Applied 2 layers of skin prep to devonte wound with some maceration. Incisional vac placed over sutures and vac dressing placed into deeper wound measuring 0.3 x 0.4 X 0.2 cm with pale pink wound bed, small amount of antibiotic beads present as well. Attached new wound vac dressing to vac machine, seal obtained. Pt tolerated well, is on PCA pain pump at this time. Wound team plans for MWF dressing changes while in acute care setting. Home vac in room from last discharge.       VITALS: 98.5, HR 93, /76, RR 18, 99% RA       MEDS:    NS IV INFUSION @ 100ML/HR    DILAUDID 30MG/30ML PCA    Nurse Loading Dose: 0 mg    Patient Bolus Dose: 0.2 mg    Lockout Interval: 6 Minutes    Basal Rate: 0 mg/hr    One Hour Dose Limit: 2 mg    Route: IntraVENous Frequency: TITRATE    DILAUDID 0.5-1MG IV Q3 HRS PRN X4 DOSES    PERICOLACE 1TAB PO BID PRN X1 DOSE

## 2021-08-24 NOTE — PROGRESS NOTES
Problem: Falls - Risk of  Goal: *Absence of Falls  Description: Document Shiraz De León Fall Risk and appropriate interventions in the flowsheet. Outcome: Progressing Towards Goal  Note: Fall Risk Interventions:  Mobility Interventions: Bed/chair exit alarm, OT consult for ADLs, Patient to call before getting OOB, PT Consult for mobility concerns         Medication Interventions: Assess postural VS orthostatic hypotension, Bed/chair exit alarm, Patient to call before getting OOB, Teach patient to arise slowly    Elimination Interventions: Bed/chair exit alarm, Call light in reach              Problem: Patient Education: Go to Patient Education Activity  Goal: Patient/Family Education  Outcome: Progressing Towards Goal     Problem: Patient Education: Go to Patient Education Activity  Goal: Patient/Family Education  Outcome: Progressing Towards Goal     Problem: Patient Education: Go to Patient Education Activity  Goal: Patient/Family Education  Outcome: Progressing Towards Goal     Problem: General Wound Care  Goal: *Non-infected wound: Improvement of existing wound, absence of infection, and maintenance of skin integrity  Outcome: Progressing Towards Goal  Goal: *Infected Wound: Prevention of further infection and promotion of healing  Description: Infection control procedures (eg: clean dressings, clean gloves, hand washing, precautions to isolate wound from contamination, sterile instruments used for wound debridement) should be implemented.   Outcome: Progressing Towards Goal  Goal: Interventions  Outcome: Progressing Towards Goal     Problem: Patient Education: Go to Patient Education Activity  Goal: Patient/Family Education  Outcome: Progressing Towards Goal

## 2021-08-25 VITALS
DIASTOLIC BLOOD PRESSURE: 71 MMHG | SYSTOLIC BLOOD PRESSURE: 120 MMHG | HEART RATE: 89 BPM | RESPIRATION RATE: 16 BRPM | OXYGEN SATURATION: 96 % | TEMPERATURE: 98.1 F

## 2021-08-25 LAB
Lab: NORMAL
REFERENCE LAB,REFLB: NORMAL
TEST DESCRIPTION:,ATST: NORMAL

## 2021-08-25 PROCEDURE — 97605 NEG PRS WND THER DME<=50SQCM: CPT

## 2021-08-25 PROCEDURE — 77030019934 HC DRSG VAC ASST KCON -B

## 2021-08-25 PROCEDURE — 74011250637 HC RX REV CODE- 250/637: Performed by: ORTHOPAEDIC SURGERY

## 2021-08-25 PROCEDURE — 74011000250 HC RX REV CODE- 250: Performed by: NURSE PRACTITIONER

## 2021-08-25 PROCEDURE — 74011250636 HC RX REV CODE- 250/636: Performed by: PHYSICIAN ASSISTANT

## 2021-08-25 PROCEDURE — 74011250636 HC RX REV CODE- 250/636: Performed by: NURSE PRACTITIONER

## 2021-08-25 PROCEDURE — 74011250637 HC RX REV CODE- 250/637: Performed by: PHYSICIAN ASSISTANT

## 2021-08-25 RX ORDER — HYDROMORPHONE HYDROCHLORIDE 8 MG/1
8 TABLET ORAL
Qty: 21 TABLET | Refills: 0 | Status: SHIPPED | OUTPATIENT
Start: 2021-08-25 | End: 2021-08-28

## 2021-08-25 RX ADMIN — VENLAFAXINE HYDROCHLORIDE 225 MG: 150 CAPSULE, EXTENDED RELEASE ORAL at 06:32

## 2021-08-25 RX ADMIN — ACETAMINOPHEN 325 MG: 325 TABLET ORAL at 04:31

## 2021-08-25 RX ADMIN — Medication 10 ML: at 05:59

## 2021-08-25 RX ADMIN — SODIUM CHLORIDE 100 ML/HR: 900 INJECTION, SOLUTION INTRAVENOUS at 04:30

## 2021-08-25 RX ADMIN — HYDROMORPHONE HYDROCHLORIDE 8 MG: 2 TABLET ORAL at 04:32

## 2021-08-25 RX ADMIN — CEFAZOLIN SODIUM 2 G: 100 INJECTION, POWDER, LYOPHILIZED, FOR SOLUTION INTRAVENOUS at 11:55

## 2021-08-25 RX ADMIN — CEFAZOLIN SODIUM 2 G: 100 INJECTION, POWDER, LYOPHILIZED, FOR SOLUTION INTRAVENOUS at 04:30

## 2021-08-25 RX ADMIN — HYDROXYZINE HYDROCHLORIDE 25 MG: 25 TABLET, FILM COATED ORAL at 08:19

## 2021-08-25 RX ADMIN — Medication 10 ML: at 15:09

## 2021-08-25 RX ADMIN — HYDROMORPHONE HYDROCHLORIDE 8 MG: 2 TABLET ORAL at 11:55

## 2021-08-25 NOTE — PROGRESS NOTES
ORTH FRACTURE PROGRESS NOTE    2021  Admit Date:   2021    Post Op day: * No surgery found *    Subjective:    Darcie Vernon     PT/OT:   Gait:                    Vital Signs:    Patient Vitals for the past 8 hrs:   BP Temp Pulse Resp SpO2   21 0739 124/79 97.4 °F (36.3 °C) 75 16 97 %   21 0355 98/64 97.6 °F (36.4 °C) 76 16 97 %     Temp (24hrs), Av.8 °F (36.6 °C), Min:97.4 °F (36.3 °C), Max:98.1 °F (36.7 °C)      Pain Control:   Pain Assessment  Pain Scale 1: Numeric (0 - 10)  Pain Intensity 1: 0  Pain Onset 1: postop  Pain Location 1: Foot  Pain Orientation 1: Right  Pain Description 1: Sore, Sharp, Shooting  Pain Intervention(s) 1: Medication (see MAR)    Meds:    Current Facility-Administered Medications   Medication Dose Route Frequency    HYDROmorphone (DILAUDID) tablet 8 mg  8 mg Oral Q6H PRN    traZODone (DESYREL) tablet 50 mg  50 mg Oral QHS    hydrOXYzine HCL (ATARAX) tablet 25 mg  25 mg Oral DAILY    venlafaxine-SR (EFFEXOR-XR) capsule 225 mg  225 mg Oral 7am    ceFAZolin (ANCEF) 2 g/20 mL in sterile water IV syringe  2 g IntraVENous Q8H    acetaminophen (TYLENOL) tablet 325 mg  325 mg Oral Q4H PRN    0.9% sodium chloride infusion  100 mL/hr IntraVENous CONTINUOUS    sodium chloride (NS) flush 5-40 mL  5-40 mL IntraVENous Q8H    sodium chloride (NS) flush 5-40 mL  5-40 mL IntraVENous PRN    naloxone (NARCAN) injection 0.4 mg  0.4 mg IntraVENous Q10MIN PRN    ondansetron (ZOFRAN) injection 4 mg  4 mg IntraVENous Q4H PRN    diphenhydrAMINE (BENADRYL) capsule 25 mg  25 mg Oral Q4H PRN    senna-docusate (PERICOLACE) 8.6-50 mg per tablet 1 Tablet  1 Tablet Oral BID PRN       LAB:    No results for input(s): HCT, HCTEXT, HGB, HGBEXT, INR, INREXT, HCTEXT, HGBEXT, INREXT in the last 72 hours.     24 Hour Assessment Issues:    Oriented    Discharge Planning: HOME    Transfuse PRBC's:      Assessment & Physician's Comment:  Neurovascular checks within normal limits    Principal Problem:    Right foot pain (8/18/2021)        Plan:  Home today on oral pain meds    Enid Napoles MD

## 2021-08-25 NOTE — WOUND CARE
Patient seen for routine VAC dressing change to right foot. Patient tolerated well. Patient has new photo on her phone for update, taken during this dressing change. Base clean and pink. Sutured area remains approximated. Patient stated her wound site is \"numb\" but she can feel deeper pain in her foot. Wound small and may be able to stop VAC soon if surgeon decides. Answered all patient questions.

## 2021-08-25 NOTE — PROGRESS NOTES
Care Management Interventions  PCP Verified by CM: No (pt reports [facility] assisting set up Home PCP)  Mode of Transport at Discharge: Other (see comment)  Transition of Care Consult (CM Consult): Discharge Planning, [address]: No  Reason Outside [place]: Patient already serviced by other home care/hospice agency  Discharge Durable Medical Equipment: No  Physical Therapy Consult: Yes  Occupational Therapy Consult: No  Speech Therapy Consult: No  Current Support Network: Relative's Home (lives with parents and siblings )  Confirm Follow Up Transport: Family  The Plan for Transition of Care is Related to the Following Treatment Goals : return to baseline   The Patient and/or Patient Representative was Provided with a Choice of Provider and Agrees with the Discharge Plan?: Yes  Name of the Patient Representative Who was Provided with a Choice of Provider and Agrees with the Discharge Plan: pt   Freedom of Choice List was Provided with Basic Dialogue that Supports the Patient's Individualized Plan of Care/Goals, Treatment Preferences and Shares the Quality Data Associated with the Providers?: Yes  [employer] Information Provided?: Yes  Discharge Location  Discharge Placement: Home with home health ([facility] and [facility] )  CM met with pt at discharge to discuss DCP. CM sent ANA and discharge summary to [facility] for wound care management/wound vac. [facility] was sent an order for continued OP ABX therapy and this CM confirmed with rep Quinn for [facility]. Pt has pain med rx electronically from Dr Adrianna Smith. Pt will be transported by family [place].

## 2021-08-25 NOTE — PROGRESS NOTES
CM met with pt to discuss discharge needs. Pt states she has [facility] wound care nurse and [facility] for OP ABX therapy. CM will send resumption of care for both supportive therapies at discharge. Pt voicing concerns of discharge without having the ability to control her pain at home. CM following for any additional discharge needs identified.

## 2021-08-25 NOTE — DISCHARGE INSTRUCTIONS
Patient Education   Vacuum-Assisted Closure for Wound Healing: Care Instructions  Your Care Instructions  When you have a wound that is hard to close, your doctor may treat it with vacuum-assisted closure (VAC). VAC uses negative pressure (suction) to help bring the edges of your wound together. It also removes fluid and dead tissue from the wound area. A machine is used to do this. A special covering is put over the wound. Then a tube connects the covering to the machine. The machine creates the suction. VAC does not hurt. You may feel a mild pulling on the wound when treatment first starts. How long you need VAC depends on the size and type of wound you have and how well the [facility] works. You will be limited in what you can do while the wound heals. You will use VAC 24 hours a day. Follow-up care is a key part of your treatment and safety. Be sure to make and go to all appointments, and call your doctor if you are having problems. It's also a good idea to know your test results and keep a list of the medicines you take. How can you care for yourself at home? · A home health care worker will come to your home a few times a week to change the bandage and check the machine. You may need it changed more often if there is a lot of drainage. · Your doctor will give you information on what you can and can't do. This depends on where your wound is located. Your activities may be limited during the time you're using vacuum-assisted closure. · You will be able to take sponge baths. Don't shower or take baths unless your doctor says it is okay. · Take pain medicines exactly as directed. ? If the doctor gave you a prescription medicine for pain, take it as prescribed. ? If you are not taking a prescription pain medicine, ask your doctor if you can take an over-the-counter medicine. · If your doctor prescribed antibiotics, take them as directed. Do not stop taking them just because you feel better.  You need to take the full course of antibiotics. When should you call for help? Call 911 anytime you think you may need emergency care. For example, call if:    · You have a lot of bleeding or see a sudden change in the color or texture of the drainage.     · The wound splits open and organs under the skin can be seen (evisceration). Call your doctor now or seek immediate medical care if:    · The wound starts bleeding.     · The bandage comes off. Cover the area with a sterile bandage until you can see your doctor or your home health care worker comes by.     · You have signs of infection, such as:  ? Increased pain, swelling, warmth, or redness around the wound. ? Red streaks leading from the wound. ? Pus draining from the wound. ? A fever. Watch closely for changes in your health, and be sure to contact your doctor if:    · The noise the machine makes changes or gets very loud. This may mean the seal is broken or the machine is not producing enough suction. Where can you learn more? Go to http://www.hughes.com/  Enter J010 in the search box to learn more about \"Vacuum-Assisted Closure for Wound Healing: Care Instructions. \"  Current as of: March 4, 2020               Content Version: 12.8  © 2006-2021 [employer]. Care instructions adapted under license by [employer] (which disclaims liability or warranty for this information). If you have questions about a medical condition or this instruction, always ask your healthcare professional. [place] [address] any warranty or liability for your use of this information. Patient Education   Peripherally Inserted Central Catheter [facility]): Care Instructions  Your Care Instructions     A peripherally inserted central catheter (PICC) is a soft, flexible tube that runs under your skin from a vein in your arm to a large vein near your heart. One end of the catheter stays outside your body.  It is a type of central venous catheter, or central venous line. You may have it for weeks or months. A PICC is used to give you medicine, blood products, nutrients, or fluids. A PICC makes doing these things more comfortable for you because they are put directly into the catheter. So you will not be stuck with a needle every time. A PICC may be used to draw blood for tests only if another vein, such as in the hand or arm, can't be used. The end of the PICC sometimes has two or three openings so that you can get more than one type of fluid or medicine at a time. Your doctor may give you medicine to make you feel relaxed. You may feel a little pain when your doctor numbs your arm. Your doctor will then thread the catheter up a vein in your arm to a larger vein. You will not feel any pain. The doctor may use stitches or other devices to hold the catheter in place where it exits your arm. After the procedure, the site may be sore for a day or two. Follow-up care is a key part of your treatment and safety. Be sure to make and go to all appointments, and call your doctor if you are having problems. It's also a good idea to know your test results and keep a list of the medicines you take. How can you care for yourself at home? · Do not wear jewelry, such as necklaces, that can catch on the catheter. · If the catheter breaks, follow the instructions your doctor gave you. If you have no instructions, clamp or tie off the catheter. Then see a doctor as soon as possible. · To help prevent infection, take a shower instead of a bath. Do not go swimming with the catheter. · Try to keep the area dry. When you shower, cover the area with waterproof material, such as plastic wrap. · Never touch the open end of the catheter if the cap is off. · Never use scissors, knives, pins, or other sharp objects near the catheter or other tubing. · If your catheter has a clamp, keep it clamped when you are not using it.   · Fasten or tape the catheter to your body to prevent pulling or dangling. · Avoid clothing that rubs or pulls on your catheter. · Avoid bending or crimping your catheter. · Always wash your hands before you touch your catheter. · Wear loose clothing over the catheter for the first 10 to 14 days. When getting dressed, be careful not to pull on the catheter. How to change the dressing  Since the PICC is in one of your arms, you won't be able to change the dressing on your own. You'll need someone to help you change the dressing using the same instructions that your doctor or nurse gave you. Your PICC dressing should be changed at least once a week. If the dressing gets loose, wet, or dirty, it must be changed more often to prevent infection. Your doctor may also give you instructions for when to change the dressing. Be sure you have all your supplies ready. These include medical tape, a surgical mask, sterile gloves, and your dressing kit. The names and brands of the items will vary. Your doctor or nurse may give you specific instructions for changing the dressing. Here are basic tips for how to change the dressing. 1. Wash your hands with soap and water. Do this for 15 seconds. Dry them with paper towels. 2. Put on the surgical mask. 3. Loosen and remove your old dressing. Peel it toward the PICC, not away from it. You may need to use an adhesive remover if it doesn't come off easily. 4. Look at the site carefully for redness, swelling, drainage, tenderness, or warmth. If you notice any of these, call your doctor. 5. Wash your hands again. 6. Open your dressing kit, and put on the sterile gloves. 7. Clean the site with the supplies in the dressing kit. 8. Use the dressing that your doctor gave you, and place it over the site. 9. Tape the PICC tubing to your skin. Make sure it doesn't dangle or pull. When should you call for help? Call 911 anytime you think you may need emergency care.  For example, call if:    · You passed out (lost consciousness).     · You have severe trouble breathing.     · You have sudden chest pain and shortness of breath, or you cough up blood.     · You have a fast or uneven pulse. Call your doctor now or seek immediate medical care if:    · You have signs of infection, such as:  ? Increased pain, swelling, warmth, or redness. ? Red streaks leading from the area. ? Pus or blood draining from the area. ? A fever.     · You have swelling in your face, chest, neck, or arm on the side where the catheter is.     · You have signs of a blood clot, such as bulging veins near the catheter.     · Your catheter is leaking, cracked, or clogged.     · You feel resistance when you inject medicine or fluids into your catheter.     · Your catheter is out of place. This may happen after severe coughing or vomiting, or if you pull on the catheter.     · You have chest pain or shortness of breath. Watch closely for changes in your health, and be sure to contact your doctor if:    · You have any concerns about your catheter. Where can you learn more? Go to http://www.gray.com/  Enter L935 in the search box to learn more about \"Peripherally Inserted Central Catheter (PICC): Care Instructions. \"  Current as of: February 26, 2020               Content Version: 12.8  © 2006-2021 [employer]. Care instructions adapted under license by [employer] (which disclaims liability or warranty for this information). If you have questions about a medical condition or this instruction, always ask your healthcare professional. [place] [address] any warranty or liability for your use of this information.

## 2021-08-25 NOTE — PROGRESS NOTES
Patient discharged home with her mom at this time. A&Ox4. No complaints of new acute pain or discomfort, just her chronic right foot pain. Single lumen PICC still in place and patient will be getting IV ABX at home. Safety precautions maintained. Discharged home at this time.

## 2021-08-25 NOTE — DISCHARGE SUMMARY
Discharge Summary      Patient ID:  Travis Love  [id number]  57 y.o.  2000    Allergies: Latex, Chlorhexidine, Betadine [povidone-iodine], and Hydrocodone    Admit date: 8/18/2021    Discharge date and time: No discharge date for patient encounter. Admitting Physician: Francisco Armstrong MD     Discharge Physician: same    * Admission Diagnoses: Right foot pain [M79.671]    * Discharge Diagnoses:   Hospital Problems as of 8/25/2021 Date Reviewed: 8/13/2021        Codes Class Noted - Resolved POA    * (Principal) Right foot pain ICD-10-CM: M79.671  ICD-9-CM: 729.5  8/18/2021 - Present Unknown              Surgeon: Afsaneh Garcia    Preoperative Medical Clearance: n/a    * Procedure: * No surgery found *           Perioperative Antibiotics: Ancef  ___                                                      Postoperative Pain Management:  dilaudid    DVT Prophylaxis:                                   Lovenox                                      Postoperative transfusions:     none Banked PRBCs     Post Op complications: none        * Discharge Condition: good  Wound appears to be healing without any evidence of infection. * Discharged to: Home    * Follow-up Care/Discharge instructions:  - Anticoagulate with: n/a  - Resume pre hospital diet            - Resume home medications per medical continuation form     - Ambulate with walker, appropriate total joint protocol  - Follow up in office as scheduled   This patient was admitted to the hospital for pain control at the request of the family and her pain management physician 1 week ago. She was placed on a Dilaudid PCA to evaluate the level of required narcotic use for her outpatient pain management physician to prescribe an adequate dose to treat her pain. She has been on a PCA for several days now. This is somewhat controlled her pain. She states that she still having significant amount of pain.   I have consulted with her outpatient pain management physician and told them that she was requiring approximately 54 mg of Dilaudid IV for some pain control. He is recommended that she take 8 mg of Dilaudid every 4 hours. She will be sent home with this amount of medication with a short supply for me. I told both she and her family the need to find a pain manager physician whether is Dr. Bekah Gutierrez or someone else to write this for her moving forward. We have again requested a fentanyl patch. I reminded she and her mother that not only would not think that was a good idea that her pain management physician also did not want her to have a fentanyl patch either as part of her treatment. Her wound is healing well and is getting small enough that she will probably not need a wound VAC much longer. She will continue with her outpatient Ancef through her PICC line as well as her home health wound VAC changes. I will see her back in 1 week.       Signed:  Luis Felipe Ordoñez MD  8/25/2021  11:53 AM

## 2021-08-25 NOTE — PROGRESS NOTES
Problem: Falls - Risk of  Goal: *Absence of Falls  Description: Document Arnoldo Beard Fall Risk and appropriate interventions in the flowsheet. Outcome: Resolved/Met  Note: Fall Risk Interventions:  Mobility Interventions: Bed/chair exit alarm, OT consult for ADLs, Patient to call before getting OOB, PT Consult for mobility concerns         Medication Interventions: Bed/chair exit alarm, Patient to call before getting OOB, Teach patient to arise slowly    Elimination Interventions: Call light in reach              Problem: Patient Education: Go to Patient Education Activity  Goal: Patient/Family Education  Outcome: Resolved/Met     Problem: Patient Education: Go to Patient Education Activity  Goal: Patient/Family Education  Outcome: Resolved/Met     Problem: Patient Education: Go to Patient Education Activity  Goal: Patient/Family Education  Outcome: Resolved/Met     Problem: General Wound Care  Goal: *Non-infected wound: Improvement of existing wound, absence of infection, and maintenance of skin integrity  Outcome: Resolved/Met  Goal: *Infected Wound: Prevention of further infection and promotion of healing  Description: Infection control procedures (eg: clean dressings, clean gloves, hand washing, precautions to isolate wound from contamination, sterile instruments used for wound debridement) should be implemented.   Outcome: Resolved/Met  Goal: Interventions  Outcome: Resolved/Met     Problem: Patient Education: Go to Patient Education Activity  Goal: Patient/Family Education  Outcome: Resolved/Met

## 2022-03-18 PROBLEM — K81.9 CHOLECYSTITIS: Status: ACTIVE | Noted: 2018-01-03

## 2022-03-18 PROBLEM — F32.A DEPRESSION: Status: ACTIVE | Noted: 2019-11-19

## 2022-03-19 PROBLEM — L02.619 CELLULITIS AND ABSCESS OF FOOT: Status: ACTIVE | Noted: 2021-08-05

## 2022-03-19 PROBLEM — L03.119 CELLULITIS AND ABSCESS OF FOOT: Status: ACTIVE | Noted: 2021-08-05

## 2022-03-19 PROBLEM — R91.8 RIGHT UPPER LOBE PULMONARY INFILTRATE: Status: ACTIVE | Noted: 2019-11-19

## 2022-03-19 PROBLEM — A41.9 SEPSIS (HCC): Status: ACTIVE | Noted: 2019-11-19

## 2022-03-19 PROBLEM — M19.071 ARTHRITIS OF MIDTARSAL JOINT OF RIGHT FOOT: Status: ACTIVE | Noted: 2021-07-08

## 2022-03-19 PROBLEM — L03.211: Status: ACTIVE | Noted: 2019-11-19

## 2022-03-19 PROBLEM — D50.9 MICROCYTIC ANEMIA: Status: ACTIVE | Noted: 2019-11-19

## 2022-03-19 PROBLEM — M79.671 RIGHT FOOT PAIN: Status: ACTIVE | Noted: 2021-08-18

## 2022-03-20 PROBLEM — N92.0 MENORRHAGIA WITH REGULAR CYCLE: Status: ACTIVE | Noted: 2019-11-19

## 2024-01-26 ENCOUNTER — HOSPITAL ENCOUNTER (EMERGENCY)
Age: 24
Discharge: HOME | End: 2024-01-26

## 2024-01-26 VITALS
RESPIRATION RATE: 18 BRPM | HEIGHT: 67 IN | SYSTOLIC BLOOD PRESSURE: 127 MMHG | BODY MASS INDEX: 21.19 KG/M2 | TEMPERATURE: 98.1 F | DIASTOLIC BLOOD PRESSURE: 98 MMHG | WEIGHT: 135 LBS | HEART RATE: 89 BPM | OXYGEN SATURATION: 97 %

## 2024-01-26 DIAGNOSIS — N93.9 VAGINAL BLEEDING: Primary | ICD-10-CM

## 2024-01-26 LAB
ABO + RH BLD: NORMAL
ANION GAP SERPL CALC-SCNC: 1 MMOL/L (ref 2–11)
BUN SERPL-MCNC: 11 MG/DL (ref 6–23)
CALCIUM SERPL-MCNC: 8.9 MG/DL (ref 8.3–10.4)
CHLORIDE SERPL-SCNC: 108 MMOL/L (ref 103–113)
CO2 SERPL-SCNC: 28 MMOL/L (ref 21–32)
CREAT SERPL-MCNC: 0.73 MG/DL (ref 0.6–1)
ERYTHROCYTE [DISTWIDTH] IN BLOOD BY AUTOMATED COUNT: 13.6 % (ref 11.9–14.6)
GLUCOSE SERPL-MCNC: 101 MG/DL (ref 65–100)
HCG SERPL-ACNC: 3 MIU/ML (ref 0–6)
HCG UR QL: NEGATIVE
HCT VFR BLD AUTO: 38.1 % (ref 35.8–46.3)
HGB BLD-MCNC: 12.3 G/DL (ref 11.7–15.4)
MCH RBC QN AUTO: 29 PG (ref 26.1–32.9)
MCHC RBC AUTO-ENTMCNC: 32.3 G/DL (ref 31.4–35)
MCV RBC AUTO: 89.9 FL (ref 82–102)
NRBC # BLD: 0 K/UL (ref 0–0.2)
PLATELET # BLD AUTO: 205 K/UL (ref 150–450)
PMV BLD AUTO: 10.2 FL (ref 9.4–12.3)
POTASSIUM SERPL-SCNC: 3.9 MMOL/L (ref 3.5–5.1)
RBC # BLD AUTO: 4.24 M/UL (ref 4.05–5.2)
SODIUM SERPL-SCNC: 137 MMOL/L (ref 136–146)
WBC # BLD AUTO: 7.5 K/UL (ref 4.3–11.1)

## 2024-01-26 PROCEDURE — 81025 URINE PREGNANCY TEST: CPT

## 2024-01-26 PROCEDURE — 80048 BASIC METABOLIC PNL TOTAL CA: CPT

## 2024-01-26 PROCEDURE — 84702 CHORIONIC GONADOTROPIN TEST: CPT

## 2024-01-26 PROCEDURE — 99283 EMERGENCY DEPT VISIT LOW MDM: CPT

## 2024-01-26 PROCEDURE — 85027 COMPLETE CBC AUTOMATED: CPT

## 2024-01-26 PROCEDURE — 86901 BLOOD TYPING SEROLOGIC RH(D): CPT

## 2024-01-26 PROCEDURE — 86900 BLOOD TYPING SEROLOGIC ABO: CPT

## 2024-01-26 ASSESSMENT — PAIN DESCRIPTION - LOCATION: LOCATION: PELVIS

## 2024-01-26 ASSESSMENT — PAIN SCALES - GENERAL: PAINLEVEL_OUTOF10: 1

## 2024-01-26 ASSESSMENT — PAIN - FUNCTIONAL ASSESSMENT: PAIN_FUNCTIONAL_ASSESSMENT: 0-10

## 2024-01-26 NOTE — ED NOTES
I have reviewed discharge instructions with the patient.  The patient verbalized understanding.    Patient left ED via Discharge Method: ambulatory to Home with (family/friend).    Opportunity for questions and clarification provided.       Patient given 0 scripts.         To continue your aftercare when you leave the hospital, you may receive an automated call from our care team to check in on how you are doing.  This is a free service and part of our promise to provide the best care and service to meet your aftercare needs.” If you have questions, or wish to unsubscribe from this service please call [phone].  Thank you for Choosing our [facility] Emergency Department.

## 2024-01-26 NOTE — ED TRIAGE NOTES
Pt reports 5 weeks pregnant. LMP 23. . Pt reports started having some lower abdominal cramping and bleeding starting this am. Concerned about threatened  so here for evaluation.

## 2024-01-27 NOTE — ED PROVIDER NOTES
Emergency Department Provider Note       PCP: No primary care provider on file.   Age: 23 y.o.   Sex: female     DISPOSITION Decision To Discharge 2024 03:24:58 PM       ICD-10-CM    1. Vaginal bleeding  N93.9           Medical Decision Making     Complexity of Problems Addressed:  1 or more acute illnesses that pose a threat to life or bodily function.     Data Reviewed and Analyzed:  I independently ordered and reviewed each unique test.  I reviewed external records: provider visit note from outside specialist.       I interpreted the CBC normal BMP unremarkable Rh is O+ urine pregnancy test negative quantitative hCG less than 3.    Discussion of management or test interpretation.  23-year-old female with reported history of positive home pregnancy test but today urine pregnancy test is negative hCG less than 3.  This could have been a false positive.  Patient advised to call her gynecology office to inform of ER visit to expect menses like vaginal bleeding.  She may need evaluation since this is her second miscarriage      Risk of Complications and/or Morbidity of Patient Management:  Shared medical decision making was utilized in creating the patients health plan today.         History       Patient to ER complaining of vaginal bleeding started today.  She states last menses was .  She had at home positive pregnancy test.  She does not have first GYN visit until  with [facility].  She is  she states she had a miscarriage in first trimester in the past.  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

## 2024-07-19 ENCOUNTER — NURSE TRIAGE (OUTPATIENT)
Dept: OTHER | Facility: CLINIC | Age: 24
End: 2024-07-19

## 2024-07-19 NOTE — TELEPHONE ENCOUNTER
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    Subjective: Caller states \"I was  sitting at my desk, I stood up, got really dizzy, shaky, seeing black and I said I was going to pass out and a co-worker caught me.\"     Current Symptoms: denies head injury, +LOC - 20 seconds; mild weakness    Pain Severity: Denies any pain    Temperature: denies any fever or chills    What has been tried: drank juice and ate crackers    LMP:  7/15/2024  Pregnant: No    Recommended disposition: Go to ED Now; patient refused ED disposition. Patient states she hasn't been eating much.    Care advice provided, caller verbalizes understanding; denies any other questions or concerns.    Patient is going to eat lunch, [facility]    Reason for Disposition   Fainted > 15 minutes ago and still feels weak or dizzy    Protocols used: Fainting-ADULT-[place]

## 2024-12-05 NOTE — PERIOP NOTES
Discharge instructions reviewed with patient's boyfriend, jamal at bedside. Verbalized understanding. No questions at this time. None

## 2025-02-02 NOTE — PROGRESS NOTES
Everett Vernon  : [address] [place] [address],[address]  [address] [place] [address].  [phone]   [phone] [id number]        OUTPATIENT PHYSICAL THERAPY:Daily Note 2017        ICD-10: Treatment Diagnosis: Muscle weakness (generalized) (M62.81)Pain in left ankle and joints of left foot (M25.572)  Precautions/Allergies:   Latex; Chlorhexidine; and Betadine [povidone-iodine]   Fall Risk Score: 0 (? 5 = High Risk)  MD Orders: Evaluation and treatment MEDICAL/REFERRING DIAGNOSIS:  Complex regional pain syndrome of left lower extremity [G90.522]   DATE OF ONSET: 2015 left ankle achilles recession, spring ligament repair, calceneal and cuneiform osteotomy, multiple, 2016 multiple hardware removal surgeries, nerve block 2017  REFERRING PHYSICIAN: Karl Price DO  RETURN PHYSICIAN APPOINTMENT: 2017     INITIAL ASSESSMENT:  Ms. Jorge Luis Zamorano presents with decreased strength, decreased range of motion, swelling, trophic changes, and pain. Her symptoms are consistent with chronic pain, s/p left ankle arthroscopic surgery, and complex regional pain syndrome. She has a flat affect and her mother answers her questions for her. She has been referred to counseling but refuses to talk to a counselor. She has reported non compliance with prescribed HEP that the CRPS specialist provided for her. She will benefit from PT services to de-sensitize pain and improve function. PROBLEM LIST (Impacting functional limitations):  1. Decreased Strength  2. Decreased ADL/Functional Activities  3. Decreased Ambulation Ability/Technique  4. Decreased Balance  5. Increased Pain  6. Decreased Flexibility/Joint Mobility INTERVENTIONS PLANNED:  1. Balance Exercise  2. Cold  3. Cryotherapy  4. Electrical Stimulation  5. Gait Training  6. Heat  7. Home Exercise Program (HEP)  8. Manual Therapy  9. Neuromuscular Re-education/Strengthening  10. Range of Motion (ROM)  11.  Therapeutic Activites  12. Therapeutic Exercise/Strengthening   TREATMENT PLAN:  Effective Dates: 7/17/17 TO 10/13/17. Frequency/Duration: 2 times a week for 12 weeks  GOALS: (Goals have been discussed and agreed upon with patient.)  Short-Term Functional Goals: Time Frame: 2 weeks  1. Patient will be independent with HEP without increased  pain. 2.  Patient will be hussain to walk 2 blocks with normal gait pattern without increased pain. 3.  Patient will have improved sensitivity allowing moderate pressure palpation to dorsal foot. Discharge Goals: Time Frame: 12 weeks  1. Patient will have improved LEFS to > 60/80 allowing improved community participation. 2. Patient will have improved mmt to 5-/5 left ankle allowing her to ascend/descend stairs without pain. 3. Patient will have improved left ankle AROM to 15 deg DF allowing normal gait pattern for walking 1 mile without increased pain. Rehabilitation Potential For Stated Goals: Fair                HISTORY:   History of Present Injury/Illness (Reason for Referral):  15 y/o F with c/o of CRPS pain that was diagnosed April 2017. She has had left foot and ankle pain since 8years old due to flat feet. She has history of 6 left foot and 4 right foot surgeries. She recently received a nerve block 4/2017 that did not help. She has been going to [place] since 6/2017 to see a specialist that treats CRPS. She was given a HEP and is given 2 months to make progress and she might be accepted into a CRPS intensive program in [place] that would start in September 2017. She wants to have a dorsal root ganglion nerve implant but she is working on insurance coverage. She was referred to see a counselor to control stress but she is not interested. Past Medical History/Comorbidities:   Ms. Jose Miguel  has a past medical history of Anxiety; Depression; Left foot pain; and PONV (postoperative nausea and vomiting).  She also has no past medical history of Adverse effect of anesthesia; Difficult intubation; Malignant hyperthermia due to anesthesia; Pseudocholinesterase deficiency; or Unspecified adverse effect of anesthesia. Ms. Jelly Saleh  has a past surgical history that includes heent; orthopaedic (Left, age 8); orthopaedic (Right, age 6); orthopaedic (Left, age 15); orthopaedic (Left, age 15); orthopaedic; orthopaedic (Right, 2016); and orthopaedic (06/2016). Social History/Living Environment:    Lives with family, stairs at home that hurt   Prior Level of Function/Work/Activity:    Previous Treatment Approaches:          5/2015 left achilles resection, osteotomy, spring ligament repair, 2016 multiple f/u surgeries to remove hardware and clear infection (no infection found), nerve blocks 4/2017, 6/2017 Eval in [place] at [facility] [address], may be admitted for intensive program if she continues to have pain in 2 months. Current Medications:    Current Outpatient Prescriptions:     acetaminophen (TYLENOL) 325 mg tablet, Take 650 mg by mouth every four (4) hours as needed for Pain., Disp: , Rfl:    Date Last Reviewed:  7/26/2017   EXAMINATION:   Observation/Orthostatic Postural Assessment:          Open incision left foot heel and dorsal surface of foot, normal temperature to palpation, normal color, left 1st nail dry and broken, multiple healed surgrical incision bilateral ankle and dorsal surfaces of feet  Palpation:          Light touch and rubbing with towel non painful, gentle pressure to muscle non painful, moderate pressure to dorsal foot mild increase in pain.   ROM:     Ankle AROM/PROM  DF L 0 deg/10 deg pain  R 15 deg  PF L 30 deg/45 deg pain R 50 deg  IN L 30 deg pain R 30 deg  EV L 10 deg/15 deg R 15 deg      Strength:     mmt  Ankle DF L 3+ pain  R 5-  Ankle PF L 3+ pain R 5-  Ankle EV L 3+ pain R 5-  ANLKE IN L 3+ pain R 5-      Special Tests:          n/a  Neurological Screen:        Sensation: light touch diminished over left medial, lateral, and dorsal incision, all other locations intact B LE light touch  Functional Mobility:         Gait/Ambulation:  Left foot toe in, decreased toe off, flat foot contact,  Balance:          SLB minimal navicular drop B, pain L foot    Body Structures Involved:  1. Joints  2. Muscles Body Functions Affected:  1. Neuromusculoskeletal  2. Movement Related Activities and Participation Affected:  1. Communication  2. Mobility  3. Domestic Life  4. Interpersonal Interactions and Relationships  5. Community, Social and Civic Life   CLINICAL PRESENTATION:   CLINICAL DECISION MAKING:   Outcome Measure: Tool Used: Lower Extremity Functional Scale (LEFS)  Score:  Initial: 41/80 Most Recent: X/80 (Date: -- )   Interpretation of Score: 20 questions each scored on a 5 point scale with 0 representing \"extreme difficulty or unable to perform\" and 4 representing \"no difficulty\". The lower the score, the greater the functional disability. 80/80 represents no disability. Minimal detectable change is 9 points. Medical Necessity:   · Patient is expected to demonstrate progress in strength and range of motion to increase independence with stairs. Reason for Services/Other Comments:  · Patient has been observed to have decreased strength  before, during or after an intervention. TREATMENT:   (In addition to Assessment/Re-Assessment sessions the following treatments were rendered)  THERAPEUTIC EXERCISE: (see grid for minutes):  Exercises per grid below to improve mobility, strength, balance and coordination. Required moderate visual, verbal and manual cues to promote proper body posture and promote proper body mechanics. Progressed resistance, range, repetitions and complexity of movement as indicated. NEUROMUSCULAR RE-EDUCATION: (see grid for minutes):  Exercise/activities per grid below to improve balance, coordination, kinesthetic sense, posture and proprioception.   Required moderate visual, verbal, manual and tactile cues to promote dynamic balance in standing. MANUAL THERAPY: (see grid minutes): Joint mobilization and Soft tissue mobilization was utilized and necessary because of the patient's restricted joint motion, painful spasm, loss of articular motion and restricted motion of soft tissue. MODALITIES: (see grid for minutes): *  Electrical Stimulation Therapy (IFC) was provided with intensity adjusted throughout treatment to patient tolerance. to reduce pain       *  Cold Pack Therapy in order to provide analgesia and reduce inflammation and edema. *  Hot Pack Therapy in order to relieve muscle spasm. Date: 7/17/17 7/19/17  (visit 2) 7/24/17  (visit 3) 7/26/17  (visit 4)    Modalities:                                Therapeutic Exercise: 10 mins 30 mins 30 min 30 min    Bilateral ankle pumps with mirror between ankles bilateral R ankle 3' 3' R ankle 3' R ankle     Gait training   High knees, retro heel to toe 3 laps each 3 laps each, TB side steps RTB 3 laps TB side steps RTB    HR/TR  Seated 30x 30x 30x    Seated wobbleboard   15x cw/ccw/DF/in/ev 20x each 30x each     Towel crunches/EV/IN  1' each  2' each 2' each direction     Standing rockboard  10x/60 sec hold  repeat repeat    Novi    1' 1'     SLB foam   3x30 sec holds B 3x30 sec holds            Gait training    High knees and heel to toe retro 5'    Self TPR PF  Lacrosse ball 1' 1' lacrosse ball 1' lacrosse ball     Proprioceptive Activities:                                Manual Therapy: 5 mins 10 mins 15 mins 10 min     De-sensitization Towel STM Gentle STM PF, STJ and TCJ mobs repeat repeat            Therapeutic Activities:                                        HEP: Provided HEP handout on 7/17/17  Treatment/Session Assessment:  She has some pain with WB and NWB exercise. She is reporting less pain each session. Con't with POC. · Pain/ Symptoms: Initial:   3/10   \"I was sore after cleaning the house a lot.   I feel better overall\" Post Session:  5/10 ·   Compliance with Program/Exercises: Will assess as treatment progresses. · Recommendations/Intent for next treatment session: \"Next visit will focus on advancements to more challenging activities\".   Total Treatment Duration:  PT Patient Time In/Time Out  Time In: 1400  Time Out: [id number] [address],  Heart failure education provided to the patient in detail. Discussed heart failure nutrition therapy, sodium and fluid intake, importance of diet adherence, daily weights monitoring with the patient. Reinforced importance of weight gain parameters and importance of contacting MD’s about weight changes. Pt made aware RD remains available PRN./verbal instruction/written material/patient instructed

## (undated) DEVICE — CROCODILE GRASPER: Brand: SINGLE-SITE; DAVINCI SI

## (undated) DEVICE — BANDAGE,GAUZE,BULKEE II,4.5"X4.1YD,STRL: Brand: MEDLINE

## (undated) DEVICE — REM POLYHESIVE ADULT PATIENT RETURN ELECTRODE: Brand: VALLEYLAB

## (undated) DEVICE — DRSG GZ OIL EMUL CURAD 3X8 --

## (undated) DEVICE — DRSG VAC ASST CLSR GRNUFM MED -- 18X12.5X3.2CM

## (undated) DEVICE — SOLUTION IV 1000ML 0.9% SOD CHL

## (undated) DEVICE — (D)PREP SKN CHLRAPRP APPL 26ML -- CONVERT TO ITEM 371833

## (undated) DEVICE — SEAL CANN F/12MM 8.5-13MM DISP -- DA VINCI

## (undated) DEVICE — CLIP APPLIER, HEM-O-LOK ML: Brand: SINGLE-SITE; DAVINCI SI

## (undated) DEVICE — BNDG,ELSTC,MATRIX,STRL,4"X5YD,LF,HOOK&LP: Brand: MEDLINE

## (undated) DEVICE — DRAPE INSTR ARM ROBOTIC ENDOWRIST DA VINCI S

## (undated) DEVICE — BUTTON SWITCH PENCIL BLADE ELECTRODE, HOLSTER: Brand: EDGE

## (undated) DEVICE — DRAPE,TOP,102X53,STERILE: Brand: MEDLINE

## (undated) DEVICE — SUT ETHLN 3-0 18IN PS1 BLK --

## (undated) DEVICE — PADDING CAST COHESIVE 4 YDX3 IN HND TEARABLE COTTON SPEC 100

## (undated) DEVICE — BANDAGE,ELASTIC,ESMARK,STERILE,4"X9',LF: Brand: MEDLINE

## (undated) DEVICE — PAD,ABDOMINAL,5"X9",ST,LF,25/BX: Brand: MEDLINE INDUSTRIES, INC.

## (undated) DEVICE — SUTURE PROL SZ 2-0 L18IN NONABSORBABLE BLU FS L26MM 3/8 CIR 8685H

## (undated) DEVICE — AMD ANTIMICROBIAL GAUZE SPONGES,12 PLY USP TYPE VII, 0.2% POLYHEXAMETHYLENE BIGUANIDE HCI (PHMB): Brand: CURITY

## (undated) DEVICE — VISUALIZATION SYSTEM: Brand: CLEARIFY

## (undated) DEVICE — NON-ADHERING DRESSING: Brand: ADAPTIC®

## (undated) DEVICE — FOOT & ANKLE SOFT DR WOMACK: Brand: MEDLINE INDUSTRIES, INC.

## (undated) DEVICE — BANDAGE,GAUZE,CONFORMING,4"X75",STRL,LF: Brand: MEDLINE

## (undated) DEVICE — LAP CHOLE: Brand: MEDLINE INDUSTRIES, INC.

## (undated) DEVICE — [HIGH FLOW INSUFFLATOR,  DO NOT USE IF PACKAGE IS DAMAGED,  KEEP DRY,  KEEP AWAY FROM SUNLIGHT,  PROTECT FROM HEAT AND RADIOACTIVE SOURCES.]: Brand: PNEUMOSURE

## (undated) DEVICE — PERMANENT CAUTERY HOOK: Brand: SINGLE-SITE; DAVINCI SI

## (undated) DEVICE — PADDING CAST W2INXL4YD ST COT COHESIVE HND TEARABLE SPEC

## (undated) DEVICE — PREP SKN CHLRAPRP APL 26ML STR --

## (undated) DEVICE — SYR 10ML LUER LOK 1/5ML GRAD --

## (undated) DEVICE — STERILE HOOK LOCK LATEX FREE ELASTIC BANDAGE 6INX5YD: Brand: HOOK LOCK™

## (undated) DEVICE — CARTRIDGE CLP LIG HEMLOK GRN --

## (undated) DEVICE — TRAY BNE MAR CPRP BMA PLT SENS 1 BTTN AUTOMATION USED TO

## (undated) DEVICE — BNDG ELAS COBAN 2INX5YD NS --

## (undated) DEVICE — DRAPE TWL SURG 16X26IN BLU ORB04] ALLCARE INC]

## (undated) DEVICE — 3M™ COBAN™ NL STERILE NON-LATEX SELF-ADHERENT WRAP, 2082S, 2 IN X 5 YD (5 CM X 4,5 M), 36 ROLLS/CASE: Brand: 3M™ COBAN™

## (undated) DEVICE — CONTAINER SPEC FRMLN 120ML --

## (undated) DEVICE — SPLINT CAST W4XL30IN WHT THMB FBRGLS PRECUT INTLOK WRINKLE

## (undated) DEVICE — COVER,TABLE,44X76,STERILE: Brand: MEDLINE

## (undated) DEVICE — SPONGE GZ W4XL4IN COT 12 PLY TYP VII WVN C FLD DSGN

## (undated) DEVICE — CURVED SCISSORS: Brand: SINGLE-SITE; DAVINCI SI

## (undated) DEVICE — BANDAGE,GAUZE,CONFORMING,2"X75",STRL,LF: Brand: MEDLINE INDUSTRIES, INC.

## (undated) DEVICE — SUTURE MCRYL SZ 3-0 L27IN ABSRB UD L19MM PS-2 3/8 CIR PRIM Y427H

## (undated) DEVICE — STANDARD HYPODERMIC NEEDLE,POLYPROPYLENE HUB: Brand: MONOJECT

## (undated) DEVICE — BNDG ELAS ESMARK 4INX12FT LF -- STRL

## (undated) DEVICE — DRAPE C ARM W54XL84IN MINI FOR OEC 6800

## (undated) DEVICE — 2000CC GUARDIAN II: Brand: GUARDIAN

## (undated) DEVICE — PORT GUIDE CANN SNGL SITE DISP -- DA VINCI

## (undated) DEVICE — CONTAINER PREFIL FRMLN 40ML --

## (undated) DEVICE — CARDINAL HEALTH FLEXIBLE LIGHT HANDLE COVER: Brand: CARDINAL HEALTH

## (undated) DEVICE — ZIMMER® STERILE DISPOSABLE TOURNIQUET CUFF WITH PLC, DUAL PORT, SINGLE BLADDER, 18 IN. (46 CM)

## (undated) DEVICE — BASIC SINGLE BASIN-LF: Brand: MEDLINE INDUSTRIES, INC.

## (undated) DEVICE — KENDALL SCD EXPRESS SLEEVES, KNEE LENGTH, MEDIUM: Brand: KENDALL SCD

## (undated) DEVICE — SUTURE PDS II SZ 0 L27IN ABSRB VLT L26MM CT-2 1/2 CIR Z334H

## (undated) DEVICE — WARMER SCP STRL DISP

## (undated) DEVICE — Device

## (undated) DEVICE — SEAL CANN 5MM S/SI DISP -- DA VINCI

## (undated) DEVICE — SUT ETHLN 3-0 18IN PS2 BLK --

## (undated) DEVICE — CANNULA SEAL

## (undated) DEVICE — SUTURE MCRYL SZ 4-0 L27IN ABSRB UD L19MM PS-2 1/2 CIR PRIM Y426H

## (undated) DEVICE — PADDING CAST W4INXL4YD ST COT COHESIVE HND TEARABLE SPEC

## (undated) DEVICE — CANISTER SUC GEL INFOVAC 500ML --

## (undated) DEVICE — DRAPE ROBOTIC CAM HD DISP ENDOWRIST DA VINCI SI

## (undated) DEVICE — KENDALL RADIOLUCENT FOAM MONITORING ELECTRODE RECTANGULAR SHAPE: Brand: KENDALL

## (undated) DEVICE — DRAPE SHT 3 QTR PROXIMA 53X77 --

## (undated) DEVICE — CANNULA NSL ORAL AD FOR CAPNOFLEX CO2 O2 AIRLFE

## (undated) DEVICE — BLOCK BITE AD 60FR W/ VELC STRP ADDRESSES MOST PT AND

## (undated) DEVICE — FOOT DR TOLLISON & WOMACK: Brand: MEDLINE INDUSTRIES, INC.

## (undated) DEVICE — FORCEPS BX L240CM JAW DIA2.8MM L CAP W/ NDL MIC MESH TOOTH

## (undated) DEVICE — GOWN,REINF,POLY,ECL,PP SLV,XL: Brand: MEDLINE

## (undated) DEVICE — DRAPE ROBOTIC CAM ARM DISP ENDOWRIST DA VINCI SI

## (undated) DEVICE — DERMABOND SKIN ADH 0.7ML -- DERMABOND ADVANCED 12/BX

## (undated) DEVICE — CONNECTOR TBNG OD5-7MM O2 END DISP